# Patient Record
Sex: FEMALE | Race: WHITE | Employment: OTHER | URBAN - METROPOLITAN AREA
[De-identification: names, ages, dates, MRNs, and addresses within clinical notes are randomized per-mention and may not be internally consistent; named-entity substitution may affect disease eponyms.]

---

## 2017-05-31 ENCOUNTER — OFFICE VISIT (OUTPATIENT)
Dept: ORTHOPEDIC SURGERY | Age: 62
End: 2017-05-31

## 2017-05-31 VITALS
HEIGHT: 67 IN | SYSTOLIC BLOOD PRESSURE: 135 MMHG | DIASTOLIC BLOOD PRESSURE: 81 MMHG | WEIGHT: 190.04 LBS | BODY MASS INDEX: 29.83 KG/M2 | HEART RATE: 65 BPM

## 2017-05-31 DIAGNOSIS — M54.31 SCIATICA OF RIGHT SIDE: ICD-10-CM

## 2017-05-31 DIAGNOSIS — M25.551 HIP PAIN, RIGHT: Primary | ICD-10-CM

## 2017-05-31 PROCEDURE — 72100 X-RAY EXAM L-S SPINE 2/3 VWS: CPT | Performed by: ORTHOPAEDIC SURGERY

## 2017-05-31 PROCEDURE — 99214 OFFICE O/P EST MOD 30 MIN: CPT | Performed by: ORTHOPAEDIC SURGERY

## 2017-05-31 RX ORDER — METHYLPREDNISOLONE 4 MG/1
TABLET ORAL
Qty: 1 KIT | Refills: 0 | Status: SHIPPED | OUTPATIENT
Start: 2017-05-31 | End: 2017-05-31 | Stop reason: SDUPTHER

## 2017-05-31 RX ORDER — METHYLPREDNISOLONE 4 MG/1
TABLET ORAL
Qty: 1 KIT | Refills: 0 | Status: SHIPPED | OUTPATIENT
Start: 2017-05-31 | End: 2017-06-06

## 2017-05-31 RX ORDER — IBUPROFEN 200 MG
200 TABLET ORAL EVERY 6 HOURS PRN
COMMUNITY
End: 2019-02-05

## 2017-05-31 RX ORDER — CETIRIZINE HYDROCHLORIDE 10 MG/1
10 TABLET ORAL DAILY
COMMUNITY

## 2017-07-10 DIAGNOSIS — M54.16 LUMBAR RADICULITIS: Primary | ICD-10-CM

## 2017-07-11 ENCOUNTER — TELEPHONE (OUTPATIENT)
Dept: ORTHOPEDIC SURGERY | Age: 62
End: 2017-07-11

## 2017-07-25 ENCOUNTER — OFFICE VISIT (OUTPATIENT)
Dept: ORTHOPEDIC SURGERY | Age: 62
End: 2017-07-25

## 2017-07-25 VITALS
BODY MASS INDEX: 29.83 KG/M2 | HEART RATE: 64 BPM | DIASTOLIC BLOOD PRESSURE: 87 MMHG | HEIGHT: 67 IN | SYSTOLIC BLOOD PRESSURE: 139 MMHG | WEIGHT: 190.04 LBS

## 2017-07-25 DIAGNOSIS — M54.16 LUMBAR RADICULITIS: ICD-10-CM

## 2017-07-25 DIAGNOSIS — M51.36 DDD (DEGENERATIVE DISC DISEASE), LUMBAR: Primary | ICD-10-CM

## 2017-07-25 PROCEDURE — 99214 OFFICE O/P EST MOD 30 MIN: CPT | Performed by: PHYSICIAN ASSISTANT

## 2017-07-28 ENCOUNTER — TELEPHONE (OUTPATIENT)
Dept: ORTHOPEDIC SURGERY | Age: 62
End: 2017-07-28

## 2017-08-01 ENCOUNTER — HOSPITAL ENCOUNTER (OUTPATIENT)
Dept: PAIN MANAGEMENT | Age: 62
Discharge: HOME | End: 2017-08-01
Attending: PHYSICAL MEDICINE & REHABILITATION | Admitting: PHYSICAL MEDICINE & REHABILITATION

## 2017-08-01 VITALS
HEIGHT: 66 IN | DIASTOLIC BLOOD PRESSURE: 78 MMHG | TEMPERATURE: 98.5 F | WEIGHT: 190 LBS | BODY MASS INDEX: 30.53 KG/M2 | RESPIRATION RATE: 16 BRPM | HEART RATE: 68 BPM | SYSTOLIC BLOOD PRESSURE: 125 MMHG | OXYGEN SATURATION: 100 %

## 2017-08-01 RX ORDER — LIDOCAINE HYDROCHLORIDE 10 MG/ML
INJECTION, SOLUTION EPIDURAL; INFILTRATION; INTRACAUDAL; PERINEURAL
Status: DISCONTINUED
Start: 2017-08-01 | End: 2017-08-02 | Stop reason: HOSPADM

## 2017-08-01 RX ORDER — SODIUM CHLORIDE, SODIUM LACTATE, POTASSIUM CHLORIDE, CALCIUM CHLORIDE 600; 310; 30; 20 MG/100ML; MG/100ML; MG/100ML; MG/100ML
INJECTION, SOLUTION INTRAVENOUS CONTINUOUS
Status: DISCONTINUED | OUTPATIENT
Start: 2017-08-01 | End: 2017-08-02 | Stop reason: HOSPADM

## 2017-08-01 RX ORDER — SODIUM CHLORIDE, SODIUM LACTATE, POTASSIUM CHLORIDE, CALCIUM CHLORIDE 600; 310; 30; 20 MG/100ML; MG/100ML; MG/100ML; MG/100ML
INJECTION, SOLUTION INTRAVENOUS
Status: DISCONTINUED
Start: 2017-08-01 | End: 2017-08-02 | Stop reason: HOSPADM

## 2017-08-01 RX ORDER — LIDOCAINE HYDROCHLORIDE 10 MG/ML
0.1 INJECTION, SOLUTION INFILTRATION; PERINEURAL ONCE
Status: COMPLETED | OUTPATIENT
Start: 2017-08-01 | End: 2017-08-01

## 2017-08-01 RX ADMIN — LIDOCAINE HYDROCHLORIDE 0.1 ML: 10 INJECTION, SOLUTION INFILTRATION; PERINEURAL at 13:14

## 2017-08-01 RX ADMIN — SODIUM CHLORIDE, SODIUM LACTATE, POTASSIUM CHLORIDE, CALCIUM CHLORIDE: 600; 310; 30; 20 INJECTION, SOLUTION INTRAVENOUS at 13:14

## 2017-08-01 ASSESSMENT — PAIN DESCRIPTION - DESCRIPTORS: DESCRIPTORS: ACHING

## 2017-08-01 ASSESSMENT — PAIN - FUNCTIONAL ASSESSMENT
PAIN_FUNCTIONAL_ASSESSMENT: 0-10
PAIN_FUNCTIONAL_ASSESSMENT: 0-10

## 2017-08-16 ENCOUNTER — OFFICE VISIT (OUTPATIENT)
Dept: ORTHOPEDIC SURGERY | Age: 62
End: 2017-08-16

## 2017-08-16 VITALS
BODY MASS INDEX: 30.54 KG/M2 | HEART RATE: 65 BPM | HEIGHT: 66 IN | DIASTOLIC BLOOD PRESSURE: 76 MMHG | WEIGHT: 190.04 LBS | SYSTOLIC BLOOD PRESSURE: 129 MMHG

## 2017-08-16 DIAGNOSIS — M51.36 DDD (DEGENERATIVE DISC DISEASE), LUMBAR: Primary | ICD-10-CM

## 2017-08-16 DIAGNOSIS — M54.16 LUMBAR RADICULITIS: ICD-10-CM

## 2017-08-16 PROCEDURE — 99213 OFFICE O/P EST LOW 20 MIN: CPT | Performed by: PHYSICIAN ASSISTANT

## 2017-08-24 ENCOUNTER — HOSPITAL ENCOUNTER (OUTPATIENT)
Dept: PHYSICAL THERAPY | Age: 62
Discharge: HOME | End: 2017-08-31
Admitting: PHYSICIAN ASSISTANT

## 2017-08-31 ENCOUNTER — HOSPITAL ENCOUNTER (OUTPATIENT)
Dept: PHYSICAL THERAPY | Age: 62
Discharge: HOME | End: 2017-08-31
Admitting: PHYSICIAN ASSISTANT

## 2017-09-05 ENCOUNTER — HOSPITAL ENCOUNTER (OUTPATIENT)
Dept: PHYSICAL THERAPY | Age: 62
Discharge: HOME | End: 2017-09-05
Admitting: PHYSICIAN ASSISTANT

## 2017-09-07 ENCOUNTER — HOSPITAL ENCOUNTER (OUTPATIENT)
Dept: PHYSICAL THERAPY | Age: 62
Discharge: HOME | End: 2017-09-07
Admitting: PHYSICIAN ASSISTANT

## 2017-09-12 ENCOUNTER — HOSPITAL ENCOUNTER (OUTPATIENT)
Dept: PHYSICAL THERAPY | Age: 62
Discharge: HOME | End: 2017-09-12
Admitting: PHYSICIAN ASSISTANT

## 2017-09-14 ENCOUNTER — HOSPITAL ENCOUNTER (OUTPATIENT)
Dept: PHYSICAL THERAPY | Age: 62
Discharge: HOME | End: 2017-09-14
Admitting: PHYSICIAN ASSISTANT

## 2017-09-19 ENCOUNTER — HOSPITAL ENCOUNTER (OUTPATIENT)
Dept: PHYSICAL THERAPY | Age: 62
Discharge: HOME | End: 2017-09-19
Admitting: PHYSICIAN ASSISTANT

## 2017-09-21 ENCOUNTER — HOSPITAL ENCOUNTER (OUTPATIENT)
Dept: PHYSICAL THERAPY | Age: 62
Discharge: HOME | End: 2017-09-21
Admitting: PHYSICIAN ASSISTANT

## 2017-09-22 ENCOUNTER — TELEPHONE (OUTPATIENT)
Dept: ORTHOPEDIC SURGERY | Age: 62
End: 2017-09-22

## 2017-09-26 ENCOUNTER — HOSPITAL ENCOUNTER (OUTPATIENT)
Dept: PHYSICAL THERAPY | Age: 62
Discharge: HOME | End: 2017-09-26
Admitting: PHYSICIAN ASSISTANT

## 2017-09-28 ENCOUNTER — HOSPITAL ENCOUNTER (OUTPATIENT)
Dept: PHYSICAL THERAPY | Age: 62
Discharge: HOME | End: 2017-09-28
Admitting: PHYSICIAN ASSISTANT

## 2017-09-29 ENCOUNTER — HOSPITAL ENCOUNTER (OUTPATIENT)
Dept: PAIN MANAGEMENT | Age: 62
Discharge: HOME | End: 2017-09-29
Attending: PHYSICAL MEDICINE & REHABILITATION | Admitting: PHYSICAL MEDICINE & REHABILITATION

## 2017-09-29 VITALS
DIASTOLIC BLOOD PRESSURE: 78 MMHG | HEART RATE: 70 BPM | RESPIRATION RATE: 16 BRPM | SYSTOLIC BLOOD PRESSURE: 136 MMHG | BODY MASS INDEX: 29.82 KG/M2 | HEIGHT: 67 IN | WEIGHT: 190 LBS | TEMPERATURE: 96.9 F | OXYGEN SATURATION: 100 %

## 2017-09-29 ASSESSMENT — PAIN DESCRIPTION - DESCRIPTORS: DESCRIPTORS: ACHING

## 2017-09-29 ASSESSMENT — PAIN - FUNCTIONAL ASSESSMENT
PAIN_FUNCTIONAL_ASSESSMENT: 0-10
PAIN_FUNCTIONAL_ASSESSMENT: 0-10

## 2017-10-03 ENCOUNTER — HOSPITAL ENCOUNTER (OUTPATIENT)
Dept: PHYSICAL THERAPY | Age: 62
Discharge: HOME | End: 2017-10-03
Admitting: PHYSICIAN ASSISTANT

## 2017-10-03 NOTE — FLOWSHEET NOTE
Therapeutic Ex Sets/reps Notes   R SL with Rot 5'    R SL with Rot and OP 5'    Prone and prone with hips L Decreased ankle pain to 1/10    Press ups with hips L 3x10    FR R 5' Hips to 90-abolishes LE pain cont.'d numbness                                                                                                    Manual Intervention     OP with SL rot 5'    Shift hips L with press ups 3x10    Lumbar/sacral PA's 6'    Piriformis release 5'              NMR re-education     Reviewed sit to supine to sit to avoid flexion during transfer                                                 Therapeutic Exercise and NMR EXR  [x] (26203) Provided verbal/tactile cueing for activities related to strengthening, flexibility, endurance, ROM for improvements in LE, proximal hip, and core control with self care, mobility, lifting, ambulation.  [] (13513) Provided verbal/tactile cueing for activities related to improving balance, coordination, kinesthetic sense, posture, motor skill, proprioception  to assist with LE, proximal hip, and core control in self care, mobility, lifting, ambulation and eccentric single leg control.      NMR and Therapeutic Activities:    [x] (57539 or 59352) Provided verbal/tactile cueing for activities related to improving balance, coordination, kinesthetic sense, posture, motor skill, proprioception and motor activation to allow for proper function of core, proximal hip and LE with self care and ADLs  [] (60245) Gait Re-education- Provided training and instruction to the patient for proper LE, core and proximal hip recruitment and positioning and eccentric body weight control with ambulation re-education including up and down stairs     Home Exercise Program:    [x] (13492) Reviewed/Progressed HEP activities related to strengthening, flexibility, endurance, ROM of core, proximal hip and LE for functional self-care, mobility, lifting and ambulation/stair navigation   [] (27412)Reviewed/Progressed

## 2017-10-05 ENCOUNTER — HOSPITAL ENCOUNTER (OUTPATIENT)
Dept: PHYSICAL THERAPY | Age: 62
Discharge: HOME | End: 2017-10-05
Admitting: PHYSICIAN ASSISTANT

## 2017-10-05 NOTE — FLOWSHEET NOTE
[facility], [address]  Phone: [phone]  Fax [phone]    Physical Therapy Daily Treatment Note  Date:  10/5/2017    Patient Name:  Saloni Rodrigues    :  1955  MRN: [id number]  Restrictions/Precautions:    Physician Information:  Referring Practitioner: Dr. Duane Olds  Medical/Treatment Diagnosis Information:  · Diagnosis: lumbar DDD, lumbar radiculitis  · Treatment Diagnosis: Lumbar radiculopathy  ICD-10-CM Diagnosis Code M54.16, Low back pain  ICD-10-CM Diagnosis Code M54.5  [x] Conservative / [] Surgical - DOS:  Therapy Diagnosis/Practice Pattern:  Practice Pattern F: Spinal Disorders  Insurance/Certification information:  PT Insurance Information: 17 ANTHEM - $0CP - $400DED - 20PT/OT - 85/15% - AFTER 20V, [address] [facility] [phone] - MUST CTC PATIENT FOR ACCUMULATIONS  Plan of care signed: [] YES  [] NO  Number of Comorbidities:  []0     [x]1-2    []3+  Date of Patient follow up with Physician:     G-Code (if applicable):      Date G-Code Applied:  17       Progress Note: [x]  Yes  []  No  Next due by: Visit #10        Latex Allergy:  [x]NO      []YES  Preferred Language for Healthcare:   [x]English       []other:    Visit # Insurance Allowable Reporting Period    Begin Date: 10/5/2017               End Date:      RECERT DUE BY: 39/10/24    Pain level:  6/10     SUBJECTIVE:  Pt. Reports that when the legs start hurting, it seems like sitting helps the most.  Has the most trouble with trying to get comfortable at night.     OBJECTIVE:   Observation:stiffness in lumbar spine with PA's  Palpation:R lumbar and thoracic paraspinals, R piriformis>L     Test used Initial score Current Score   Pain Summary VAS 6/10 5-6/10   Functional questionnaire Modified oswestry 50% 66%   ROM       Lumbar Ext <10% 50%   Strength LE R/L 4/5               RESTRICTIONS/PRECAUTIONS: LE for functional self-care, mobility, lifting and ambulation/stair navigation   [] (63753)Reviewed/Progressed HEP activities related to improving balance, coordination, kinesthetic sense, posture, motor skill, proprioception of core, proximal hip and LE for self care, mobility, lifting, and ambulation/stair navigation      Manual Treatments:  PROM / STM / Oscillations-Mobs:  G-I, II, III, IV (PA's, Inf., Post.)  [x] (79163) Provided manual therapy to mobilize LE, proximal hip and/or LS spine soft tissue/joints for the purpose of modulating pain, promoting relaxation,  increasing ROM, reducing/eliminating soft tissue swelling/inflammation/restriction, improving soft tissue extensibility and allowing for proper ROM for normal function with self care, mobility, lifting and ambulation. Modalities:  PM/HP x15'HLCharges:  Timed Code Treatment Minutes: 38   Total Treatment Minutes: 53     [] EVAL (LOW) 50939 (typically 20 minutes face-to-face)  [] EVAL (MOD) 51415 (typically 30 minutes face-to-face)  [] EVAL (HIGH) 17739 (typically 45 minutes face-to-face)  [] RE-EVAL     [x] Tajik(38788) x  1   [] IONTO  [x] NMR (45428) x  1   [] VASO  [x] Manual (15041) x  1    [] Other:  [] TA x       [] Mech Traction (56667)  [] ES(attended) (73577)      [x] ES (un) (15675):     GOALS:   Patient stated goal: I would like to be able to walk again    Therapist goals for Patient:   Short Term Goals: To be achieved in: 2 weeks  1. Independent in HEP and progression per patient tolerance, in order to prevent re-injury. 2. Patient will have a decrease in pain to facilitate improvement in movement, function, and ADLs as indicated by Functional Deficits. Long Term Goals: To be achieved in: 8 weeks  1. Disability index score of 25% or less for the [place] to assist with reaching prior level of function.    2. Patient will demonstrate increased lumbar AROM to WNL, good+ LS mobility, good hip ROM to allow for proper joint functioning as

## 2017-10-09 ENCOUNTER — HOSPITAL ENCOUNTER (OUTPATIENT)
Dept: PHYSICAL THERAPY | Age: 62
Discharge: HOME | End: 2017-10-09
Admitting: PHYSICIAN ASSISTANT

## 2017-10-09 NOTE — FLOWSHEET NOTE
5' Lessens pain at R ankle   R SL with Rot and OP 5' Lessens pain at R ankle             FR R 5' Lessens pain at R ankle        SKTC R 4x10 More central during and after   DKTC 4x10 More central and less pain during and after   PPT :5x20    Supine HS stretch :30x3    R piriformis stretch :30x3                                                                       Manual Intervention     OP with SL rot 5'    Shift hips L with press ups 3x10       Piriformis release 5'              NMR re-education     Reviewed sit to supine to sit to avoid flexion during transfer          Reviewed extensively how to track distal symptoms and centralization 8'                                      Therapeutic Exercise and NMR EXR  [x] (53455) Provided verbal/tactile cueing for activities related to strengthening, flexibility, endurance, ROM for improvements in LE, proximal hip, and core control with self care, mobility, lifting, ambulation.  [] (25489) Provided verbal/tactile cueing for activities related to improving balance, coordination, kinesthetic sense, posture, motor skill, proprioception  to assist with LE, proximal hip, and core control in self care, mobility, lifting, ambulation and eccentric single leg control.      NMR and Therapeutic Activities:    [x] (54860 or 98946) Provided verbal/tactile cueing for activities related to improving balance, coordination, kinesthetic sense, posture, motor skill, proprioception and motor activation to allow for proper function of core, proximal hip and LE with self care and ADLs  [] (18664) Gait Re-education- Provided training and instruction to the patient for proper LE, core and proximal hip recruitment and positioning and eccentric body weight control with ambulation re-education including up and down stairs     Home Exercise Program:    [x] (08828) Reviewed/Progressed HEP activities related to strengthening, flexibility, endurance, ROM of core, proximal hip and LE for functional

## 2017-10-12 ENCOUNTER — HOSPITAL ENCOUNTER (OUTPATIENT)
Dept: PHYSICAL THERAPY | Age: 62
Discharge: HOME | End: 2017-10-12
Admitting: PHYSICIAN ASSISTANT

## 2017-10-12 NOTE — FLOWSHEET NOTE
[facility], [address]  Phone: [phone]  Fax [phone]    Physical Therapy Daily Treatment Note  Date:  10/12/2017    Patient Name:  Zack Lazaro    :  1955  MRN: [id number]  Restrictions/Precautions:    Physician Information:  Referring Practitioner: Dr. Lyle Manuel  Medical/Treatment Diagnosis Information:  · Diagnosis: lumbar DDD, lumbar radiculitis  · Treatment Diagnosis: Lumbar radiculopathy  ICD-10-CM Diagnosis Code M54.16, Low back pain  ICD-10-CM Diagnosis Code M54.5  [x] Conservative / [] Surgical - DOS:  Therapy Diagnosis/Practice Pattern:  Practice Pattern F: Spinal Disorders  Insurance/Certification information:  PT Insurance Information: 17 ANTHEM - $0CP - $400DED - 20PT/OT - 85/15% - AFTER 20V, [address] [facility] [phone] - MUST CTC PATIENT FOR ACCUMULATIONS  Plan of care signed: [] YES  [] NO  Number of Comorbidities:  []0     [x]1-2    []3+  Date of Patient follow up with Physician:     G-Code (if applicable):      Date G-Code Applied:  17       Progress Note: [x]  Yes  []  No  Next due by: Visit #10        Latex Allergy:  [x]NO      []YES  Preferred Language for Healthcare:   [x]English       []other:    Visit # Insurance Allowable Reporting Period   15 20 Begin Date: 10/12/2017               End Date:      RECERT DUE BY:     Pain level:  2/10  back     SUBJECTIVE:  Pt. Reports that she was in the recline and started to choke in her sleep, she jumped out of the chair and hit the R K' on the floor. She is very upset and thinks this is a real setback. She is walking w/o a limp, no bruising little/no swelling in the R K'. 3/10 pain, just achey. No c/o radiating pain in either leg. She seemed consumed by \" all her problems \", \" falling apart \". Needed reassurance today. OBJECTIVE:   Darrius Langston in lumbar spine with Edna ZHOU' Gait Re-education- Provided training and instruction to the patient for proper LE, core and proximal hip recruitment and positioning and eccentric body weight control with ambulation re-education including up and down stairs     Home Exercise Program:    [x] (88111) Reviewed/Progressed HEP activities related to strengthening, flexibility, endurance, ROM of core, proximal hip and LE for functional self-care, mobility, lifting and ambulation/stair navigation   [] (22732)Reviewed/Progressed HEP activities related to improving balance, coordination, kinesthetic sense, posture, motor skill, proprioception of core, proximal hip and LE for self care, mobility, lifting, and ambulation/stair navigation      Manual Treatments:  PROM / STM / Oscillations-Mobs:  G-I, II, III, IV (PA's, Inf., Post.)  [x] (55657) Provided manual therapy to mobilize LE, proximal hip and/or LS spine soft tissue/joints for the purpose of modulating pain, promoting relaxation,  increasing ROM, reducing/eliminating soft tissue swelling/inflammation/restriction, improving soft tissue extensibility and allowing for proper ROM for normal function with self care, mobility, lifting and ambulation. Modalities:  PM/HP x15'HLCharges:  Timed Code Treatment Minutes: 45   Total Treatment Minutes: 60     [] EVAL (LOW) 06242 (typically 20 minutes face-to-face)  [] EVAL (MOD) 65339 (typically 30 minutes face-to-face)  [] EVAL (HIGH) 64884 (typically 45 minutes face-to-face)  [] RE-EVAL     [x] OC(56919) x  1   [] IONTO  [x] NMR (86451) x  1   [] VASO  [x] Manual (46384) x  1    [] Other:  [] TA x       [] Mech Traction (36800)  [] ES(attended) (86470)      [x] ES (un) (38689):     GOALS:   Patient stated goal: I would like to be able to walk again    Therapist goals for Patient:   Short Term Goals: To be achieved in: 2 weeks  1. Independent in HEP and progression per patient tolerance, in order to prevent re-injury.    2. Patient will have a decrease in pain to facilitate improvement in movement, function, and ADLs as indicated by Functional Deficits. Long Term Goals: To be achieved in: 8 weeks  1. Disability index score of 25% or less for the [place] to assist with reaching prior level of function. 2. Patient will demonstrate increased lumbar AROM to WNL, good+ LS mobility, good hip ROM to allow for proper joint functioning as indicated by patients Functional Deficits. 3. Patient will demonstrate an increase in Strength to good+ proximal hip and core activation to allow for proper functional mobility as indicated by patients Functional Deficits. 4. Patient will return to all adl functional activities without increased symptoms or restriction. 5. Pt. To be able to walk without pain(patient specific functional goal)         New or Updated Goals (if applicable):  [x] No change to goals established upon initial eval/last progress note:  New Goals:    Progression Towards Functional goals:   [] Patient is progressing as expected towards functional goals listed. [x] Progression is slowed due to complexities listed. [] Progression has been slowed due to co-morbidities. [] Plan just implemented, too soon to assess goals progression  [] Other:     ASSESSMENT:    [x] Improvement noted relative to goals:pain is less intense and less frequent into the R LE  [] No Improvement noted related to goals:  Summary/Patient's response to treatment: trial of repeated supine flexion this date seems to be more effective at centralizing and decreasing pain into R LE and LB. Reviewed tracking and centralization with pt. She will try changing to repeated flexion biased ex. Today and monitor symptom response.     Treatment/Activity Tolerance:  [x] Patient tolerated treatment well [] Patient limited by fatique  [] Patient limited by pain  [] Patient limited by other medical complications  [] Other:     Prognosis: [] Good [x] Fair  [] Poor    Patient Requires Follow-up: [x] Yes  [] No    PLAN:   [x] Continue per plan of care [] Alter current plan (see comments)  [] Plan of care initiated [] Hold pending MD visit [] Discharge    Electronically signed by: George Smith PT

## 2017-10-13 ENCOUNTER — OFFICE VISIT (OUTPATIENT)
Dept: ORTHOPEDIC SURGERY | Age: 62
End: 2017-10-13

## 2017-10-13 VITALS
BODY MASS INDEX: 29.83 KG/M2 | SYSTOLIC BLOOD PRESSURE: 128 MMHG | WEIGHT: 190.04 LBS | DIASTOLIC BLOOD PRESSURE: 89 MMHG | HEART RATE: 69 BPM | HEIGHT: 67 IN

## 2017-10-13 DIAGNOSIS — M51.36 DDD (DEGENERATIVE DISC DISEASE), LUMBAR: Primary | ICD-10-CM

## 2017-10-13 DIAGNOSIS — M54.16 LUMBAR RADICULITIS: ICD-10-CM

## 2017-10-13 PROCEDURE — 99213 OFFICE O/P EST LOW 20 MIN: CPT | Performed by: PHYSICAL MEDICINE & REHABILITATION

## 2017-10-13 NOTE — PROGRESS NOTES
Coronal balance is neutral.      · Palpation:   No evidence of tenderness at the midline. No tenderness bilaterally at the paraspinal or trochanters. There is no step-off or paraspinal spasm. · Range of Motion:    Minimal loss flexion extension  · Strength:   Strength testing is 5/5 in all muscle groups tested. · Special Tests:   Straight leg raise and crossed SLR negative. Leg length and pelvis level.  0 out of 5 Bharath's signs. · Skin: There are no rashes, ulcerations or lesions. · Reflexes: Reflexes are symmetrically 2+ at the patellar and ankle tendons. Clonus absent bilaterally at the feet. · Gait & station: Normal gait   · Additional Examinations:   · RIGHT LOWER EXTREMITY: Inspection/examination of the right lower extremity does not show any tenderness, deformity or injury. Range of motion is full. There is no gross instability. There are no rashes, ulcerations or lesions. Strength and tone are normal.  · LEFT LOWER EXTREMITY:  Inspection/examination of the left lower extremity does not show any tenderness, deformity or injury. Range of motion is full. There is no gross instability. There are no rashes, ulcerations or lesions. Strength and tone are normal.    Diagnostic Testing:     Lumbar MRI scan and report reviewed 7/17/2017 showing L5-S1 DDD with Modic changes and disc bulging w/bilateral foraminal stenosis, facet arthropathy/synovitis.  No severe central or foraminal narrowing    Impression:    7 mo lbp, R>L lumbar radiculitis improved  L5-S1 DDD, modic changes w/B FS  3) Hip eval, Dr. Norman Boots:    She has good bad days and 50-50 back pain and right lateral calf pain. Both suggest monitoring and continuing therapy and activities to her tolerance.   She'll call in 3-4 weeks the symptoms persist or worsen consider a 3rd epidural right L5-S1 interlaminar      F Frankie Freeman

## 2017-10-17 ENCOUNTER — HOSPITAL ENCOUNTER (OUTPATIENT)
Dept: PHYSICAL THERAPY | Age: 62
Discharge: HOME | End: 2017-10-17
Admitting: PHYSICIAN ASSISTANT

## 2017-10-17 NOTE — FLOWSHEET NOTE
Functional Deficits. 3. Patient will demonstrate an increase in Strength to good+ proximal hip and core activation to allow for proper functional mobility as indicated by patients Functional Deficits. 4. Patient will return to all adl functional activities without increased symptoms or restriction. 5. Pt. To be able to walk without pain(patient specific functional goal)         New or Updated Goals (if applicable):  [x] No change to goals established upon initial eval/last progress note:  New Goals:    Progression Towards Functional goals:   [] Patient is progressing as expected towards functional goals listed. [x] Progression is slowed due to complexities listed. [] Progression has been slowed due to co-morbidities. [] Plan just implemented, too soon to assess goals progression  [] Other:     ASSESSMENT:    [x] Improvement noted relative to goals:pain is less intense and less frequent into the R LE, seems to be more central  [] No Improvement noted related to goals:  Summary/Patient's response to treatment: able to abolish pain with flexion biased approach, instructed pt. To perform exercises every 2 hours.   Treatment/Activity Tolerance:  [x] Patient tolerated treatment well [] Patient limited by fatique  [] Patient limited by pain  [] Patient limited by other medical complications  [] Other:     Prognosis: [] Good [x] Fair  [] Poor    Patient Requires Follow-up: [x] Yes  [] No    PLAN:   [x] Continue per plan of care [] Alter current plan (see comments)  [] Plan of care initiated [] Hold pending MD visit [] Discharge    Electronically signed by: John Rojas, PT [id number]

## 2017-10-20 ENCOUNTER — HOSPITAL ENCOUNTER (OUTPATIENT)
Dept: PHYSICAL THERAPY | Age: 62
Discharge: HOME | End: 2017-10-20
Admitting: PHYSICIAN ASSISTANT

## 2017-10-24 ENCOUNTER — HOSPITAL ENCOUNTER (OUTPATIENT)
Dept: PHYSICAL THERAPY | Age: 62
Discharge: HOME | End: 2017-10-24
Admitting: PHYSICIAN ASSISTANT

## 2017-10-24 NOTE — FLOWSHEET NOTE
[facility], [address]  Phone: [phone]  Fax [phone]    Physical Therapy Daily Treatment Note  Date:  10/24/2017    Patient Name:  Carolyn Farrell    :  1955  MRN: [id number]  Restrictions/Precautions:    Physician Information:  Referring Practitioner: Dr. Kassie Nath  Medical/Treatment Diagnosis Information:  · Diagnosis: lumbar DDD, lumbar radiculitis  · Treatment Diagnosis: Lumbar radiculopathy  ICD-10-CM Diagnosis Code M54.16, Low back pain  ICD-10-CM Diagnosis Code M54.5  [x] Conservative / [] Surgical - DOS:  Therapy Diagnosis/Practice Pattern:  Practice Pattern F: Spinal Disorders  Insurance/Certification information:  PT Insurance Information: 17 ANTHEM - $0CP - $400DED - 20PT/OT - 85/15% - AFTER 20V, [address] [facility] [phone] - MUST CTC PATIENT FOR ACCUMULATIONS  Plan of care signed: [] YES  [] NO  Number of Comorbidities:  []0     [x]1-2    []3+  Date of Patient follow up with Physician:     G-Code (if applicable):      Date G-Code Applied:  17       Progress Note: [x]  Yes  []  No  Next due by: Visit #10        Latex Allergy:  [x]NO      []YES  Preferred Language for Healthcare:   [x]English       []other:    Visit # Insurance Allowable Reporting Period    Begin Date: 10/24/2017               End Date:      RECERT DUE BY: 10/36/12    Pain level:  2/10  back     SUBJECTIVE:  The last 2 days have not been good, pain in leg is so bad and the buttock. OBJECTIVE:   Steele Gretchen in lumbar spine with PA's. R K' soreness.   Palpation:R lumbar and thoracic paraspinals, R piriformis>L     Test used Initial score Current Score   Pain Summary VAS 6/10 3/10   Functional questionnaire Modified oswestry 50% 66%   ROM       Lumbar Ext <10% 50%   Strength LE R/L 4/5               RESTRICTIONS/PRECAUTIONS:     Exercises/Interventions:     Therapeutic Ex Sets/reps Notes Supine unload in HL 5'    PPT 3x10 H5 After DKTC decreases pain   Trial of R SL with rot and prone/HANK No significant change              SKTC R     DKTC 8x10 To centralize to B LB   Second round of DKTC after manuals 2x10    Supine HS stretch :30x3    R piriformis stretch :30x3    QL stretch:60x1 B manual              seated PPT x3' Cues needed   Standing PPT x2' to learn    Bike                                                 Manual Intervention               Lumbar flexion mobs in SL L4-S1 6'    STW RQL/RSIJ 8'              NMR re-education              Reviewed extensively how to track distal symptoms and centralization                                       Therapeutic Exercise and NMR EXR  [x] (67364) Provided verbal/tactile cueing for activities related to strengthening, flexibility, endurance, ROM for improvements in LE, proximal hip, and core control with self care, mobility, lifting, ambulation.  [] (29584) Provided verbal/tactile cueing for activities related to improving balance, coordination, kinesthetic sense, posture, motor skill, proprioception  to assist with LE, proximal hip, and core control in self care, mobility, lifting, ambulation and eccentric single leg control.      NMR and Therapeutic Activities:    [x] (94567 or 13532) Provided verbal/tactile cueing for activities related to improving balance, coordination, kinesthetic sense, posture, motor skill, proprioception and motor activation to allow for proper function of core, proximal hip and LE with self care and ADLs  [] (16435) Gait Re-education- Provided training and instruction to the patient for proper LE, core and proximal hip recruitment and positioning and eccentric body weight control with ambulation re-education including up and down stairs     Home Exercise Program:    [x] (74496) Reviewed/Progressed HEP activities related to strengthening, flexibility, endurance, ROM of core, proximal hip and LE for functional self-care, mobility, lifting and ambulation/stair navigation   [] (39777)Reviewed/Progressed HEP activities related to improving balance, coordination, kinesthetic sense, posture, motor skill, proprioception of core, proximal hip and LE for self care, mobility, lifting, and ambulation/stair navigation      Manual Treatments:  PROM / STM / Oscillations-Mobs:  G-I, II, III, IV (PA's, Inf., Post.)  [x] (72371) Provided manual therapy to mobilize LE, proximal hip and/or LS spine soft tissue/joints for the purpose of modulating pain, promoting relaxation,  increasing ROM, reducing/eliminating soft tissue swelling/inflammation/restriction, improving soft tissue extensibility and allowing for proper ROM for normal function with self care, mobility, lifting and ambulation. Modalities:  PM/HP x15'HL,  pt. Stayed lying for additional 10' afterwardCharges:  Timed Code Treatment Minutes: 40   Total Treatment Minutes: 55     [] EVAL (LOW) 29894 (typically 20 minutes face-to-face)  [] EVAL (MOD) 60301 (typically 30 minutes face-to-face)  [] EVAL (HIGH) 50108 (typically 45 minutes face-to-face)  [] RE-EVAL     [x] SD(42056) x  1   [] IONTO  [x] NMR (21214) x  1   [] VASO  [x] Manual (23275) x  1    [] Other:  [] TA x       [] Mech Traction (72734)  [] ES(attended) (98629)      [x] ES (un) (09013):     GOALS:   Patient stated goal: I would like to be able to walk again    Therapist goals for Patient:   Short Term Goals: To be achieved in: 2 weeks  1. Independent in HEP and progression per patient tolerance, in order to prevent re-injury. 2. Patient will have a decrease in pain to facilitate improvement in movement, function, and ADLs as indicated by Functional Deficits. Long Term Goals: To be achieved in: 8 weeks  1. Disability index score of 25% or less for the [place] to assist with reaching prior level of function.    2. Patient will demonstrate increased lumbar AROM to WNL, good+ LS mobility, good hip ROM to allow for proper joint

## 2017-11-01 ENCOUNTER — HOSPITAL ENCOUNTER (OUTPATIENT)
Dept: PHYSICAL THERAPY | Age: 62
Discharge: HOME | End: 2017-11-30
Attending: PHYSICIAN ASSISTANT | Admitting: PHYSICIAN ASSISTANT

## 2017-11-17 ENCOUNTER — OFFICE VISIT (OUTPATIENT)
Dept: ORTHOPEDIC SURGERY | Age: 62
End: 2017-11-17

## 2017-11-17 VITALS
SYSTOLIC BLOOD PRESSURE: 117 MMHG | DIASTOLIC BLOOD PRESSURE: 75 MMHG | HEART RATE: 77 BPM | WEIGHT: 190.04 LBS | HEIGHT: 67 IN | BODY MASS INDEX: 29.83 KG/M2

## 2017-11-17 DIAGNOSIS — M79.672 BILATERAL FOOT PAIN: Primary | ICD-10-CM

## 2017-11-17 DIAGNOSIS — M79.671 BILATERAL FOOT PAIN: Primary | ICD-10-CM

## 2017-11-17 PROCEDURE — 99214 OFFICE O/P EST MOD 30 MIN: CPT | Performed by: ORTHOPAEDIC SURGERY

## 2017-11-17 NOTE — PROGRESS NOTES
Chief Complaint    Pain (Bilateral foot pain needs orthotic.)      History of Present Illness:  Carolyn Farrell is a 65 y.o. female who is here for evaluation of her bilateral midfoot pain. She states that she has back knee and midfoot pain. It's constant walking too much or standing too much increase her pain getting off of her feet make it feel better. She is tried meloxicam intermittently which does help somewhat. She's used inserts in the past and would like a new prescription for custom inserts. Currently rates her pain at 4 out of 10. He also complains of pain along the posterior lateral aspect of the right ankle runs up the back or leg     Medical History:  Patient's medications, allergies, past medical, surgical, social and family histories were reviewed and updated as appropriate. Review of Systems:  Pertinent items are noted in HPI  Review of systems reviewed from Patient History Form dated on 11/17/17 and available in the patient's chart under the Media tab. Vital Signs:  /75 (Site: Left Arm, Position: Sitting)   Pulse 77   Ht 5' 6.93\" (1.7 m)   Wt 190 lb 0.6 oz (86.2 kg)   BMI 29.83 kg/m²     General Exam:   Constitutional: Patient is adequately groomed with no evidence of malnutrition  DTRs: Deep tendon reflexes are intact  Mental Status: The patient is oriented to time, place and person. The patient's mood and affect are appropriate. Lymphatic: The lymphatic examination bilaterally reveals all areas to be without enlargement or induration. Foot Examination:    Inspection:  Minimal swelling bilateral midfoot    Palpation:  Tenderness to the midfoot with mobilization. No pain with palpation along the peroneal tendons    Range of Motion:  Tight gastrocs and hamstrings    Strength:  Generally 4/5 no focal weakness    Special Tests:  Anterior drawer and talar tilt showed no gross laxity    Skin: There are no rashes, ulcerations or lesions.     Gait: Antalgic    Reflex 2+ and

## 2017-11-21 ENCOUNTER — TELEPHONE (OUTPATIENT)
Dept: INTERNAL MEDICINE CLINIC | Age: 62
End: 2017-11-21

## 2017-11-21 NOTE — TELEPHONE ENCOUNTER
Patient called requesting an order for Bone density and 3-D mammogram  she will schedule at [facility]. Saroj Swan states she will schedule follow up along the scans. So results will be in . Her number is [phone].

## 2017-11-28 ENCOUNTER — HOSPITAL ENCOUNTER (OUTPATIENT)
Dept: MAMMOGRAPHY | Age: 62
Discharge: HOME | End: 2017-11-28
Attending: INTERNAL MEDICINE | Admitting: INTERNAL MEDICINE

## 2017-11-28 ENCOUNTER — TELEPHONE (OUTPATIENT)
Dept: INTERNAL MEDICINE CLINIC | Age: 62
End: 2017-11-28

## 2017-11-28 DIAGNOSIS — M85.80 OSTEOPENIA, UNSPECIFIED LOCATION: ICD-10-CM

## 2017-11-28 DIAGNOSIS — Z13.820 SCREENING FOR OSTEOPOROSIS: ICD-10-CM

## 2017-11-28 DIAGNOSIS — Z78.0 POSTMENOPAUSE: Primary | ICD-10-CM

## 2017-11-28 DIAGNOSIS — Z12.31 ENCOUNTER FOR SCREENING MAMMOGRAM FOR BREAST CANCER: ICD-10-CM

## 2017-11-28 NOTE — TELEPHONE ENCOUNTER
Pt has been informed. Regarding- Inform pt DEXA ordered and Dr. Nicolas Mario already ordered mammogram per epic.

## 2018-01-04 ENCOUNTER — OFFICE VISIT (OUTPATIENT)
Dept: INTERNAL MEDICINE CLINIC | Age: 63
End: 2018-01-04

## 2018-01-04 VITALS
SYSTOLIC BLOOD PRESSURE: 128 MMHG | HEIGHT: 67 IN | RESPIRATION RATE: 16 BRPM | DIASTOLIC BLOOD PRESSURE: 94 MMHG | BODY MASS INDEX: 30.13 KG/M2 | WEIGHT: 192 LBS | HEART RATE: 78 BPM

## 2018-01-04 DIAGNOSIS — G89.29 CHRONIC BILATERAL LOW BACK PAIN WITH RIGHT-SIDED SCIATICA: ICD-10-CM

## 2018-01-04 DIAGNOSIS — M85.80 OSTEOPENIA, UNSPECIFIED LOCATION: ICD-10-CM

## 2018-01-04 DIAGNOSIS — Z23 NEED FOR DIPHTHERIA-TETANUS-PERTUSSIS (TDAP) VACCINE, ADULT/ADOLESCENT: ICD-10-CM

## 2018-01-04 DIAGNOSIS — Z00.00 ANNUAL PHYSICAL EXAM: Primary | ICD-10-CM

## 2018-01-04 DIAGNOSIS — M54.41 CHRONIC BILATERAL LOW BACK PAIN WITH RIGHT-SIDED SCIATICA: ICD-10-CM

## 2018-01-04 LAB
A/G RATIO: 1.7 (ref 1.1–2.2)
ALBUMIN SERPL-MCNC: 4.7 G/DL (ref 3.4–5)
ALP BLD-CCNC: 91 U/L (ref 40–129)
ALT SERPL-CCNC: 20 U/L (ref 10–40)
ANION GAP SERPL CALCULATED.3IONS-SCNC: 17 MMOL/L (ref 3–16)
AST SERPL-CCNC: 20 U/L (ref 15–37)
BASOPHILS ABSOLUTE: 0 K/UL (ref 0–0.2)
BASOPHILS RELATIVE PERCENT: 0.9 %
BILIRUB SERPL-MCNC: 0.5 MG/DL (ref 0–1)
BUN BLDV-MCNC: 12 MG/DL (ref 7–20)
CALCIUM SERPL-MCNC: 9.6 MG/DL (ref 8.3–10.6)
CHLORIDE BLD-SCNC: 98 MMOL/L (ref 99–110)
CHOLESTEROL, TOTAL: 243 MG/DL (ref 0–199)
CO2: 25 MMOL/L (ref 21–32)
CREAT SERPL-MCNC: 0.8 MG/DL (ref 0.6–1.2)
EOSINOPHILS ABSOLUTE: 0.1 K/UL (ref 0–0.6)
EOSINOPHILS RELATIVE PERCENT: 1.6 %
GFR AFRICAN AMERICAN: >60
GFR NON-AFRICAN AMERICAN: >60
GLOBULIN: 2.7 G/DL
GLUCOSE BLD-MCNC: 68 MG/DL (ref 70–99)
HCT VFR BLD CALC: 40.9 % (ref 36–48)
HDLC SERPL-MCNC: 56 MG/DL (ref 40–60)
HEMOGLOBIN: 13.5 G/DL (ref 12–16)
LDL CHOLESTEROL CALCULATED: 153 MG/DL
LYMPHOCYTES ABSOLUTE: 1.5 K/UL (ref 1–5.1)
LYMPHOCYTES RELATIVE PERCENT: 28.2 %
MCH RBC QN AUTO: 29.1 PG (ref 26–34)
MCHC RBC AUTO-ENTMCNC: 33 G/DL (ref 31–36)
MCV RBC AUTO: 88.1 FL (ref 80–100)
MONOCYTES ABSOLUTE: 0.4 K/UL (ref 0–1.3)
MONOCYTES RELATIVE PERCENT: 8.2 %
NEUTROPHILS ABSOLUTE: 3.3 K/UL (ref 1.7–7.7)
NEUTROPHILS RELATIVE PERCENT: 61.1 %
PDW BLD-RTO: 13.2 % (ref 12.4–15.4)
PLATELET # BLD: 282 K/UL (ref 135–450)
PMV BLD AUTO: 7.6 FL (ref 5–10.5)
POTASSIUM SERPL-SCNC: 5.1 MMOL/L (ref 3.5–5.1)
RBC # BLD: 4.64 M/UL (ref 4–5.2)
SODIUM BLD-SCNC: 140 MMOL/L (ref 136–145)
TOTAL PROTEIN: 7.4 G/DL (ref 6.4–8.2)
TRIGL SERPL-MCNC: 170 MG/DL (ref 0–150)
TSH REFLEX: 2.24 UIU/ML (ref 0.27–4.2)
VLDLC SERPL CALC-MCNC: 34 MG/DL
WBC # BLD: 5.4 K/UL (ref 4–11)

## 2018-01-04 PROCEDURE — 90471 IMMUNIZATION ADMIN: CPT | Performed by: INTERNAL MEDICINE

## 2018-01-04 PROCEDURE — 90715 TDAP VACCINE 7 YRS/> IM: CPT | Performed by: INTERNAL MEDICINE

## 2018-01-04 PROCEDURE — 36415 COLL VENOUS BLD VENIPUNCTURE: CPT | Performed by: INTERNAL MEDICINE

## 2018-01-04 PROCEDURE — 99396 PREV VISIT EST AGE 40-64: CPT | Performed by: INTERNAL MEDICINE

## 2018-01-04 NOTE — PATIENT INSTRUCTIONS
Colonoscopy with [facility]: Dr. Marilynn Odom, [place] or [place]: [phone]        Preventive plan of care for Nikhil Bui        1/4/2018           Preventive Measures Status       Recommendations for screening   Colon Cancer Screen   Colonoscopy Test recommended and referral provided   Breast Cancer Screen  Mammogram Repeat yearly   Cervical Cancer Screen   PAP smear:  Test is due this year   Osteoporosis Screen   DEXA scan  This test is not clinically indicated   Diabetes Screen  Glucose (mg/dL)   Date Value   11/29/2016 102 (H)    Test recommended and ordered   Cholesterol Screen  Lab Results   Component Value Date    CHOL 222 (H) 11/29/2016    TRIG 142 11/29/2016    HDL 54 11/29/2016    LDLCALC 140 (H) 11/29/2016    Test recommended and ordered   Aspirin for Cardiovascular Prevention   No Not indicated   Weight: Body mass index is 30.07 kg/m².   5' 7\" (1.702 m)192 lb (87.1 kg)    Your BMI is 25 or greater, which indicates that you are overweight   Living Will: Yes   Copy on file    Recommended Immunizations    Immunization History   Administered Date(s) Administered    Influenza Virus Vaccine 09/08/2015    Influenza, Sharon Bass, 3 yrs and older, IM, Preservative Free 11/29/2016    Tdap (Boostrix, Adacel) 12/08/2007    Zoster 12/02/2016        Influenza vaccine:  recommended every fall  Tetanus vaccine:  tetanus and diptheria vaccine (Td) administered today- risks and benefits discussed         Other Recommendations ·   See a dentist every 6 months  · Try to get at least 30 minutes of exercise 3-5 days per week  · Always wear a seat belt when traveling in a car  · Always wear a helmet when riding a bicycle or motorcycle  · When exposed to the sun, use a sunscreen that protects against both UVA and UVB radiation with an SPF of 30 or greater- reapply every 2 to 3 hours or after sweating, drying off with a towel, or swimming  · You need 6736-3127 mg of calcium and 8476-1045 IU of vitamin D

## 2018-01-04 NOTE — PROGRESS NOTES
[facility]  History and Physical  [place]in[place] Rodney M.D. Harvel Olszewski  YOB: 1955    Date of Service:  1/4/2018    Chief Complaint:   Harvel Olszewski is a 65 y.o. female who presents for   Chief Complaint   Patient presents with    Annual Exam       HPI: Here for Annual Physical and complain of chronic lbp being seen by orthopedic. She complains of edema when she took Mobic but is able to take ibuprofen but only take 2 qd prn when it gets severe. Not been walking as much due to pain. Wt Readings from Last 3 Encounters:   01/04/18 192 lb (87.1 kg)   11/17/17 190 lb 0.6 oz (86.2 kg)   10/13/17 190 lb 0.6 oz (86.2 kg)     BP Readings from Last 3 Encounters:   01/04/18 (!) 128/94   11/17/17 117/75   10/13/17 128/89       Patient Active Problem List   Diagnosis    Osteopenia    Sciatica of right side       Allergies   Allergen Reactions    Cephalexin      Edema    Dye [Barium-Containing Compounds] Hives    Iodine      Rash    Lamisil [Terbinafine Hcl]      Itch and rash    Pcn [Penicillins]      Not sure     Outpatient Prescriptions Marked as Taking for the 1/4/18 encounter (Office Visit) with Didier Hensley MD   Medication Sig Dispense Refill    ibuprofen (ADVIL;MOTRIN) 200 MG tablet Take 200 mg by mouth every 6 hours as needed for Pain      cetirizine (ZYRTEC) 10 MG tablet Take 10 mg by mouth daily      diclofenac (PENNSAID) 2 % SOLN APPLY 40MG(2 PUMP ACTUATIONS) TO AFFECTED AREA TWO TIMES A DAY * DO NOT APPLY TO OPEN WOUND * WASH HANDS THOROUGHLY AFTER EACH USE * 112 g 2    OMEGA 3 1000 MG CAPS Take  by mouth.  Nutritional Supplements (GRAPESEED EXTRACT PO) Take  by mouth.  Digestive Enzymes (MULTI-ENZYME PO) Take  by mouth.  vitamin D (CHOLECALCIFEROL) 1000 UNIT TABS tablet Take 1,000 Units by mouth daily.  Coral Calcium 500 MG TABS Take 1,000 mg by mouth.  Glucosamine-Chondroitin 500-250 MG CAPS Take  by mouth.            Past

## 2018-01-05 ENCOUNTER — TELEPHONE (OUTPATIENT)
Dept: INTERNAL MEDICINE CLINIC | Age: 63
End: 2018-01-05

## 2018-01-05 NOTE — PROGRESS NOTES
Inform patient:  Your total cholesterol and triglyceride are a little high due to you not being active recently. Exercising more 30 minutes 5 days a week and cutting down on portion size along with low carbohydrates (avoiding sweets/alcohol) will help reduce triglyceride.   All your other labs are normal.

## 2018-02-12 ENCOUNTER — TELEPHONE (OUTPATIENT)
Dept: FAMILY MEDICINE CLINIC | Age: 63
End: 2018-02-12

## 2018-02-14 NOTE — TELEPHONE ENCOUNTER
Inform pt that she should contact Dr. Kristin Catherine office for results since he did the procedure so there's no miscommunication regarding the result since that's not my specialty.

## 2018-02-16 ENCOUNTER — HOSPITAL ENCOUNTER (OUTPATIENT)
Dept: GENERAL RADIOLOGY | Age: 63
Discharge: HOME | End: 2018-02-16
Attending: INTERNAL MEDICINE | Admitting: INTERNAL MEDICINE

## 2018-02-16 DIAGNOSIS — Z13.820 SCREENING FOR OSTEOPOROSIS: ICD-10-CM

## 2018-02-16 DIAGNOSIS — M85.80 OSTEOPENIA, UNSPECIFIED LOCATION: ICD-10-CM

## 2018-02-16 DIAGNOSIS — Z78.0 ASYMPTOMATIC MENOPAUSAL STATE: ICD-10-CM

## 2018-02-16 DIAGNOSIS — Z78.0 POSTMENOPAUSE: ICD-10-CM

## 2018-02-19 ENCOUNTER — TELEPHONE (OUTPATIENT)
Dept: ORTHOPEDIC SURGERY | Age: 63
End: 2018-02-19

## 2018-02-19 NOTE — PROGRESS NOTES
Pt has been informed. Regarding- Your bone density shows mild bone loss. I recommend you take at least some Calcium 1200 mg a day, Vit D 2000 mcg a day and weight bearing excercise if you have never had any broken bones as an adult. If you have had broken bones as an adult or you want early treatment, let me know.

## 2018-02-19 NOTE — PROGRESS NOTES
Inform patient:  Your bone density shows mild bone loss. I recommend you take at least some Calcium 1200 mg a day, Vit D 2000 mcg a day and weight bearing excercise if you have never had any broken bones as an adult. If you have had broken bones as an adult or you want early treatment, let me know.

## 2019-02-04 ENCOUNTER — NURSE TRIAGE (OUTPATIENT)
Dept: OTHER | Facility: CLINIC | Age: 64
End: 2019-02-04

## 2019-02-05 ENCOUNTER — HOSPITAL ENCOUNTER (OUTPATIENT)
Dept: WOMENS IMAGING | Age: 64
Discharge: HOME | End: 2019-02-05
Payer: COMMERCIAL

## 2019-02-05 ENCOUNTER — OFFICE VISIT (OUTPATIENT)
Dept: INTERNAL MEDICINE CLINIC | Age: 64
End: 2019-02-05
Payer: COMMERCIAL

## 2019-02-05 VITALS
HEIGHT: 67 IN | SYSTOLIC BLOOD PRESSURE: 130 MMHG | OXYGEN SATURATION: 98 % | HEART RATE: 72 BPM | DIASTOLIC BLOOD PRESSURE: 82 MMHG | WEIGHT: 197 LBS | BODY MASS INDEX: 30.92 KG/M2

## 2019-02-05 DIAGNOSIS — Z12.31 ENCOUNTER FOR SCREENING MAMMOGRAM FOR MALIGNANT NEOPLASM OF BREAST: ICD-10-CM

## 2019-02-05 DIAGNOSIS — T14.8XXA MUSCLE STRAIN: ICD-10-CM

## 2019-02-05 DIAGNOSIS — Z00.00 ANNUAL PHYSICAL EXAM: Primary | ICD-10-CM

## 2019-02-05 DIAGNOSIS — Z11.4 SCREENING FOR HIV (HUMAN IMMUNODEFICIENCY VIRUS): ICD-10-CM

## 2019-02-05 DIAGNOSIS — Z23 NEED FOR INFLUENZA VACCINATION: ICD-10-CM

## 2019-02-05 DIAGNOSIS — F43.22 ADJUSTMENT REACTION WITH ANXIETY: ICD-10-CM

## 2019-02-05 DIAGNOSIS — R10.11 RUQ PAIN: ICD-10-CM

## 2019-02-05 PROCEDURE — 99396 PREV VISIT EST AGE 40-64: CPT | Performed by: INTERNAL MEDICINE

## 2019-02-05 PROCEDURE — 90471 IMMUNIZATION ADMIN: CPT | Performed by: INTERNAL MEDICINE

## 2019-02-05 PROCEDURE — 77063 BREAST TOMOSYNTHESIS BI: CPT

## 2019-02-05 PROCEDURE — 90686 IIV4 VACC NO PRSV 0.5 ML IM: CPT | Performed by: INTERNAL MEDICINE

## 2019-02-05 PROCEDURE — 99214 OFFICE O/P EST MOD 30 MIN: CPT | Performed by: INTERNAL MEDICINE

## 2019-02-05 RX ORDER — ASPIRIN 81 MG/1
81 TABLET ORAL DAILY
Qty: 1 TABLET | Refills: 0 | COMMUNITY
Start: 2019-02-05

## 2019-02-06 ENCOUNTER — HOSPITAL ENCOUNTER (OUTPATIENT)
Dept: ULTRASOUND IMAGING | Age: 64
Discharge: HOME | End: 2019-02-06
Payer: COMMERCIAL

## 2019-02-06 ENCOUNTER — HOSPITAL ENCOUNTER (OUTPATIENT)
Age: 64
Discharge: HOME | End: 2019-02-06
Payer: COMMERCIAL

## 2019-02-06 DIAGNOSIS — R10.11 RUQ PAIN: ICD-10-CM

## 2019-02-06 DIAGNOSIS — Z11.4 SCREENING FOR HIV (HUMAN IMMUNODEFICIENCY VIRUS): ICD-10-CM

## 2019-02-06 DIAGNOSIS — R10.11 ABDOMINAL PAIN, RIGHT UPPER QUADRANT: ICD-10-CM

## 2019-02-06 DIAGNOSIS — Z00.00 ANNUAL PHYSICAL EXAM: ICD-10-CM

## 2019-02-06 LAB
A/G RATIO: 1.4 (ref 1.1–2.2)
ALBUMIN SERPL-MCNC: 4.6 G/DL (ref 3.4–5)
ALP BLD-CCNC: 96 U/L (ref 40–129)
ALT SERPL-CCNC: 24 U/L (ref 10–40)
AMYLASE: 71 U/L (ref 25–115)
ANION GAP SERPL CALCULATED.3IONS-SCNC: 12 MMOL/L (ref 3–16)
AST SERPL-CCNC: 22 U/L (ref 15–37)
BASOPHILS ABSOLUTE: 0.1 K/UL (ref 0–0.2)
BASOPHILS RELATIVE PERCENT: 1.6 %
BILIRUB SERPL-MCNC: 0.4 MG/DL (ref 0–1)
BUN BLDV-MCNC: 15 MG/DL (ref 7–20)
CALCIUM SERPL-MCNC: 9.5 MG/DL (ref 8.3–10.6)
CHLORIDE BLD-SCNC: 104 MMOL/L (ref 99–110)
CHOLESTEROL, TOTAL: 230 MG/DL (ref 0–199)
CO2: 28 MMOL/L (ref 21–32)
CREAT SERPL-MCNC: 1 MG/DL (ref 0.6–1.2)
EOSINOPHILS ABSOLUTE: 0.1 K/UL (ref 0–0.6)
EOSINOPHILS RELATIVE PERCENT: 3 %
GFR AFRICAN AMERICAN: >60
GFR NON-AFRICAN AMERICAN: 56
GLOBULIN: 3.2 G/DL
GLUCOSE BLD-MCNC: 102 MG/DL (ref 70–99)
HCT VFR BLD CALC: 42.4 % (ref 36–48)
HDLC SERPL-MCNC: 59 MG/DL (ref 40–60)
HEMOGLOBIN: 14.1 G/DL (ref 12–16)
HIV AG/AB: REACTIVE
HIV ANTIGEN: ABNORMAL
HIV-1 ANTIBODY: REACTIVE
HIV-2 AB: ABNORMAL
LDL CHOLESTEROL CALCULATED: 146 MG/DL
LIPASE: 50 U/L (ref 13–60)
LYMPHOCYTES ABSOLUTE: 1.4 K/UL (ref 1–5.1)
LYMPHOCYTES RELATIVE PERCENT: 28.8 %
MCH RBC QN AUTO: 29 PG (ref 26–34)
MCHC RBC AUTO-ENTMCNC: 33.2 G/DL (ref 31–36)
MCV RBC AUTO: 87.4 FL (ref 80–100)
MONOCYTES ABSOLUTE: 0.4 K/UL (ref 0–1.3)
MONOCYTES RELATIVE PERCENT: 8.4 %
NEUTROPHILS ABSOLUTE: 2.8 K/UL (ref 1.7–7.7)
NEUTROPHILS RELATIVE PERCENT: 58.2 %
PDW BLD-RTO: 13.6 % (ref 12.4–15.4)
PLATELET # BLD: 287 K/UL (ref 135–450)
PMV BLD AUTO: 7.8 FL (ref 5–10.5)
POTASSIUM SERPL-SCNC: 4.5 MMOL/L (ref 3.5–5.1)
RBC # BLD: 4.85 M/UL (ref 4–5.2)
SODIUM BLD-SCNC: 144 MMOL/L (ref 136–145)
TOTAL PROTEIN: 7.8 G/DL (ref 6.4–8.2)
TRIGL SERPL-MCNC: 125 MG/DL (ref 0–150)
VLDLC SERPL CALC-MCNC: 25 MG/DL
WBC # BLD: 4.7 K/UL (ref 4–11)

## 2019-02-06 PROCEDURE — 80061 LIPID PANEL: CPT

## 2019-02-06 PROCEDURE — 86702 HIV-2 ANTIBODY: CPT

## 2019-02-06 PROCEDURE — 87390 HIV-1 AG IA: CPT

## 2019-02-06 PROCEDURE — 80053 COMPREHEN METABOLIC PANEL: CPT

## 2019-02-06 PROCEDURE — 36415 COLL VENOUS BLD VENIPUNCTURE: CPT

## 2019-02-06 PROCEDURE — 86701 HIV-1ANTIBODY: CPT

## 2019-02-06 PROCEDURE — 76705 ECHO EXAM OF ABDOMEN: CPT

## 2019-02-06 PROCEDURE — 85025 COMPLETE CBC W/AUTO DIFF WBC: CPT

## 2019-02-06 PROCEDURE — 82150 ASSAY OF AMYLASE: CPT

## 2019-02-06 PROCEDURE — 83690 ASSAY OF LIPASE: CPT

## 2019-02-09 LAB
HIV 1/2 AB DIFF IMMUNOASSAY: NORMAL
HIV INTERPRETATION: NORMAL
HIV-1 ANTIBODY, SUPPLEMENTAL: NEGATIVE
HIV-2 ANTIBODY, SUPPLEMENTAL: NEGATIVE

## 2019-02-26 ENCOUNTER — OFFICE VISIT (OUTPATIENT)
Dept: INTERNAL MEDICINE CLINIC | Age: 64
End: 2019-02-26
Payer: COMMERCIAL

## 2019-02-26 VITALS
HEART RATE: 64 BPM | HEIGHT: 67 IN | WEIGHT: 198 LBS | DIASTOLIC BLOOD PRESSURE: 82 MMHG | BODY MASS INDEX: 31.08 KG/M2 | SYSTOLIC BLOOD PRESSURE: 128 MMHG | OXYGEN SATURATION: 99 %

## 2019-02-26 DIAGNOSIS — F43.22 ADJUSTMENT REACTION WITH ANXIETY: ICD-10-CM

## 2019-02-26 DIAGNOSIS — M85.80 OSTEOPENIA WITH HIGH RISK OF FRACTURE: Primary | ICD-10-CM

## 2019-02-26 PROCEDURE — 99213 OFFICE O/P EST LOW 20 MIN: CPT | Performed by: INTERNAL MEDICINE

## 2019-02-26 ASSESSMENT — PATIENT HEALTH QUESTIONNAIRE - PHQ9
SUM OF ALL RESPONSES TO PHQ QUESTIONS 1-9: 0
SUM OF ALL RESPONSES TO PHQ QUESTIONS 1-9: 0
2. FEELING DOWN, DEPRESSED OR HOPELESS: 0
SUM OF ALL RESPONSES TO PHQ9 QUESTIONS 1 & 2: 0
1. LITTLE INTEREST OR PLEASURE IN DOING THINGS: 0

## 2019-03-01 ENCOUNTER — TELEPHONE (OUTPATIENT)
Dept: INTERNAL MEDICINE CLINIC | Age: 64
End: 2019-03-01

## 2019-03-01 DIAGNOSIS — F43.22 ADJUSTMENT REACTION WITH ANXIETY: ICD-10-CM

## 2019-03-26 ENCOUNTER — OFFICE VISIT (OUTPATIENT)
Dept: INTERNAL MEDICINE CLINIC | Age: 64
End: 2019-03-26
Payer: COMMERCIAL

## 2019-03-26 VITALS
BODY MASS INDEX: 31.55 KG/M2 | OXYGEN SATURATION: 98 % | HEIGHT: 67 IN | HEART RATE: 78 BPM | SYSTOLIC BLOOD PRESSURE: 124 MMHG | WEIGHT: 201 LBS | DIASTOLIC BLOOD PRESSURE: 62 MMHG

## 2019-03-26 DIAGNOSIS — Z23 NEED FOR SHINGLES VACCINE: ICD-10-CM

## 2019-03-26 DIAGNOSIS — F43.22 ADJUSTMENT REACTION WITH ANXIETY: Primary | ICD-10-CM

## 2019-03-26 PROCEDURE — 99213 OFFICE O/P EST LOW 20 MIN: CPT | Performed by: INTERNAL MEDICINE

## 2019-03-26 PROCEDURE — 90471 IMMUNIZATION ADMIN: CPT | Performed by: INTERNAL MEDICINE

## 2019-03-26 PROCEDURE — 90750 HZV VACC RECOMBINANT IM: CPT | Performed by: INTERNAL MEDICINE

## 2019-06-18 ENCOUNTER — OFFICE VISIT (OUTPATIENT)
Dept: INTERNAL MEDICINE CLINIC | Age: 64
End: 2019-06-18
Payer: COMMERCIAL

## 2019-06-18 VITALS
HEIGHT: 67 IN | SYSTOLIC BLOOD PRESSURE: 124 MMHG | DIASTOLIC BLOOD PRESSURE: 68 MMHG | OXYGEN SATURATION: 98 % | HEART RATE: 71 BPM | WEIGHT: 201 LBS | BODY MASS INDEX: 31.55 KG/M2

## 2019-06-18 DIAGNOSIS — B07.8 OTHER VIRAL WARTS: ICD-10-CM

## 2019-06-18 DIAGNOSIS — F43.22 ADJUSTMENT REACTION WITH ANXIETY: Primary | ICD-10-CM

## 2019-06-18 DIAGNOSIS — Z23 NEED FOR SHINGLES VACCINE: ICD-10-CM

## 2019-06-18 PROCEDURE — 90471 IMMUNIZATION ADMIN: CPT | Performed by: INTERNAL MEDICINE

## 2019-06-18 PROCEDURE — 99213 OFFICE O/P EST LOW 20 MIN: CPT | Performed by: INTERNAL MEDICINE

## 2019-06-18 PROCEDURE — 90750 HZV VACC RECOMBINANT IM: CPT | Performed by: INTERNAL MEDICINE

## 2019-06-18 NOTE — PROGRESS NOTES
Jamarcus Avelar  YOB: 1955    Date of Service:  6/18/2019    Chief Complaint:      Chief Complaint   Patient presents with    Anxiety       HPI:  Jamarcus Avelar is a 62 y.o. She also complain of recurrent warts on bilateral thumb. She had it frozen off a few years ago and now it's back. Anxious mood:  Improved and rarely irritable on Zoloft 50 mg 1 qhs and sleeping better, getting about 6 hrs vs 4 hrs before start of medication.    Some bruising on left side but pain is better with Tyl prn.   Osteopenia with h/o Fracture left leg    No results found for: LABA1C, LABMICR  Lab Results   Component Value Date     02/06/2019    K 4.5 02/06/2019     02/06/2019    CO2 28 02/06/2019    BUN 15 02/06/2019    CREATININE 1.0 02/06/2019    GLUCOSE 102 (H) 02/06/2019    CALCIUM 9.5 02/06/2019     Lab Results   Component Value Date    CHOL 230 02/06/2019    TRIG 125 02/06/2019    HDL 59 02/06/2019    LDLCALC 146 02/06/2019     Lab Results   Component Value Date    ALT 24 02/06/2019    AST 22 02/06/2019     No results found for: TSH, T4FREE  Lab Results   Component Value Date    WBC 4.7 02/06/2019    HGB 14.1 02/06/2019    HCT 42.4 02/06/2019    MCV 87.4 02/06/2019     02/06/2019     No results found for: INR   No results found for: PSA   No results found for: [facility]     Patient Active Problem List   Diagnosis    Osteopenia with high risk of fracture    Chronic bilateral low back pain with right-sided sciatica       Allergies   Allergen Reactions    Cephalexin      Edema    Dye [Barium-Containing Compounds] Hives    Iodine      Rash    Lamisil [Terbinafine]      Itch and rash    Pcn [Penicillins]      Not sure     Outpatient Medications Marked as Taking for the 6/18/19 encounter (Office Visit) with Ariadna Hensley MD   Medication Sig Dispense Refill    sertraline (ZOLOFT) 50 MG tablet Take 1 tablet by mouth daily 30 tablet 2    aspirin EC 81 MG EC tablet Take 1 tablet by mouth daily 1 tablet 0    cetirizine (ZYRTEC) 10 MG tablet Take 10 mg by mouth daily      vitamin D (CHOLECALCIFEROL) 1000 UNIT TABS tablet Take 1,000 Units by mouth daily.  Glucosamine-Chondroitin 500-250 MG CAPS Take  by mouth. Review of Systems: 14 systems were negative except of what was stated on HPI    Nursing note and vitals reviewed. Vitals:    06/18/19 1125   BP: 124/68   Pulse: 71   SpO2: 98%   Weight: 201 lb (91.2 kg)   Height: 5' 7\" (1.702 m)     Wt Readings from Last 3 Encounters:   06/18/19 201 lb (91.2 kg)   03/26/19 201 lb (91.2 kg)   02/26/19 198 lb (89.8 kg)     BP Readings from Last 3 Encounters:   06/18/19 124/68   03/26/19 124/62   02/26/19 128/82     Body mass index is 31.48 kg/m². Constitutional: Patient appears well-developed and well-nourished. No distress. Head: Normocephalic and atraumatic. Neck: Normal range of motion. Neck supple. No thyroidmegaly. Cardiovascular: Normal rate, regular rhythm, normal heart sounds and intact distal pulses. Pulmonary/Chest: Effort normal and breath sounds normal. No stridor. No respiratory distress. No wheezes and no rales. Abdominal: Soft. Bowel sounds are normal. No distension and no mass. No tenderness. No rebound and no guarding. Musculoskeletal: No edema and no tenderness. Skin: No rash or erythema. Psychiatric: Normal mood and affect. Behavior is normal.   Small 2 mm wart on vuong aspect bilateral thumb    Assessment/Plan:  Malou Bains was seen today for anxiety. Diagnoses and all orders for this visit:    Adjustment reaction with anxiety  -     sertraline (ZOLOFT) 50 MG tablet; Take 1 tablet by mouth daily    Other viral warts    Need for shingles vaccine  -     Zoster recombinant [facility])        Return Fasting Physical in early Feb, for Wart removal 20 mins anytime.

## 2019-06-26 ENCOUNTER — OFFICE VISIT (OUTPATIENT)
Dept: INTERNAL MEDICINE CLINIC | Age: 64
End: 2019-06-26
Payer: COMMERCIAL

## 2019-06-26 VITALS
OXYGEN SATURATION: 98 % | WEIGHT: 200 LBS | SYSTOLIC BLOOD PRESSURE: 126 MMHG | DIASTOLIC BLOOD PRESSURE: 80 MMHG | BODY MASS INDEX: 31.39 KG/M2 | HEIGHT: 67 IN | HEART RATE: 68 BPM

## 2019-06-26 DIAGNOSIS — R23.8 SKIN IRRITATION: ICD-10-CM

## 2019-06-26 DIAGNOSIS — B07.9 VIRAL WARTS, UNSPECIFIED TYPE: Primary | ICD-10-CM

## 2019-06-26 PROCEDURE — 17110 DESTRUCTION B9 LES UP TO 14: CPT | Performed by: INTERNAL MEDICINE

## 2019-06-26 NOTE — PROGRESS NOTES
Nash Noriega  YOB: 1955    Date of Service:  6/26/2019    Chief Complaint:      Chief Complaint   Patient presents with    Procedure     wart removal \       HPI:  Nash Noriega is a 62 y.o. Here for wart removal on bilateral thumb that's gotten bigger recently    No results found for: LABA1C, LABMICR  Lab Results   Component Value Date     02/06/2019    K 4.5 02/06/2019     02/06/2019    CO2 28 02/06/2019    BUN 15 02/06/2019    CREATININE 1.0 02/06/2019    GLUCOSE 102 (H) 02/06/2019    CALCIUM 9.5 02/06/2019     Lab Results   Component Value Date    CHOL 230 02/06/2019    TRIG 125 02/06/2019    HDL 59 02/06/2019    LDLCALC 146 02/06/2019     Lab Results   Component Value Date    ALT 24 02/06/2019    AST 22 02/06/2019     No results found for: TSH, T4FREE  Lab Results   Component Value Date    WBC 4.7 02/06/2019    HGB 14.1 02/06/2019    HCT 42.4 02/06/2019    MCV 87.4 02/06/2019     02/06/2019     No results found for: INR   No results found for: PSA   No results found for: [address]     Patient Active Problem List   Diagnosis    Osteopenia with high risk of fracture    Chronic bilateral low back pain with right-sided sciatica       Allergies   Allergen Reactions    Cephalexin      Edema    Dye [Barium-Containing Compounds] Hives    Iodine      Rash    Lamisil [Terbinafine]      Itch and rash    Pcn [Penicillins]      Not sure     Outpatient Medications Marked as Taking for the 6/26/19 encounter (Office Visit) with Sunny Hensley MD   Medication Sig Dispense Refill    sertraline (ZOLOFT) 50 MG tablet Take 1 tablet by mouth daily 30 tablet 7    aspirin EC 81 MG EC tablet Take 1 tablet by mouth daily 1 tablet 0    cetirizine (ZYRTEC) 10 MG tablet Take 10 mg by mouth daily      vitamin D (CHOLECALCIFEROL) 1000 UNIT TABS tablet Take 1,000 Units by mouth daily.  Glucosamine-Chondroitin 500-250 MG CAPS Take  by mouth.              Review of Systems: 14 systems were negative except of what was stated on HPI    Nursing note and vitals reviewed. Vitals:    06/26/19 1118   BP: 126/80   Pulse: 68   SpO2: 98%   Weight: 200 lb (90.7 kg)   Height: 5' 7\" (1.702 m)     Wt Readings from Last 3 Encounters:   06/26/19 200 lb (90.7 kg)   06/18/19 201 lb (91.2 kg)   03/26/19 201 lb (91.2 kg)     BP Readings from Last 3 Encounters:   06/26/19 126/80   06/18/19 124/68   03/26/19 124/62     Body mass index is 31.32 kg/m². Constitutional: Patient appears well-developed and well-nourished. No distress. Wart cleaned with alcohol wipe. Area injected with Lidocaine 1 % with epinephrine on just left thumb. Wart 2-3 mm shaved down with #15 scapal in both thumb and then dry ice applied with Q Tip repeatedly (approximately 10 times). Bleed stop with Aluminium Chloride 35%. Band Aid placed. No complications. Assessment/Plan:  Chris Parsons was seen today for procedure. Diagnoses and all orders for this visit:    Viral warts, unspecified type  -     [place] DESTRUCTION BENIGN LESIONS UP TO 14    Skin irritation  -     [place] DESTRUCTION BENIGN LESIONS UP TO 14        Return if symptoms worsen or fail to improve.

## 2020-02-04 ENCOUNTER — OFFICE VISIT (OUTPATIENT)
Dept: INTERNAL MEDICINE CLINIC | Age: 65
End: 2020-02-04
Payer: COMMERCIAL

## 2020-02-04 VITALS
DIASTOLIC BLOOD PRESSURE: 84 MMHG | OXYGEN SATURATION: 99 % | HEIGHT: 67 IN | WEIGHT: 207 LBS | HEART RATE: 82 BPM | BODY MASS INDEX: 32.49 KG/M2 | SYSTOLIC BLOOD PRESSURE: 128 MMHG

## 2020-02-04 LAB
A/G RATIO: 1.6 (ref 1.1–2.2)
ALBUMIN SERPL-MCNC: 4.6 G/DL (ref 3.4–5)
ALP BLD-CCNC: 97 U/L (ref 40–129)
ALT SERPL-CCNC: 32 U/L (ref 10–40)
ANION GAP SERPL CALCULATED.3IONS-SCNC: 18 MMOL/L (ref 3–16)
AST SERPL-CCNC: 25 U/L (ref 15–37)
BASOPHILS ABSOLUTE: 0 K/UL (ref 0–0.2)
BASOPHILS RELATIVE PERCENT: 0.7 %
BILIRUB SERPL-MCNC: 0.4 MG/DL (ref 0–1)
BUN BLDV-MCNC: 12 MG/DL (ref 7–20)
CALCIUM SERPL-MCNC: 9.6 MG/DL (ref 8.3–10.6)
CHLORIDE BLD-SCNC: 102 MMOL/L (ref 99–110)
CHOLESTEROL, TOTAL: 242 MG/DL (ref 0–199)
CO2: 23 MMOL/L (ref 21–32)
CREAT SERPL-MCNC: 1 MG/DL (ref 0.6–1.2)
EOSINOPHILS ABSOLUTE: 0.1 K/UL (ref 0–0.6)
EOSINOPHILS RELATIVE PERCENT: 2.9 %
GFR AFRICAN AMERICAN: >60
GFR NON-AFRICAN AMERICAN: 56
GLOBULIN: 2.8 G/DL
GLUCOSE BLD-MCNC: 103 MG/DL (ref 70–99)
HCT VFR BLD CALC: 41.9 % (ref 36–48)
HDLC SERPL-MCNC: 52 MG/DL (ref 40–60)
HEMOGLOBIN: 13.7 G/DL (ref 12–16)
LDL CHOLESTEROL CALCULATED: 153 MG/DL
LYMPHOCYTES ABSOLUTE: 1.7 K/UL (ref 1–5.1)
LYMPHOCYTES RELATIVE PERCENT: 32.6 %
MCH RBC QN AUTO: 28.9 PG (ref 26–34)
MCHC RBC AUTO-ENTMCNC: 32.8 G/DL (ref 31–36)
MCV RBC AUTO: 88.1 FL (ref 80–100)
MONOCYTES ABSOLUTE: 0.4 K/UL (ref 0–1.3)
MONOCYTES RELATIVE PERCENT: 7.6 %
NEUTROPHILS ABSOLUTE: 2.9 K/UL (ref 1.7–7.7)
NEUTROPHILS RELATIVE PERCENT: 56.2 %
PDW BLD-RTO: 13.5 % (ref 12.4–15.4)
PLATELET # BLD: 279 K/UL (ref 135–450)
PMV BLD AUTO: 7.3 FL (ref 5–10.5)
POTASSIUM SERPL-SCNC: 4.1 MMOL/L (ref 3.5–5.1)
RBC # BLD: 4.75 M/UL (ref 4–5.2)
SODIUM BLD-SCNC: 143 MMOL/L (ref 136–145)
TOTAL PROTEIN: 7.4 G/DL (ref 6.4–8.2)
TRIGL SERPL-MCNC: 184 MG/DL (ref 0–150)
VLDLC SERPL CALC-MCNC: 37 MG/DL
WBC # BLD: 5.2 K/UL (ref 4–11)

## 2020-02-04 PROCEDURE — 36415 COLL VENOUS BLD VENIPUNCTURE: CPT | Performed by: INTERNAL MEDICINE

## 2020-02-04 PROCEDURE — 99396 PREV VISIT EST AGE 40-64: CPT | Performed by: INTERNAL MEDICINE

## 2020-02-04 ASSESSMENT — PATIENT HEALTH QUESTIONNAIRE - PHQ9
SUM OF ALL RESPONSES TO PHQ QUESTIONS 1-9: 0
2. FEELING DOWN, DEPRESSED OR HOPELESS: 0
SUM OF ALL RESPONSES TO PHQ QUESTIONS 1-9: 0
1. LITTLE INTEREST OR PLEASURE IN DOING THINGS: 0
SUM OF ALL RESPONSES TO PHQ9 QUESTIONS 1 & 2: 0

## 2020-02-04 NOTE — PATIENT INSTRUCTIONS
Preventive plan of care for Gaby Eisenberg        2/4/2020           Preventive Measures Status       Recommendations for screening                   Diabetes Screen  Glucose (mg/dL)   Date Value   02/06/2019 102 (H)    Test recommended and ordered   Cholesterol Screen  Lab Results   Component Value Date    CHOL 230 (H) 02/06/2019    TRIG 125 02/06/2019    HDL 59 02/06/2019    LDLCALC 146 (H) 02/06/2019    Test recommended and ordered       Weight: Body mass index is 32.42 kg/m².   5' 7\" (1.702 m)207 lb (93.9 kg)    Your BMI is 25 or greater, which indicates that you are overweight        Recommended Immunizations    Immunization History   Administered Date(s) Administered    Influenza Virus Vaccine 09/08/2015, 11/01/2019    Influenza, Cathy Beverage, IM, PF (6 mo and older Fluzone, Flulaval, Fluarix, and 3 yrs and older Afluria) 11/29/2016, 02/05/2019    Tdap (Boostrix, Adacel) 12/08/2007, 01/04/2018    Zoster Live (Zostavax) 12/02/2016    Zoster Recombinant (Shingrix) 03/26/2019, 06/18/2019        Influenza vaccine:  recommended every fall         Other Recommendations ·   See a dentist every 6 months  · Try to get at least 30 minutes of exercise 3-5 days per week  · Always wear a seat belt when traveling in a car  · Always wear a helmet when riding a bicycle or motorcycle  · When exposed to the sun, use a sunscreen that protects against both UVA and UVB radiation with an SPF of 30 or greater- reapply every 2 to 3 hours or after sweating, drying off with a towel, or swimming  · You need 8159-2886 mg of calcium and 8242-7259 IU of vitamin D per day- it is possible to meet your calcium requirement with diet alone, but a vitamin D supplement is usually necessary

## 2020-02-04 NOTE — PROGRESS NOTES
[facility]  History and Physical  Catrachito Truong M.D. Tex Mosqueda  YOB: 1955    Date of Service:  2/4/2020    Chief Complaint:   Tex Mosqueda is a 60 y.o. female who presents for   Chief Complaint   Patient presents with    Annual Exam       HPI: Here for Annual Physical and Follow up.     Anxious mood: stable off Zoloft 50 mg 1 qhs and sleeping better, getting about 6 hrs vs 4 hrs before start of medication.    Osteopenia with h/o Fracture left leg  The 10-year ASCVD risk score (Kyra Galvan, et al., 2013) is: 5.3%    Values used to calculate the score:      Age: 60 years      Sex: Female      Is Non- : No      Diabetic: No      Tobacco smoker: No      Systolic Blood Pressure: 589 mmHg      Is BP treated: No      HDL Cholesterol: 59 mg/dL      Total Cholesterol: 230 mg/dL    No results found for: LABA1C, LABMICR  Lab Results   Component Value Date     02/06/2019    K 4.5 02/06/2019     02/06/2019    CO2 28 02/06/2019    BUN 15 02/06/2019    CREATININE 1.0 02/06/2019    GLUCOSE 102 (H) 02/06/2019    CALCIUM 9.5 02/06/2019     Lab Results   Component Value Date    CHOL 230 02/06/2019    TRIG 125 02/06/2019    HDL 59 02/06/2019    LDLCALC 146 02/06/2019     Lab Results   Component Value Date    ALT 24 02/06/2019    AST 22 02/06/2019     No results found for: TSH, T4FREE  Lab Results   Component Value Date    WBC 4.7 02/06/2019    HGB 14.1 02/06/2019    HCT 42.4 02/06/2019    MCV 87.4 02/06/2019     02/06/2019     No results found for: INR   No results found for: PSA   No results found for: LABURIC     Wt Readings from Last 3 Encounters:   02/04/20 207 lb (93.9 kg)   06/26/19 200 lb (90.7 kg)   06/18/19 201 lb (91.2 kg)     BP Readings from Last 3 Encounters:   02/04/20 128/84   06/26/19 126/80   06/18/19 124/68       Patient Active Problem List   Diagnosis    Osteopenia with high risk of fracture    Chronic bilateral low back pain Grandmother     Stroke Paternal Grandmother     High Cholesterol Paternal Grandfather     Heart Disease Paternal Grandfather     Thyroid Disease Other         Cousin    Stroke Maternal Uncle      Social History     Socioeconomic History    Marital status:      Spouse name: Not on file    Number of children: Not on file    Years of education: Not on file    Highest education level: Not on file   Occupational History    Not on file   Social Needs    Financial resource strain: Not on file    Food insecurity:     Worry: Not on file     Inability: Not on file    Transportation needs:     Medical: Not on file     Non-medical: Not on file   Tobacco Use    Smoking status: Never Smoker    Smokeless tobacco: Never Used   Substance and Sexual Activity    Alcohol use: Yes     Alcohol/week: 0.0 standard drinks     Comment: rarely    Drug use: No    Sexual activity: Yes     Partners: Male   Lifestyle    Physical activity:     Days per week: Not on file     Minutes per session: Not on file    Stress: Not on file   Relationships    Social connections:     Talks on phone: Not on file     Gets together: Not on file     Attends Gnosticism service: Not on file     Active member of club or organization: Not on file     Attends meetings of clubs or organizations: Not on file     Relationship status: Not on file    Intimate partner violence:     Fear of current or ex partner: Not on file     Emotionally abused: Not on file     Physically abused: Not on file     Forced sexual activity: Not on file   Other Topics Concern    Not on file   Social History Narrative    Not on file       Review of Systems:  A comprehensive review of systems was negative except for what was noted in the HPI. Physical Exam:   Vitals:    02/04/20 0821   BP: 128/84   Pulse: 82   SpO2: 99%   Weight: 207 lb (93.9 kg)   Height: 5' 7\" (1.702 m)     Body mass index is 32.42 kg/m².    Constitutional: She is oriented to person, place, and abnormal PAP: no  Sexual activity: has sex with males   Last eye exam: 2019, normal  Exercise: no regular exercise       Preventive plan of care for Ilana Ward        2/4/2020           Preventive Measures Status       Recommendations for screening                   Diabetes Screen  Glucose (mg/dL)   Date Value   02/06/2019 102 (H)    Test recommended and ordered   Cholesterol Screen  Lab Results   Component Value Date    CHOL 230 (H) 02/06/2019    TRIG 125 02/06/2019    HDL 59 02/06/2019    LDLCALC 146 (H) 02/06/2019    Test recommended and ordered       Weight: Body mass index is 32.42 kg/m².   5' 7\" (1.702 m)207 lb (93.9 kg)    Your BMI is 25 or greater, which indicates that you are overweight        Recommended Immunizations    Immunization History   Administered Date(s) Administered    Influenza Virus Vaccine 09/08/2015, 11/01/2019    Influenza, Jagdish Candelario, IM, PF (6 mo and older Fluzone, Flulaval, Fluarix, and 3 yrs and older Afluria) 11/29/2016, 02/05/2019    Tdap (Boostrix, Adacel) 12/08/2007, 01/04/2018    Zoster Live (Zostavax) 12/02/2016    Zoster Recombinant (Shingrix) 03/26/2019, 06/18/2019        Influenza vaccine:  recommended every fall         Other Recommendations ·   See a dentist every 6 months  · Try to get at least 30 minutes of exercise 3-5 days per week  · Always wear a seat belt when traveling in a car  · Always wear a helmet when riding a bicycle or motorcycle  · When exposed to the sun, use a sunscreen that protects against both UVA and UVB radiation with an SPF of 30 or greater- reapply every 2 to 3 hours or after sweating, drying off with a towel, or swimming  · You need 9676-7892 mg of calcium and 6236-2277 IU of vitamin D per day- it is possible to meet your calcium requirement with diet alone, but a vitamin D supplement is usually necessary                 Assessment/Plan:    Adrienne Bhatti was seen today for annual exam.    Diagnoses and all orders for this visit:    Annual physical exam  -     Lipid Panel  -     Comprehensive Metabolic Panel  -     CBC Auto Differential      Return Fasting Welcome to [employer] Well[facility] in 1 year.

## 2020-02-05 NOTE — RESULT ENCOUNTER NOTE
Inform patient:  Your cholesterol is a little high. Try to be on a low cholesterol/fat diet and do aerobic exercise at least 30 mins 5 days a week.   Your sugar, kidneys, liver and blood count are normal.

## 2020-09-03 ENCOUNTER — TELEPHONE (OUTPATIENT)
Dept: INTERNAL MEDICINE CLINIC | Age: 65
End: 2020-09-03

## 2020-09-03 NOTE — TELEPHONE ENCOUNTER
Patient called in says overdue for dexa scan & mammogram. Please advise if can be order & scheduled ASAP. Advise if can be done at the same time?

## 2020-09-22 ENCOUNTER — HOSPITAL ENCOUNTER (OUTPATIENT)
Dept: WOMENS IMAGING | Age: 65
Discharge: HOME | End: 2020-09-22
Payer: COMMERCIAL

## 2020-09-22 PROCEDURE — 77063 BREAST TOMOSYNTHESIS BI: CPT

## 2020-09-22 PROCEDURE — 77080 DXA BONE DENSITY AXIAL: CPT

## 2020-09-24 ENCOUNTER — TELEPHONE (OUTPATIENT)
Dept: WOMENS IMAGING | Age: 65
End: 2020-09-24

## 2021-04-15 ENCOUNTER — OFFICE VISIT (OUTPATIENT)
Dept: INTERNAL MEDICINE CLINIC | Age: 66
End: 2021-04-15
Payer: MEDICARE

## 2021-04-15 VITALS
TEMPERATURE: 97.8 F | HEART RATE: 85 BPM | WEIGHT: 206 LBS | SYSTOLIC BLOOD PRESSURE: 124 MMHG | BODY MASS INDEX: 32.33 KG/M2 | HEIGHT: 67 IN | OXYGEN SATURATION: 98 % | DIASTOLIC BLOOD PRESSURE: 72 MMHG

## 2021-04-15 DIAGNOSIS — B07.9 WART ON THUMB: ICD-10-CM

## 2021-04-15 DIAGNOSIS — Z91.89 RISK FOR CORONARY ARTERY DISEASE LESS THAN 10% IN NEXT 10 YEARS: ICD-10-CM

## 2021-04-15 DIAGNOSIS — Z23 NEED FOR STREPTOCOCCUS PNEUMONIAE VACCINATION: ICD-10-CM

## 2021-04-15 DIAGNOSIS — E78.00 HYPERCHOLESTEREMIA: ICD-10-CM

## 2021-04-15 DIAGNOSIS — Z00.00 ROUTINE GENERAL MEDICAL EXAMINATION AT A HEALTH CARE FACILITY: Primary | ICD-10-CM

## 2021-04-15 LAB
A/G RATIO: 1.8 (ref 1.1–2.2)
ALBUMIN SERPL-MCNC: 4.5 G/DL (ref 3.4–5)
ALP BLD-CCNC: 90 U/L (ref 40–129)
ALT SERPL-CCNC: 22 U/L (ref 10–40)
ANION GAP SERPL CALCULATED.3IONS-SCNC: 12 MMOL/L (ref 3–16)
AST SERPL-CCNC: 25 U/L (ref 15–37)
BASOPHILS ABSOLUTE: 0.1 K/UL (ref 0–0.2)
BASOPHILS RELATIVE PERCENT: 1.3 %
BILIRUB SERPL-MCNC: <0.2 MG/DL (ref 0–1)
BUN BLDV-MCNC: 15 MG/DL (ref 7–20)
CALCIUM SERPL-MCNC: 8.7 MG/DL (ref 8.3–10.6)
CHLORIDE BLD-SCNC: 101 MMOL/L (ref 99–110)
CHOLESTEROL, TOTAL: 225 MG/DL (ref 0–199)
CO2: 25 MMOL/L (ref 21–32)
CREAT SERPL-MCNC: 0.9 MG/DL (ref 0.6–1.2)
EOSINOPHILS ABSOLUTE: 0.1 K/UL (ref 0–0.6)
EOSINOPHILS RELATIVE PERCENT: 2.9 %
GFR AFRICAN AMERICAN: >60
GFR NON-AFRICAN AMERICAN: >60
GLOBULIN: 2.5 G/DL
GLUCOSE BLD-MCNC: 96 MG/DL (ref 70–99)
HCT VFR BLD CALC: 40.5 % (ref 36–48)
HDLC SERPL-MCNC: 49 MG/DL (ref 40–60)
HEMOGLOBIN: 13.3 G/DL (ref 12–16)
LDL CHOLESTEROL CALCULATED: 145 MG/DL
LYMPHOCYTES ABSOLUTE: 1.5 K/UL (ref 1–5.1)
LYMPHOCYTES RELATIVE PERCENT: 28.7 %
MCH RBC QN AUTO: 29 PG (ref 26–34)
MCHC RBC AUTO-ENTMCNC: 32.8 G/DL (ref 31–36)
MCV RBC AUTO: 88.5 FL (ref 80–100)
MONOCYTES ABSOLUTE: 0.3 K/UL (ref 0–1.3)
MONOCYTES RELATIVE PERCENT: 6.6 %
NEUTROPHILS ABSOLUTE: 3.1 K/UL (ref 1.7–7.7)
NEUTROPHILS RELATIVE PERCENT: 60.5 %
PDW BLD-RTO: 13.1 % (ref 12.4–15.4)
PLATELET # BLD: 272 K/UL (ref 135–450)
PMV BLD AUTO: 7.9 FL (ref 5–10.5)
POTASSIUM SERPL-SCNC: 4.5 MMOL/L (ref 3.5–5.1)
RBC # BLD: 4.58 M/UL (ref 4–5.2)
SODIUM BLD-SCNC: 138 MMOL/L (ref 136–145)
TOTAL PROTEIN: 7 G/DL (ref 6.4–8.2)
TRIGL SERPL-MCNC: 156 MG/DL (ref 0–150)
VLDLC SERPL CALC-MCNC: 31 MG/DL
WBC # BLD: 5.1 K/UL (ref 4–11)

## 2021-04-15 PROCEDURE — G8427 DOCREV CUR MEDS BY ELIG CLIN: HCPCS | Performed by: INTERNAL MEDICINE

## 2021-04-15 PROCEDURE — 99213 OFFICE O/P EST LOW 20 MIN: CPT | Performed by: INTERNAL MEDICINE

## 2021-04-15 PROCEDURE — G0402 INITIAL PREVENTIVE EXAM: HCPCS | Performed by: INTERNAL MEDICINE

## 2021-04-15 PROCEDURE — 1123F ACP DISCUSS/DSCN MKR DOCD: CPT | Performed by: INTERNAL MEDICINE

## 2021-04-15 PROCEDURE — 1090F PRES/ABSN URINE INCON ASSESS: CPT | Performed by: INTERNAL MEDICINE

## 2021-04-15 PROCEDURE — 1036F TOBACCO NON-USER: CPT | Performed by: INTERNAL MEDICINE

## 2021-04-15 PROCEDURE — G8417 CALC BMI ABV UP PARAM F/U: HCPCS | Performed by: INTERNAL MEDICINE

## 2021-04-15 PROCEDURE — 36415 COLL VENOUS BLD VENIPUNCTURE: CPT | Performed by: INTERNAL MEDICINE

## 2021-04-15 PROCEDURE — 3017F COLORECTAL CA SCREEN DOC REV: CPT | Performed by: INTERNAL MEDICINE

## 2021-04-15 PROCEDURE — G8399 PT W/DXA RESULTS DOCUMENT: HCPCS | Performed by: INTERNAL MEDICINE

## 2021-04-15 PROCEDURE — 4040F PNEUMOC VAC/ADMIN/RCVD: CPT | Performed by: INTERNAL MEDICINE

## 2021-04-15 SDOH — ECONOMIC STABILITY: FOOD INSECURITY: WITHIN THE PAST 12 MONTHS, THE FOOD YOU BOUGHT JUST DIDN'T LAST AND YOU DIDN'T HAVE MONEY TO GET MORE.: NEVER TRUE

## 2021-04-15 SDOH — ECONOMIC STABILITY: TRANSPORTATION INSECURITY
IN THE PAST 12 MONTHS, HAS THE LACK OF TRANSPORTATION KEPT YOU FROM MEDICAL APPOINTMENTS OR FROM GETTING MEDICATIONS?: NO

## 2021-04-15 SDOH — ECONOMIC STABILITY: INCOME INSECURITY: HOW HARD IS IT FOR YOU TO PAY FOR THE VERY BASICS LIKE FOOD, HOUSING, MEDICAL CARE, AND HEATING?: NOT HARD AT ALL

## 2021-04-15 SDOH — ECONOMIC STABILITY: TRANSPORTATION INSECURITY
IN THE PAST 12 MONTHS, HAS LACK OF TRANSPORTATION KEPT YOU FROM MEETINGS, WORK, OR FROM GETTING THINGS NEEDED FOR DAILY LIVING?: NO

## 2021-04-15 ASSESSMENT — LIFESTYLE VARIABLES: HOW OFTEN DO YOU HAVE A DRINK CONTAINING ALCOHOL: 0

## 2021-04-15 ASSESSMENT — PATIENT HEALTH QUESTIONNAIRE - PHQ9
1. LITTLE INTEREST OR PLEASURE IN DOING THINGS: 0
SUM OF ALL RESPONSES TO PHQ QUESTIONS 1-9: 0
SUM OF ALL RESPONSES TO PHQ QUESTIONS 1-9: 0

## 2021-04-15 NOTE — PROGRESS NOTES
Medicare Annual Wellness Visit  Name: Donn Amor Date: 4/15/2021   MRN: <[id number]> Sex: Female   Age: 73 y.o. Ethnicity: Non-/Non    : 1955 Race: Henrique Gonzales is here for Medicare AWV    Screenings for behavioral, psychosocial and functional/safety risks, and cognitive dysfunction are all negative except as indicated below. These results, as well as other patient data from the [address] form, are documented in Flowsheets linked to this Encounter. Allergies   Allergen Reactions    Cephalexin      Edema    Dye [Barium-Containing Compounds] Hives    Iodine      Rash    Lamisil [Terbinafine]      Itch and rash    Pcn [Penicillins]      Not sure       Prior to Visit Medications    Medication Sig Taking? Authorizing Provider   cetirizine (ZYRTEC) 10 MG tablet Take 10 mg by mouth daily Yes Historical Provider, MD   sertraline (ZOLOFT) 50 MG tablet Take 1 tablet by mouth daily  Patient not taking: Reported on 2020  Amber Hensley MD   aspirin EC 81 MG EC tablet Take 1 tablet by mouth daily  Patient not taking: Reported on 4/15/2021  Amber Hensley MD   vitamin D (CHOLECALCIFEROL) 1000 UNIT TABS tablet Take 1,000 Units by mouth daily. Historical Provider, MD   Glucosamine-Chondroitin 500-250 MG CAPS Take  by mouth.     Historical Provider, MD       Past Medical History:   Diagnosis Date    Chronic bilateral low back pain with right-sided sciatica     DJD (degenerative joint disease) of knee     GERD (gastroesophageal reflux disease)     HIV-1 infection ([facility] [address] 2019    + Ag/Ab, HIV 1 antibody reactive    Osteopenia     Postmenopausal HRT (hormone replacement therapy)        Past Surgical History:   Procedure Laterality Date    COLONOSCOPY  2008    Dr. Trevor Ardon       Family History   Problem Relation Age of Onset    Breast Cancer Other     High Cholesterol Brother     Stroke Brother     Other Brother         Carotid Stenosis    High Blood Pressure Brother     High Cholesterol Sister     Asthma Sister     Alcohol Abuse Sister     Lung Cancer Father     Depression Father         Listed with Anxiety    Alcohol Abuse Father     Depression Mother         Listed with Anxiety    Breast Cancer Mother         Late 52's    Alcohol Abuse Mother     High Cholesterol Maternal Grandmother     High Blood Pressure Maternal Grandmother     Heart Disease Maternal Grandmother     High Cholesterol Maternal Grandfather     Heart Disease Maternal Grandfather     High Cholesterol Paternal Grandmother     Heart Disease Paternal Grandmother         Carotid Artery Stenosis    COPD Paternal Grandmother     Stroke Paternal Grandmother     High Cholesterol Paternal Grandfather     Heart Disease Paternal Grandfather     Thyroid Disease Other         Cousin    Stroke Maternal Uncle        CareTeam (Including outside providers/suppliers regularly involved in providing care):   Patient Care Team:  Caroline Hensley MD as PCP - [facility]  Caroline Hensley MD as PCP - [facility] Empaneled Provider  Tish Small MD (Orthopedic Surgery)  Laura Driscoll MD as Consulting Physician (Obstetrics & Gynecology)    Wt Readings from Last 3 Encounters:   04/15/21 206 lb (93.4 kg)   02/04/20 207 lb (93.9 kg)   06/26/19 200 lb (90.7 kg)     Vitals:    04/15/21 0817   BP: 124/72   Pulse: 85   Temp: 97.8 °F (36.6 °C)   TempSrc: Infrared   SpO2: 98%   Weight: 206 lb (93.4 kg)   Height: 5' 7\" (1.702 m)     Body mass index is 32.26 kg/m². Based upon direct observation of the patient, evaluation of cognition reveals recent and remote memory intact. Patient's complete Health Risk Assessment and screening values have been reviewed and are found in Flowsheets. The following problems were reviewed today and where indicated follow up appointments were made and/or referrals ordered.     Positive Risk Factor Screenings with Interventions:          General Health and ACP:  General In general, how would you say your health is?: Good  In the past 7 days, have you experienced any of the following?  New or Increased Pain, New or Increased Fatigue, Loneliness, Social Isolation, Stress or Anger?: (!) Stress( has been ill and she states that it stresses her sometimes.)  Do you get the social and emotional support that you need?: Yes  Do you have a Living Will?: Yes  Advance Directives     Power of  Living Will ACP-Advance Directive ACP-Power of     Not on File Not on File Not on File Not on File      General Health Risk Interventions:  · Full Code    Health Habits/Nutrition:  Health Habits/Nutrition  Do you exercise for at least 20 minutes 2-3 times per week?: (!) No(states she has been busy but needs to start)  Have you lost any weight without trying in the past 3 months?: No  Do you eat only one meal per day?: No  Have you seen the dentist within the past year?: Yes  Body mass index: (!) 32.26  Health Habits/Nutrition Interventions:  · Inadequate physical activity:  patient agrees to exercise for at least 150 minutes/week       Personalized Preventive Plan   Current Health Maintenance Status  Immunization History   Administered Date(s) Administered    COVID-19, Pfizer, PF, 30mcg/0.3mL 03/16/2021, 04/06/2021    Influenza Virus Vaccine 09/08/2015, 11/01/2019    Influenza, Quadv, IM, PF (6 mo and older Fluzone, Flulaval, Fluarix, and 3 yrs and older Afluria) 11/29/2016, 02/05/2019    Tdap (Boostrix, Adacel) 12/08/2007, 01/04/2018    Zoster Live (Zostavax) 12/02/2016    Zoster Recombinant (Shingrix) 03/26/2019, 06/18/2019        Health Maintenance   Topic Date Due    Diabetes screen  Never done    Pneumococcal 65+ years Vaccine (1 of 1 - PPSV23) Never done    Breast cancer screen  09/22/2021    Cervical cancer screen  01/01/2022    Colon cancer screen colonoscopy  02/07/2023    Lipid screen  02/04/2025    DTaP/Tdap/Td vaccine (3 - Td) 01/04/2028    DEXA (modify frequency per FRAX score)  Completed    Flu vaccine  Completed    Shingles Vaccine  Completed    COVID-19 Vaccine  Completed    Hepatitis C screen  Completed    HIV screen  Completed    Hepatitis A vaccine  Aged Out    Hepatitis B vaccine  Aged Out    Hib vaccine  Aged Out    Meningococcal (ACWY) vaccine  Aged Out     Recommendations for Syntaxin Due: see orders and patient instructions/AVS.  . Recommended screening schedule for the next 5-10 years is provided to the patient in written form: see Patient Instructions/AVS.    Lavelle Benitez was seen today for medicare aw.     Diagnoses and all orders for this visit:    Routine general medical examination at a health care facility

## 2021-04-15 NOTE — PATIENT INSTRUCTIONS
Personalized Preventive Plan for Noel Hernández - 4/15/2021  Medicare offers a range of preventive health benefits. Some of the tests and screenings are paid in full while other may be subject to a deductible, co-insurance, and/or copay. Some of these benefits include a comprehensive review of your medical history including lifestyle, illnesses that may run in your family, and various assessments and screenings as appropriate. After reviewing your medical record and screening and assessments performed today your provider may have ordered immunizations, labs, imaging, and/or referrals for you. A list of these orders (if applicable) as well as your Preventive Care list are included within your After Visit Summary for your review. Other Preventive Recommendations:    · A preventive eye exam performed by an eye specialist is recommended every 1-2 years to screen for glaucoma; cataracts, macular degeneration, and other eye disorders. · A preventive dental visit is recommended every 6 months. · Try to get at least 150 minutes of exercise per week or 10,000 steps per day on a pedometer . · Order or download the FREE \"Exercise & Physical Activity: Your Everyday Guide\" from [employer]. Call 1-134.576.9134 or search [employer] online. · You need 2475-1128 mg of calcium and 2518-2233 IU of vitamin D per day. It is possible to meet your calcium requirement with diet alone, but a vitamin D supplement is usually necessary to meet this goal.  · When exposed to the sun, use a sunscreen that protects against both UVA and UVB radiation with an SPF of 30 or greater. Reapply every 2 to 3 hours or after sweating, drying off with a towel, or swimming. · Always wear a seat belt when traveling in a car. Always wear a helmet when riding a bicycle or motorcycle.

## 2021-04-15 NOTE — PROGRESS NOTES
Ashlee Wilson  YOB: 1955    Date of Service:  4/15/2021    Chief Complaint:      Chief Complaint   Patient presents with    Medicare AWV       HPI:  Ashlee Wilson is a 73 y.o. She also complains of left thumb wart she would like to remove. She has used otc liquid wart without much success. Osteopenia with h/o Fracture left leg:  Mild bone loss on DEXA 2020, recommed pt take Calcium plus D along with weight bearing exercise. Hyperlipidemia:  Patient is  following a low fat, low cholesterol diet. She is  exercising regularly. OTC Supplements: none.   The 10-year ASCVD risk score (Krista Holden, et al., 2013) is: 5.9%    Values used to calculate the score:      Age: 73 years      Sex: Female      Is Non- : No      Diabetic: No      Tobacco smoker: No      Systolic Blood Pressure: 740 mmHg      Is BP treated: No      HDL Cholesterol: 52 mg/dL      Total Cholesterol: 242 mg/dL      No results found for: LABA1C, LABMICR  Lab Results   Component Value Date     02/04/2020    K 4.1 02/04/2020     02/04/2020    CO2 23 02/04/2020    BUN 12 02/04/2020    CREATININE 1.0 02/04/2020    GLUCOSE 103 (H) 02/04/2020    CALCIUM 9.6 02/04/2020     Lab Results   Component Value Date    CHOL 242 02/04/2020    TRIG 184 02/04/2020    HDL 52 02/04/2020    [address] 153 02/04/2020     Lab Results   Component Value Date    ALT 32 02/04/2020    AST 25 02/04/2020     No results found for: TSH, T4FREE  Lab Results   Component Value Date    WBC 5.2 02/04/2020    HGB 13.7 02/04/2020    HCT 41.9 02/04/2020    MCV 88.1 02/04/2020     02/04/2020     No results found for: INR   No results found for: PSA   No results found for: LABURIC     Patient Active Problem List   Diagnosis    Osteopenia with high risk of fracture    Chronic bilateral low back pain with right-sided sciatica       Allergies   Allergen Reactions    Cephalexin      Edema    Dye [Barium-Containing Compounds] Hives    Iodine      Rash    Lamisil [Terbinafine]      Itch and rash    Pcn [Penicillins]      Not sure     Outpatient Medications Marked as Taking for the 4/15/21 encounter (Office Visit) with Cyndi Hensley MD   Medication Sig Dispense Refill    cetirizine (ZYRTEC) 10 MG tablet Take 10 mg by mouth daily           Review of Systems: 14 systems were negative except of what was stated on HPI    Nursing note and vitals reviewed. Vitals:    04/15/21 0817   BP: 124/72   Pulse: 85   Temp: 97.8 °F (36.6 °C)   TempSrc: Infrared   SpO2: 98%   Weight: 206 lb (93.4 kg)   Height: 5' 7\" (1.702 m)     Wt Readings from Last 3 Encounters:   04/15/21 206 lb (93.4 kg)   02/04/20 207 lb (93.9 kg)   06/26/19 200 lb (90.7 kg)     BP Readings from Last 3 Encounters:   04/15/21 124/72   02/04/20 128/84   06/26/19 126/80     Body mass index is 32.26 kg/m². Constitutional: Patient appears well-developed and well-nourished. No distress. Head: Normocephalic and atraumatic. Neck: Normal range of motion. Neck supple. No thyroidmegaly. Cardiovascular: Normal rate, regular rhythm, normal heart sounds and intact distal pulses. Pulmonary/Chest: Effort normal and breath sounds normal. No stridor. No respiratory distress. No wheezes and no rales. Abdominal: Soft. Bowel sounds are normal. No distension and no mass. No tenderness. No rebound and no guarding. Musculoskeletal: No edema and no tenderness. Skin: No rash or erythema. Psychiatric: Normal mood and affect. Behavior is normal.     Assessment/Plan:  Kareen Mandujano was seen today for medicare aw. Diagnoses and all orders for this visit:    Routine general medical examination at a health care facility    Need for Streptococcus pneumoniae vaccination  -     PREVNAR 13 IM (Pneumococcal conjugate vaccine 13-valent);  Future    Risk for coronary artery disease less than 10% in next 10 years    Hypercholesteremia  -     Lipid Panel  -     Comprehensive Metabolic Panel  -     CBC Auto

## 2021-04-16 DIAGNOSIS — E78.00 HYPERCHOLESTEREMIA: Primary | ICD-10-CM

## 2021-04-16 DIAGNOSIS — Z91.89 RISK FOR CORONARY ARTERY DISEASE LESS THAN 10% IN NEXT 10 YEARS: ICD-10-CM

## 2021-04-16 RX ORDER — ROSUVASTATIN CALCIUM 5 MG/1
5 TABLET, COATED ORAL NIGHTLY
Qty: 30 TABLET | Refills: 11 | Status: SHIPPED | OUTPATIENT
Start: 2021-04-16 | End: 2022-04-14 | Stop reason: SDUPTHER

## 2021-04-16 NOTE — RESULT ENCOUNTER NOTE
Inform patient:  Your cholesterol and triglyceride did go down from last year but it's still a little high. Since your risk of heart disease is still mildly high, I will start you on a cholesterol medication call Crestor 5 mg daily to reduce your risk. Try to be on a low cholesterol/fat diet and do aerobic exercise at least 30 mins 5 days a week.  Your sugar, kidneys, liver and blood count are normal.

## 2021-05-06 ENCOUNTER — OFFICE VISIT (OUTPATIENT)
Dept: INTERNAL MEDICINE CLINIC | Age: 66
End: 2021-05-06
Payer: MEDICARE

## 2021-05-06 VITALS
SYSTOLIC BLOOD PRESSURE: 117 MMHG | WEIGHT: 207 LBS | DIASTOLIC BLOOD PRESSURE: 74 MMHG | BODY MASS INDEX: 32.49 KG/M2 | HEIGHT: 67 IN

## 2021-05-06 DIAGNOSIS — R23.8 SKIN IRRITATION: ICD-10-CM

## 2021-05-06 DIAGNOSIS — B07.9 VIRAL WART ON LEFT THUMB: Primary | ICD-10-CM

## 2021-05-06 DIAGNOSIS — Z23 NEED FOR STREPTOCOCCUS PNEUMONIAE VACCINATION: ICD-10-CM

## 2021-05-06 PROCEDURE — 17110 DESTRUCTION B9 LES UP TO 14: CPT | Performed by: INTERNAL MEDICINE

## 2021-05-06 PROCEDURE — G0009 ADMIN PNEUMOCOCCAL VACCINE: HCPCS | Performed by: INTERNAL MEDICINE

## 2021-05-06 PROCEDURE — 90670 PCV13 VACCINE IM: CPT | Performed by: INTERNAL MEDICINE

## 2021-05-06 NOTE — PROGRESS NOTES
Micaela Lockhart  YOB: 1955    Date of Service:  5/6/2021    Chief Complaint:      Chief Complaint   Patient presents with    Procedure     wart removal left thumb       HPI:  Micaela Lockhart is a 73 y.o. She complains of a wart on her left thumb for a long time and getting bigger. She has tried otc wart removal without improvement.     No results found for: LABA1C, LABMICR  Lab Results   Component Value Date     04/15/2021    K 4.5 04/15/2021     04/15/2021    CO2 25 04/15/2021    BUN 15 04/15/2021    CREATININE 0.9 04/15/2021    GLUCOSE 96 04/15/2021    CALCIUM 8.7 04/15/2021     Lab Results   Component Value Date    CHOL 225 04/15/2021    TRIG 156 04/15/2021    HDL 49 04/15/2021    LDLCALC 145 04/15/2021     Lab Results   Component Value Date    ALT 22 04/15/2021    AST 25 04/15/2021     No results found for: TSH, T4FREE  Lab Results   Component Value Date    WBC 5.1 04/15/2021    HGB 13.3 04/15/2021    HCT 40.5 04/15/2021    MCV 88.5 04/15/2021     04/15/2021     No results found for: INR   No results found for: PSA   No results found for: LABURIC     Patient Active Problem List   Diagnosis    Osteopenia with high risk of fracture    Chronic bilateral low back pain with right-sided sciatica    Hypercholesteremia    Risk for coronary artery disease less than 10% in next 10 years       Allergies   Allergen Reactions    Cephalexin      Edema    Dye [Barium-Containing Compounds] Hives    Iodine      Rash    Lamisil [Terbinafine]      Itch and rash    Pcn [Penicillins]      Not sure     Outpatient Medications Marked as Taking for the 5/6/21 encounter (Office Visit) with Farhana Hensley MD   Medication Sig Dispense Refill    rosuvastatin (CRESTOR) 5 MG tablet Take 1 tablet by mouth nightly 30 tablet 11    aspirin EC 81 MG EC tablet Take 1 tablet by mouth daily 1 tablet 0    cetirizine (ZYRTEC) 10 MG tablet Take 10 mg by mouth daily      vitamin D (CHOLECALCIFEROL) 1000 UNIT TABS tablet Take 1,000 Units by mouth daily. Review of Systems: 14 systems were negative except of what was stated on HPI    Nursing note and vitals reviewed. Vitals:    05/06/21 0949   BP: 117/74   Site: Left Upper Arm   Position: Sitting   Cuff Size: Large Adult   Weight: 207 lb (93.9 kg)   Height: 5' 7\" (1.702 m)     Wt Readings from Last 3 Encounters:   05/06/21 207 lb (93.9 kg)   04/15/21 206 lb (93.4 kg)   02/04/20 207 lb (93.9 kg)     BP Readings from Last 3 Encounters:   05/06/21 117/74   04/15/21 124/72   02/04/20 128/84     Body mass index is 32.42 kg/m². Wart cleaned with alcohol wipe. Wart shaved down with #15 scapal and then dry ice applied with Q Tip repeatedly (approximately 30 times). Bleed stop with Aluminium Chloride 35%. Band Aid placed. No complications. Dx: Leobardo Parrish and   R23.8 Skin irritation  R20.8 Other disturbances of skin  R52 Pain, unspecified    Assessment/Plan:  Brissa Parks was seen today for procedure. Diagnoses and all orders for this visit:    Viral wart on left thumb  -     RI DESTRUCTION BENIGN LESIONS UP TO 14    Skin irritation  -     RI DESTRUCTION BENIGN LESIONS UP TO 14        No follow-ups on file.

## 2021-06-17 ENCOUNTER — TELEPHONE (OUTPATIENT)
Dept: INTERNAL MEDICINE CLINIC | Age: 66
End: 2021-06-17

## 2021-06-17 NOTE — TELEPHONE ENCOUNTER
She can try getting off while out of town if she's going to be on her feet a lot to see if she still swells up and restart when back in town.

## 2021-06-17 NOTE — TELEPHONE ENCOUNTER
Inform pt that the crestor usually doesn't cause leg swelling an if it did, it wouldn't get better if she's still on it. Leg swelling could be a circulation problem and try to not eat so much salt and elevate legs whenever sitting. Could also wear compression stockings if she's going to be on her feet a lot.

## 2021-06-17 NOTE — TELEPHONE ENCOUNTER
YOLA  Patient informed. Still concerned but will see how things progress and let us know. Patient notes she will be heading out of town this weekend til next Friday.

## 2021-06-17 NOTE — TELEPHONE ENCOUNTER
Patient has been experiencing leg swelling and yesterday was the worst. Patient concerned could be due to new medication ROSUVASTATIN. Swelling is better today though. Please advise.

## 2021-10-12 ENCOUNTER — OFFICE VISIT (OUTPATIENT)
Dept: INTERNAL MEDICINE CLINIC | Age: 66
End: 2021-10-12
Payer: MEDICARE

## 2021-10-12 VITALS
WEIGHT: 203 LBS | HEIGHT: 67 IN | HEART RATE: 74 BPM | BODY MASS INDEX: 31.86 KG/M2 | DIASTOLIC BLOOD PRESSURE: 80 MMHG | OXYGEN SATURATION: 98 % | SYSTOLIC BLOOD PRESSURE: 130 MMHG

## 2021-10-12 DIAGNOSIS — E78.00 HYPERCHOLESTEREMIA: Primary | ICD-10-CM

## 2021-10-12 DIAGNOSIS — Z91.89 RISK FOR CORONARY ARTERY DISEASE LESS THAN 10% IN NEXT 10 YEARS: ICD-10-CM

## 2021-10-12 LAB
ALBUMIN SERPL-MCNC: 4.7 G/DL (ref 3.4–5)
ALP BLD-CCNC: 86 U/L (ref 40–129)
ALT SERPL-CCNC: 18 U/L (ref 10–40)
AST SERPL-CCNC: 20 U/L (ref 15–37)
BILIRUB SERPL-MCNC: 0.5 MG/DL (ref 0–1)
BILIRUBIN DIRECT: <0.2 MG/DL (ref 0–0.3)
BILIRUBIN, INDIRECT: NORMAL MG/DL (ref 0–1)
CHOLESTEROL, TOTAL: 145 MG/DL (ref 0–199)
HDLC SERPL-MCNC: 53 MG/DL (ref 40–60)
LDL CHOLESTEROL CALCULATED: 69 MG/DL
TOTAL PROTEIN: 7.5 G/DL (ref 6.4–8.2)
TRIGL SERPL-MCNC: 115 MG/DL (ref 0–150)
VLDLC SERPL CALC-MCNC: 23 MG/DL

## 2021-10-12 PROCEDURE — 1123F ACP DISCUSS/DSCN MKR DOCD: CPT | Performed by: INTERNAL MEDICINE

## 2021-10-12 PROCEDURE — 36415 COLL VENOUS BLD VENIPUNCTURE: CPT | Performed by: INTERNAL MEDICINE

## 2021-10-12 PROCEDURE — G8399 PT W/DXA RESULTS DOCUMENT: HCPCS | Performed by: INTERNAL MEDICINE

## 2021-10-12 PROCEDURE — 4040F PNEUMOC VAC/ADMIN/RCVD: CPT | Performed by: INTERNAL MEDICINE

## 2021-10-12 PROCEDURE — 99212 OFFICE O/P EST SF 10 MIN: CPT | Performed by: INTERNAL MEDICINE

## 2021-10-12 PROCEDURE — G8417 CALC BMI ABV UP PARAM F/U: HCPCS | Performed by: INTERNAL MEDICINE

## 2021-10-12 PROCEDURE — 3017F COLORECTAL CA SCREEN DOC REV: CPT | Performed by: INTERNAL MEDICINE

## 2021-10-12 PROCEDURE — 1036F TOBACCO NON-USER: CPT | Performed by: INTERNAL MEDICINE

## 2021-10-12 PROCEDURE — G8427 DOCREV CUR MEDS BY ELIG CLIN: HCPCS | Performed by: INTERNAL MEDICINE

## 2021-10-12 PROCEDURE — G8484 FLU IMMUNIZE NO ADMIN: HCPCS | Performed by: INTERNAL MEDICINE

## 2021-10-12 PROCEDURE — 1090F PRES/ABSN URINE INCON ASSESS: CPT | Performed by: INTERNAL MEDICINE

## 2021-10-12 NOTE — PROGRESS NOTES
Maximilian Durham  YOB: 1955    Date of Service:  10/12/2021    Chief Complaint:      Chief Complaint   Patient presents with    Hyperlipidemia    Other     wart follow up        Assessment/Plan:  Doni Boothe was seen today for hyperlipidemia and other. Diagnoses and all orders for this visit:    Hypercholesteremia  -     Lipid Panel  -     HEPATIC FUNCTION PANEL    Risk for coronary artery disease less than 10% in next 10 years  -     Lipid Panel  -     HEPATIC FUNCTION PANEL    Stable and continue on current medications. Return 4/15 or after Fasting Medicare Wellness and f/u. HPI:  Maximilian Durham is a 73 y.o. Osteopenia with h/o Fracture left leg:  Mild bone loss on DEXA 2020, recommed pt take Calcium plus D along with weight bearing exercise. Hyperlipidemia:  No new myalgias or GI upset on rosuvastatin (Crestor) 5 mg qd. Medication compliance: compliant most of the time. Patient is  following a low fat, low cholesterol diet. She is  exercising regularly.      The 10-year ASCVD risk score (Marilia Sands, et al., 2013) is: 6.4%    Values used to calculate the score:      Age: 73 years      Sex: Female      Is Non- : No      Diabetic: No      Tobacco smoker: No      Systolic Blood Pressure: 895 mmHg      Is BP treated: No      HDL Cholesterol: 49 mg/dL      Total Cholesterol: 225 mg/dL    No results found for: LABA1C, LABMICR  Lab Results   Component Value Date     04/15/2021    K 4.5 04/15/2021     04/15/2021    CO2 25 04/15/2021    BUN 15 04/15/2021    CREATININE 0.9 04/15/2021    GLUCOSE 96 04/15/2021    CALCIUM 8.7 04/15/2021     Lab Results   Component Value Date    CHOL 225 04/15/2021    TRIG 156 04/15/2021    HDL 49 04/15/2021    LDLCALC 145 04/15/2021     Lab Results   Component Value Date    ALT 22 04/15/2021    AST 25 04/15/2021     No results found for: TSH, T4FREE  Lab Results   Component Value Date    WBC 5.1 04/15/2021    HGB 13.3 no rales. Abdominal: Soft. Bowel sounds are normal. No distension and no mass. No tenderness. No rebound and no guarding. Musculoskeletal: No edema and no tenderness. Skin: No rash or erythema.

## 2022-04-14 ENCOUNTER — OFFICE VISIT (OUTPATIENT)
Dept: INTERNAL MEDICINE CLINIC | Age: 67
End: 2022-04-14
Payer: MEDICARE

## 2022-04-14 ENCOUNTER — HOSPITAL ENCOUNTER (OUTPATIENT)
Dept: WOMENS IMAGING | Age: 67
Discharge: HOME | End: 2022-04-14
Payer: MEDICARE

## 2022-04-14 ENCOUNTER — TELEPHONE (OUTPATIENT)
Dept: INTERNAL MEDICINE CLINIC | Age: 67
End: 2022-04-14

## 2022-04-14 VITALS — HEIGHT: 68 IN | WEIGHT: 200 LBS | BODY MASS INDEX: 30.31 KG/M2

## 2022-04-14 VITALS
SYSTOLIC BLOOD PRESSURE: 118 MMHG | HEART RATE: 79 BPM | OXYGEN SATURATION: 98 % | BODY MASS INDEX: 32.8 KG/M2 | DIASTOLIC BLOOD PRESSURE: 78 MMHG | HEIGHT: 67 IN | WEIGHT: 209 LBS

## 2022-04-14 DIAGNOSIS — Z12.31 ENCOUNTER FOR SCREENING MAMMOGRAM FOR BREAST CANCER: ICD-10-CM

## 2022-04-14 DIAGNOSIS — E78.00 HYPERCHOLESTEREMIA: Primary | ICD-10-CM

## 2022-04-14 DIAGNOSIS — Z91.89 RISK FOR CORONARY ARTERY DISEASE LESS THAN 10% IN NEXT 10 YEARS: ICD-10-CM

## 2022-04-14 LAB
A/G RATIO: 2 (ref 1.1–2.2)
ALBUMIN SERPL-MCNC: 4.9 G/DL (ref 3.4–5)
ALP BLD-CCNC: 82 U/L (ref 40–129)
ALT SERPL-CCNC: 16 U/L (ref 10–40)
ANION GAP SERPL CALCULATED.3IONS-SCNC: 16 MMOL/L (ref 3–16)
AST SERPL-CCNC: 19 U/L (ref 15–37)
BASOPHILS ABSOLUTE: 0 K/UL (ref 0–0.2)
BASOPHILS RELATIVE PERCENT: 0.7 %
BILIRUB SERPL-MCNC: 0.4 MG/DL (ref 0–1)
BUN BLDV-MCNC: 14 MG/DL (ref 7–20)
CALCIUM SERPL-MCNC: 9.3 MG/DL (ref 8.3–10.6)
CHLORIDE BLD-SCNC: 104 MMOL/L (ref 99–110)
CHOLESTEROL, TOTAL: 154 MG/DL (ref 0–199)
CO2: 23 MMOL/L (ref 21–32)
CREAT SERPL-MCNC: 1 MG/DL (ref 0.6–1.2)
EOSINOPHILS ABSOLUTE: 0.1 K/UL (ref 0–0.6)
EOSINOPHILS RELATIVE PERCENT: 1.9 %
GFR AFRICAN AMERICAN: >60
GFR NON-AFRICAN AMERICAN: 55
GLUCOSE BLD-MCNC: 106 MG/DL (ref 70–99)
HCT VFR BLD CALC: 40.9 % (ref 36–48)
HDLC SERPL-MCNC: 61 MG/DL (ref 40–60)
HEMOGLOBIN: 13.3 G/DL (ref 12–16)
LDL CHOLESTEROL CALCULATED: 71 MG/DL
LYMPHOCYTES ABSOLUTE: 1.4 K/UL (ref 1–5.1)
LYMPHOCYTES RELATIVE PERCENT: 31.9 %
MCH RBC QN AUTO: 28.6 PG (ref 26–34)
MCHC RBC AUTO-ENTMCNC: 32.5 G/DL (ref 31–36)
MCV RBC AUTO: 88.1 FL (ref 80–100)
MONOCYTES ABSOLUTE: 0.3 K/UL (ref 0–1.3)
MONOCYTES RELATIVE PERCENT: 7.7 %
NEUTROPHILS ABSOLUTE: 2.6 K/UL (ref 1.7–7.7)
NEUTROPHILS RELATIVE PERCENT: 57.8 %
PDW BLD-RTO: 13.6 % (ref 12.4–15.4)
PLATELET # BLD: 261 K/UL (ref 135–450)
PMV BLD AUTO: 7.8 FL (ref 5–10.5)
POTASSIUM SERPL-SCNC: 4.8 MMOL/L (ref 3.5–5.1)
RBC # BLD: 4.64 M/UL (ref 4–5.2)
SODIUM BLD-SCNC: 143 MMOL/L (ref 136–145)
TOTAL PROTEIN: 7.4 G/DL (ref 6.4–8.2)
TRIGL SERPL-MCNC: 109 MG/DL (ref 0–150)
VLDLC SERPL CALC-MCNC: 22 MG/DL
WBC # BLD: 4.4 K/UL (ref 4–11)

## 2022-04-14 PROCEDURE — 77063 BREAST TOMOSYNTHESIS BI: CPT

## 2022-04-14 PROCEDURE — 1123F ACP DISCUSS/DSCN MKR DOCD: CPT | Performed by: INTERNAL MEDICINE

## 2022-04-14 PROCEDURE — G8417 CALC BMI ABV UP PARAM F/U: HCPCS | Performed by: INTERNAL MEDICINE

## 2022-04-14 PROCEDURE — 3017F COLORECTAL CA SCREEN DOC REV: CPT | Performed by: INTERNAL MEDICINE

## 2022-04-14 PROCEDURE — 4040F PNEUMOC VAC/ADMIN/RCVD: CPT | Performed by: INTERNAL MEDICINE

## 2022-04-14 PROCEDURE — 1036F TOBACCO NON-USER: CPT | Performed by: INTERNAL MEDICINE

## 2022-04-14 PROCEDURE — G8427 DOCREV CUR MEDS BY ELIG CLIN: HCPCS | Performed by: INTERNAL MEDICINE

## 2022-04-14 PROCEDURE — G8399 PT W/DXA RESULTS DOCUMENT: HCPCS | Performed by: INTERNAL MEDICINE

## 2022-04-14 PROCEDURE — 99213 OFFICE O/P EST LOW 20 MIN: CPT | Performed by: INTERNAL MEDICINE

## 2022-04-14 PROCEDURE — 36415 COLL VENOUS BLD VENIPUNCTURE: CPT | Performed by: INTERNAL MEDICINE

## 2022-04-14 PROCEDURE — 1090F PRES/ABSN URINE INCON ASSESS: CPT | Performed by: INTERNAL MEDICINE

## 2022-04-14 RX ORDER — ROSUVASTATIN CALCIUM 5 MG/1
5 TABLET, COATED ORAL NIGHTLY
Qty: 90 TABLET | Refills: 3 | Status: SHIPPED | OUTPATIENT
Start: 2022-04-14

## 2022-04-14 ASSESSMENT — LIFESTYLE VARIABLES
HOW OFTEN DO YOU HAVE A DRINK CONTAINING ALCOHOL: MONTHLY OR LESS
HOW MANY STANDARD DRINKS CONTAINING ALCOHOL DO YOU HAVE ON A TYPICAL DAY: 1 OR 2

## 2022-04-14 ASSESSMENT — PATIENT HEALTH QUESTIONNAIRE - PHQ9
SUM OF ALL RESPONSES TO PHQ QUESTIONS 1-9: 0
SUM OF ALL RESPONSES TO PHQ9 QUESTIONS 1 & 2: 0
2. FEELING DOWN, DEPRESSED OR HOPELESS: 0
SUM OF ALL RESPONSES TO PHQ QUESTIONS 1-9: 0
SUM OF ALL RESPONSES TO PHQ QUESTIONS 1-9: 0
1. LITTLE INTEREST OR PLEASURE IN DOING THINGS: 0
SUM OF ALL RESPONSES TO PHQ QUESTIONS 1-9: 0

## 2022-04-14 NOTE — PROGRESS NOTES
Medicare Annual Wellness Visit    Freddy Davidson is here for Medicare AWV and Hyperlipidemia    Assessment & Plan   Initial Medicare annual wellness visit  -     Full code  Encounter for screening mammogram for breast cancer  -     SUNITHA Digital Screen Bilateral [[id number]]; Future      Recommendations for Preventive Services Due: see orders and patient instructions/AVS.  Recommended screening schedule for the next 5-10 years is provided to the patient in written form: see Patient Instructions/AVS.     No follow-ups on file. Subjective       Patient's complete Health Risk Assessment and screening values have been reviewed and are found in Flowsheets. The following problems were reviewed today and where indicated follow up appointments were made and/or referrals ordered. Positive Risk Factor Screenings with Interventions:              Health Habits/Nutrition:     Physical Activity: Inactive    Days of Exercise per Week: 0 days    Minutes of Exercise per Session: 0 min     Have you lost any weight without trying in the past 3 months?: No  Body mass index: (!) 32.73  Have you seen the dentist within the past year?: Yes    Health Habits/Nutrition Interventions:  · Inadequate physical activity:  patient agrees to exercise for at least 150 minutes/week    Hearing/Vision:  Do you or your family notice any trouble with your hearing that hasn't been managed with hearing aids?: No  Do you have difficulty driving, watching TV, or doing any of your daily activities because of your eyesight?: No  Have you had an eye exam within the past year?: (!) No (needs to schedule)  No exam data present    Hearing/Vision Interventions:  · Vision concerns:  patient encouraged to make appointment with his/her eye specialist            Objective   Vitals:    04/14/22 1032   BP: 118/78   Pulse: 79   SpO2: 98%   Weight: 209 lb (94.8 kg)   Height: 5' 7\" (1.702 m)      Body mass index is 32.73 kg/m².                Allergies   Allergen Reactions    Cephalexin      Edema    Dye [Barium-Containing Compounds] Hives    Iodine      Rash    Lamisil [Terbinafine]      Itch and rash    Pcn [Penicillins]      Not sure     Prior to Visit Medications    Medication Sig Taking? Authorizing Provider   rosuvastatin (CRESTOR) 5 MG tablet Take 1 tablet by mouth nightly Yes Licha Hensley MD   aspirin EC 81 MG EC tablet Take 1 tablet by mouth daily Yes Licha Hensley MD   cetirizine (ZYRTEC) 10 MG tablet Take 10 mg by mouth daily Yes Historical Provider, MD   vitamin D (CHOLECALCIFEROL) 1000 UNIT TABS tablet Take 1,000 Units by mouth daily.    Yes Historical Provider, MD [place] (Including outside providers/suppliers regularly involved in providing care):   Patient Care Team:  Xenia aMta MD as PCP - Beverly Hensley MD as PCP - [facility] Empaneled Provider  Lina Weldon MD (Orthopedic Surgery)  Krish Stephens MD as Consulting Physician (Obstetrics & Gynecology)    Reviewed and updated this visit:  Tobacco  Allergies  Meds  Problems  Med Hx  Surg Hx  Soc Hx  Fam Hx

## 2022-04-14 NOTE — TELEPHONE ENCOUNTER
Inform patient she's not due for DEXA scan for another 1-2 years. She can get the 4 Covid 19 vaccine anytime available.

## 2022-04-14 NOTE — TELEPHONE ENCOUNTER
----- Message from Edna Gonsales sent at 4/14/2022 12:42 PM EDT -----  Subject: Message to Provider    QUESTIONS  Information for Provider? Patient would like a Dexa scan order placed in   [facility] so she can get her mammo goes as well. She wants to get these done   asap. She is also asking about the #4 covid booster and if she should get   it and if so when?  ---------------------------------------------------------------------------  --------------  CALL BACK INFO  What is the best way for the office to contact you? OK to leave message on   voicemail  Preferred Call Back Phone Number? [phone]  ---------------------------------------------------------------------------  --------------  SCRIPT ANSWERS  Relationship to Patient?  Self

## 2022-04-14 NOTE — PATIENT INSTRUCTIONS
Personalized Preventive Plan for Kalli Ribera - 4/14/2022  Medicare offers a range of preventive health benefits. Some of the tests and screenings are paid in full while other may be subject to a deductible, co-insurance, and/or copay. Some of these benefits include a comprehensive review of your medical history including lifestyle, illnesses that may run in your family, and various assessments and screenings as appropriate. After reviewing your medical record and screening and assessments performed today your provider may have ordered immunizations, labs, imaging, and/or referrals for you. A list of these orders (if applicable) as well as your Preventive Care list are included within your After Visit Summary for your review. Other Preventive Recommendations:    · A preventive eye exam performed by an eye specialist is recommended every 1-2 years to screen for glaucoma; cataracts, macular degeneration, and other eye disorders. · A preventive dental visit is recommended every 6 months. · Try to get at least 150 minutes of exercise per week or 10,000 steps per day on a pedometer . · Order or download the FREE \"Exercise & Physical Activity: Your Everyday Guide\" from The [employer]. Call 4-924.441.6247 or search The [employer] online. · You need 7400-1891 mg of calcium and 6086-9274 IU of vitamin D per day. It is possible to meet your calcium requirement with diet alone, but a vitamin D supplement is usually necessary to meet this goal.  · When exposed to the sun, use a sunscreen that protects against both UVA and UVB radiation with an SPF of 30 or greater. Reapply every 2 to 3 hours or after sweating, drying off with a towel, or swimming. · Always wear a seat belt when traveling in a car. Always wear a helmet when riding a bicycle or motorcycle.

## 2022-04-14 NOTE — PROGRESS NOTES
Renee Galeano  YOB: 1955    Date of Service:  4/14/2022    Chief Complaint:      Chief Complaint   Patient presents with    Hyperlipidemia       Assessment/Plan:  Senait Fernandez was seen today for hyperlipidemia. Diagnoses and all orders for this visit:    Hypercholesteremia  -     Lipid Panel  -     Comprehensive Metabolic Panel  -     CBC with Auto Differential  -     rosuvastatin (CRESTOR) 5 MG tablet; Take 1 tablet by mouth nightly    Risk for coronary artery disease less than 10% in next 10 years  -     rosuvastatin (CRESTOR) 5 MG tablet; Take 1 tablet by mouth nightly    Encounter for screening mammogram for breast cancer  -     Cancel: SUNITHA Digital Screen Bilateral [[id number]]; Future  -     SUNITHA HELEN DIGITAL SCREEN BILATERAL; Future    Other orders  -     Full code    Stable and continue on current medications. Return April 17 at 10:20 Fasting Medicare Wellness and f/u. HPI:  Renee Galeano is a 78 y.o. Osteopenia with h/o Fracture left leg:  Mild bone loss on DEXA 2020, recommed pt take Calcium plus D along with weight bearing exercise. Hyperlipidemia:  No new myalgias or GI upset on rosuvastatin (Crestor) 5 mg qd. Medication compliance: compliant most of the time. Patient is  following a low fat, low cholesterol diet. She is  exercising regularly.    The 10-year ASCVD risk score (Cathy Syed, et al., 2013) is: 4.6%    Values used to calculate the score:      Age: 78 years      Sex: Female      Is Non- : No      Diabetic: No      Tobacco smoker: No      Systolic Blood Pressure: 000 mmHg      Is BP treated: No      HDL Cholesterol: 53 mg/dL      Total Cholesterol: 145 mg/dL    No results found for: LABA1C, LABMICR  Lab Results   Component Value Date     04/15/2021    K 4.5 04/15/2021     04/15/2021    CO2 25 04/15/2021    BUN 15 04/15/2021    CREATININE 0.9 04/15/2021    GLUCOSE 96 04/15/2021    CALCIUM 8.7 04/15/2021     Lab Results   Component Value Date    CHOL 145 10/12/2021    TRIG 115 10/12/2021    HDL 53 10/12/2021    LDLCALC 69 10/12/2021     Lab Results   Component Value Date    ALT 18 10/12/2021    AST 20 10/12/2021     No results found for: TSH, T4FREE, T3FREE  Lab Results   Component Value Date    WBC 5.1 04/15/2021    HGB 13.3 04/15/2021    HCT 40.5 04/15/2021    MCV 88.5 04/15/2021     04/15/2021     No results found for: INR   No results found for: PSA   No results found for: LABURIC     Patient Active Problem List   Diagnosis    Osteopenia with high risk of fracture    Chronic bilateral low back pain with right-sided sciatica    Hypercholesteremia    Risk for coronary artery disease less than 10% in next 10 years       Allergies   Allergen Reactions    Cephalexin      Edema    Dye [Barium-Containing Compounds] Hives    Iodine      Rash    Lamisil [Terbinafine]      Itch and rash    Pcn [Penicillins]      Not sure     Outpatient Medications Marked as Taking for the 4/14/22 encounter (Office Visit) with Monse Hensley MD   Medication Sig Dispense Refill    rosuvastatin (CRESTOR) 5 MG tablet Take 1 tablet by mouth nightly 30 tablet 11    aspirin EC 81 MG EC tablet Take 1 tablet by mouth daily 1 tablet 0    cetirizine (ZYRTEC) 10 MG tablet Take 10 mg by mouth daily      vitamin D (CHOLECALCIFEROL) 1000 UNIT TABS tablet Take 1,000 Units by mouth daily. Review of Systems: 14 systems were negative except of what was stated on HPI    Nursing note and vitals reviewed. Vitals:    04/14/22 1032   BP: 118/78   Pulse: 79   SpO2: 98%   Weight: 209 lb (94.8 kg)   Height: 5' 7\" (1.702 m)     Wt Readings from Last 3 Encounters:   04/14/22 209 lb (94.8 kg)   10/12/21 203 lb (92.1 kg)   05/06/21 207 lb (93.9 kg)     BP Readings from Last 3 Encounters:   04/14/22 118/78   10/12/21 130/80   05/06/21 117/74     Body mass index is 32.73 kg/m². Constitutional: Patient appears well-developed and well-nourished. No distress.    Head: Normocephalic and atraumatic. Neck: Normal range of motion. Neck supple. No thyroidmegaly. Cardiovascular: Normal rate, regular rhythm, normal heart sounds and intact distal pulses. Pulmonary/Chest: Effort normal and breath sounds normal. No stridor. No respiratory distress. No wheezes and no rales. Abdominal: Soft. Bowel sounds are normal. No distension and no mass. No tenderness. No rebound and no guarding. Musculoskeletal: No edema and no tenderness. Skin: No rash or erythema.

## 2022-04-18 ENCOUNTER — TELEMEDICINE (OUTPATIENT)
Dept: INTERNAL MEDICINE CLINIC | Age: 67
End: 2022-04-18
Payer: MEDICARE

## 2022-04-18 DIAGNOSIS — Z00.00 INITIAL MEDICARE ANNUAL WELLNESS VISIT: Primary | ICD-10-CM

## 2022-04-18 PROCEDURE — 1123F ACP DISCUSS/DSCN MKR DOCD: CPT | Performed by: INTERNAL MEDICINE

## 2022-04-18 PROCEDURE — 4040F PNEUMOC VAC/ADMIN/RCVD: CPT | Performed by: INTERNAL MEDICINE

## 2022-04-18 PROCEDURE — 3017F COLORECTAL CA SCREEN DOC REV: CPT | Performed by: INTERNAL MEDICINE

## 2022-04-18 PROCEDURE — G0438 PPPS, INITIAL VISIT: HCPCS | Performed by: INTERNAL MEDICINE

## 2022-04-18 ASSESSMENT — PATIENT HEALTH QUESTIONNAIRE - PHQ9
SUM OF ALL RESPONSES TO PHQ QUESTIONS 1-9: 0
SUM OF ALL RESPONSES TO PHQ QUESTIONS 1-9: 0
SUM OF ALL RESPONSES TO PHQ9 QUESTIONS 1 & 2: 0
2. FEELING DOWN, DEPRESSED OR HOPELESS: 0
SUM OF ALL RESPONSES TO PHQ QUESTIONS 1-9: 0
SUM OF ALL RESPONSES TO PHQ QUESTIONS 1-9: 0
1. LITTLE INTEREST OR PLEASURE IN DOING THINGS: 0

## 2022-04-19 NOTE — PROGRESS NOTES
Medicare Annual Wellness Visit    Kailee Mcdowell is here for Medicare AWV    Assessment & Plan   Initial Medicare annual wellness visit      Recommendations for Preventive Services Due: see orders and patient instructions/AVS.  Recommended screening schedule for the next 5-10 years is provided to the patient in written form: see Patient Instructions/AVS.     Return for Medicare Annual Wellness Visit in 1 year. Subjective       Patient's complete Health Risk Assessment and screening values have been reviewed and are found in Flowsheets. The following problems were reviewed today and where indicated follow up appointments were made and/or referrals ordered. Positive Risk Factor Screenings with Interventions:              Health Habits/Nutrition:     Physical Activity: Inactive    Days of Exercise per Week: 0 days    Minutes of Exercise per Session: 0 min     Have you lost any weight without trying in the past 3 months?: No     Have you seen the dentist within the past year?: Yes    Health Habits/Nutrition Interventions:  · Inadequate physical activity:  patient agrees to exercise for at least 150 minutes/week    Hearing/Vision:  Do you or your family notice any trouble with your hearing that hasn't been managed with hearing aids?: No  Do you have difficulty driving, watching TV, or doing any of your daily activities because of your eyesight?: No  Have you had an eye exam within the past year?: (!) No (needs to schedule)  No exam data present    Hearing/Vision Interventions:  · Vision concerns:  patient encouraged to make appointment with his/her eye specialist            Objective      Patient-Reported Vitals  No data recorded            Allergies   Allergen Reactions    Cephalexin      Edema    Dye [Barium-Containing Compounds] Hives    Iodine      Rash    Lamisil [Terbinafine]      Itch and rash    Pcn [Penicillins]      Not sure     Prior to Visit Medications    Medication Sig Taking?  Authorizing Provider   rosuvastatin (CRESTOR) 5 MG tablet Take 1 tablet by mouth nightly Yes Licha Hensley MD   aspirin EC 81 MG EC tablet Take 1 tablet by mouth daily Yes Licha Hensley MD   cetirizine (ZYRTEC) 10 MG tablet Take 10 mg by mouth daily Yes Historical Provider, MD   vitamin D (CHOLECALCIFEROL) 1000 UNIT TABS tablet Take 1,000 Units by mouth daily. Yes Historical Provider, MD [place] (Including outside providers/suppliers regularly involved in providing care):   Patient Care Team:  Lizzie Benitez MD as PCP - Jordan Hensley MD as PCP - [facility] Empaneled Provider  Jacqueline Amor MD (Orthopedic Surgery)  Paige Leger MD as Consulting Physician (Obstetrics & Gynecology)    Reviewed and updated this visit:  Allergies  Meds            Dunia Deleon, was evaluated through a synchronous (real-time) audio-video encounter. The patient (or guardian if applicable) is aware that this is a billable service, which includes applicable co-pays. This Virtual Visit was conducted with patient's (and/or legal guardian's) consent. The visit was conducted pursuant to the emergency declaration under the [facility], [address] [place] [address] waiver authority and the [employer] General Act. Patient identification was verified, and a caregiver was present when appropriate. The patient was located at home in a state where the provider was licensed to provide care.

## 2022-04-19 NOTE — PATIENT INSTRUCTIONS
Personalized Preventive Plan for Soraya Alberts - 4/18/2022  Medicare offers a range of preventive health benefits. Some of the tests and screenings are paid in full while other may be subject to a deductible, co-insurance, and/or copay. Some of these benefits include a comprehensive review of your medical history including lifestyle, illnesses that may run in your family, and various assessments and screenings as appropriate. After reviewing your medical record and screening and assessments performed today your provider may have ordered immunizations, labs, imaging, and/or referrals for you. A list of these orders (if applicable) as well as your Preventive Care list are included within your After Visit Summary for your review. Other Preventive Recommendations:    · A preventive eye exam performed by an eye specialist is recommended every 1-2 years to screen for glaucoma; cataracts, macular degeneration, and other eye disorders. · A preventive dental visit is recommended every 6 months. · Try to get at least 150 minutes of exercise per week or 10,000 steps per day on a pedometer . · Order or download the FREE \"Exercise & Physical Activity: Your Everyday Guide\" from [employer]. Call 7-692.998.3976 or search [employer] online. · You need 3348-9637 mg of calcium and 1511-1431 IU of vitamin D per day. It is possible to meet your calcium requirement with diet alone, but a vitamin D supplement is usually necessary to meet this goal.  · When exposed to the sun, use a sunscreen that protects against both UVA and UVB radiation with an SPF of 30 or greater. Reapply every 2 to 3 hours or after sweating, drying off with a towel, or swimming. · Always wear a seat belt when traveling in a car. Always wear a helmet when riding a bicycle or motorcycle.

## 2022-04-21 DIAGNOSIS — Z91.89 RISK FOR CORONARY ARTERY DISEASE LESS THAN 10% IN NEXT 10 YEARS: ICD-10-CM

## 2022-04-21 DIAGNOSIS — E78.00 HYPERCHOLESTEREMIA: ICD-10-CM

## 2022-04-21 RX ORDER — ROSUVASTATIN CALCIUM 5 MG/1
5 TABLET, COATED ORAL NIGHTLY
Qty: 60 TABLET | Refills: 0 | OUTPATIENT
Start: 2022-04-21

## 2022-04-23 DIAGNOSIS — Z91.89 RISK FOR CORONARY ARTERY DISEASE LESS THAN 10% IN NEXT 10 YEARS: ICD-10-CM

## 2022-04-23 DIAGNOSIS — E78.00 HYPERCHOLESTEREMIA: ICD-10-CM

## 2022-04-25 RX ORDER — ROSUVASTATIN CALCIUM 5 MG/1
5 TABLET, COATED ORAL NIGHTLY
Qty: 60 TABLET | Refills: 0 | OUTPATIENT
Start: 2022-04-25

## 2022-08-11 PROCEDURE — 99283 EMERGENCY DEPT VISIT LOW MDM: CPT

## 2022-08-12 ENCOUNTER — HOSPITAL ENCOUNTER (EMERGENCY)
Age: 67
Discharge: HOME | End: 2022-08-12
Payer: MEDICARE

## 2022-08-12 ENCOUNTER — APPOINTMENT (OUTPATIENT)
Dept: GENERAL RADIOLOGY | Age: 67
End: 2022-08-12
Payer: MEDICARE

## 2022-08-12 VITALS
HEART RATE: 83 BPM | BODY MASS INDEX: 35.44 KG/M2 | RESPIRATION RATE: 15 BRPM | DIASTOLIC BLOOD PRESSURE: 69 MMHG | WEIGHT: 200 LBS | HEIGHT: 63 IN | TEMPERATURE: 98.2 F | SYSTOLIC BLOOD PRESSURE: 133 MMHG | OXYGEN SATURATION: 97 %
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
## 2022-08-12 DIAGNOSIS — S93.401A SPRAIN OF RIGHT ANKLE, UNSPECIFIED LIGAMENT, INITIAL ENCOUNTER: Primary | ICD-10-CM

## 2022-08-12 DIAGNOSIS — M25.471 PAIN AND SWELLING OF RIGHT ANKLE: ICD-10-CM

## 2022-08-12 DIAGNOSIS — M25.571 PAIN AND SWELLING OF RIGHT ANKLE: ICD-10-CM

## 2022-08-12 PROCEDURE — 73630 X-RAY EXAM OF FOOT: CPT

## 2022-08-12 PROCEDURE — 73610 X-RAY EXAM OF ANKLE: CPT

## 2022-08-12 PROCEDURE — 6370000000 HC RX 637 (ALT 250 FOR IP): Performed by: NURSE PRACTITIONER

## 2022-08-12 RX ORDER — HYDROCODONE BITARTRATE AND ACETAMINOPHEN 5; 325 MG/1; MG/1
1 TABLET ORAL EVERY 6 HOURS PRN
Qty: 12 TABLET | Refills: 0 | Status: SHIPPED | OUTPATIENT
Start: 2022-08-12 | End: 2022-08-15

## 2022-08-12 RX ORDER — HYDROCODONE BITARTRATE AND ACETAMINOPHEN 5; 325 MG/1; MG/1
1 TABLET ORAL ONCE
Status: COMPLETED | OUTPATIENT
Start: 2022-08-12 | End: 2022-08-12

## 2022-08-12 RX ORDER — IBUPROFEN 800 MG/1
800 TABLET ORAL ONCE
Status: COMPLETED | OUTPATIENT
Start: 2022-08-12 | End: 2022-08-12

## 2022-08-12 RX ADMIN — IBUPROFEN 800 MG: 800 TABLET, FILM COATED ORAL at 01:45

## 2022-08-12 RX ADMIN — HYDROCODONE BITARTRATE AND ACETAMINOPHEN 1 TABLET: 5; 325 TABLET ORAL at 01:45

## 2022-08-12 ASSESSMENT — PAIN SCALES - GENERAL
PAINLEVEL_OUTOF10: 10
PAINLEVEL_OUTOF10: 10

## 2022-08-12 ASSESSMENT — PAIN - FUNCTIONAL ASSESSMENT: PAIN_FUNCTIONAL_ASSESSMENT: 0-10

## 2022-08-12 ASSESSMENT — PAIN DESCRIPTION - ORIENTATION: ORIENTATION: RIGHT

## 2022-08-12 ASSESSMENT — PAIN DESCRIPTION - LOCATION: LOCATION: ANKLE

## 2022-08-12 ASSESSMENT — PAIN DESCRIPTION - DESCRIPTORS: DESCRIPTORS: ACHING

## 2022-08-12 NOTE — ED PROVIDER NOTES
Evaluated by Advanced Practice Provider    [address] [facility]  ED    CHIEF COMPLAINT  Foot Injury (Twisted foot, right ankle)    HISTORY OF PRESENT ILLNESS  Jaylon Cardona is a 78 y.o. female who presents to the ED complaining of right ankle pain. She twisted her right foot/ankle earlier tonight. She was walking outside and thinks maybe stepped in a hole. Reports she has tried to walk on it but it is to painful. No numbness or tingling into the right foot ot toes. No pain up into the leg or knee. The patient is currently rating their pain as 10/10 and describes it as an aching type of pain. Treatments tried prior to arrival in the ED: none. The patient arrived to the ED via EMS transport. Nursing notes reviewed.    Past Medical History:   Diagnosis Date    Chronic bilateral low back pain with right-sided sciatica     DJD (degenerative joint disease) of knee     GERD (gastroesophageal reflux disease)     HIV-1 infection ([facility] [address] 02/06/2019    + Ag/Ab, HIV 1 antibody reactive    Hypercholesteremia 4/15/2021    Osteopenia     Postmenopausal HRT (hormone replacement therapy)      Past Surgical History:   Procedure Laterality Date    COLONOSCOPY  1/8/2008    Dr. Leandra Grimaldo     Family History   Problem Relation Age of Onset    Breast Cancer Other     High Cholesterol Brother     Stroke Brother     Other Brother         Carotid Stenosis    High Blood Pressure Brother     High Cholesterol Sister     Asthma Sister     Alcohol Abuse Sister     Lung Cancer Father     Depression Father         Listed with Anxiety    Alcohol Abuse Father     Depression Mother         Listed with Anxiety    Breast Cancer Mother         Late 52's    Alcohol Abuse Mother     High Cholesterol Maternal Grandmother     High Blood Pressure Maternal Grandmother     Heart Disease Maternal Grandmother     High Cholesterol Maternal Grandfather     Heart Disease Maternal Grandfather     High Cholesterol Paternal Grandmother     Heart Disease Paternal Grandmother         Carotid Artery Stenosis    COPD Paternal Grandmother     Stroke Paternal Grandmother     High Cholesterol Paternal Grandfather     Heart Disease Paternal Grandfather     Thyroid Disease Other         Cousin    Stroke Maternal Uncle      Social History     Socioeconomic History    Marital status:      Spouse name: Not on file    Number of children: Not on file    Years of education: Not on file    Highest education level: Not on file   Occupational History    Not on file   Tobacco Use    Smoking status: Never    Smokeless tobacco: Never   Substance and Sexual Activity    Alcohol use: Yes     Alcohol/week: 0.0 standard drinks     Comment: rarely    Drug use: No    Sexual activity: Yes     Partners: Male   Other Topics Concern    Not on file   Social History Narrative    Not on file     Social Determinants of Health     Financial Resource Strain: Not on file   Food Insecurity: Not on file   Transportation Needs: Not on file   Physical Activity: Inactive    Days of Exercise per Week: 0 days    Minutes of Exercise per Session: 0 min   Stress: Not on file   Social Connections: Not on file   Intimate Partner Violence: Not on file   Housing Stability: Not on file     No current facility-administered medications for this encounter. Current Outpatient Medications   Medication Sig Dispense Refill    HYDROcodone-acetaminophen (NORCO) 5-325 MG per tablet Take 1 tablet by mouth every 6 hours as needed for Pain for up to 3 days. Intended supply: 3 days. Take lowest dose possible to manage pain 12 tablet 0    rosuvastatin (CRESTOR) 5 MG tablet Take 1 tablet by mouth nightly 90 tablet 3    aspirin EC 81 MG EC tablet Take 1 tablet by mouth daily 1 tablet 0    cetirizine (ZYRTEC) 10 MG tablet Take 10 mg by mouth daily      vitamin D (CHOLECALCIFEROL) 1000 UNIT TABS tablet Take 1,000 Units by mouth daily.          Allergies   Allergen Reactions    Cephalexin      Edema    Dye [Barium-Containing Compounds] Hives    Iodine      Rash    Lamisil [Terbinafine]      Itch and rash    Pcn [Penicillins]      Not sure       REVIEW OF SYSTEMS    10 systems reviewed, pertinent positives per HPI otherwise noted to be negative    PHYSICAL EXAM  /69   Pulse 83   Temp 98.2 °F (36.8 °C) (Oral)   Resp 15   Ht 5' 3\" (1.6 m)   Wt 200 lb (90.7 kg)   SpO2 97%   BMI 35.43 kg/m²   GENERAL APPEARANCE: Awake and alert. Cooperative. No apparent distress. HEAD: Normocephalic, atraumatic. Conjunctiva appear normal. Sclera is non-icteric. External ears are normal.  Mucous membranes moist.  EYES: EOM's grossly intact. ENT: Mucous membranes are moist.   NECK: Supple with normal ROM. Trachea midline  CARDIOVASCULAR:  Regular rate and rhythm. Brisk capillary refill. LUNGS: Equal symmetric chest rise. Breathing is unlabored. Speaking comfortably in full sentences. Abdomen: Non-distended. EXTREMITIES: Right ankle exam: mild swelling along the lateral aspect of the right ankle, generalized tenderness to palpation of the ankle. There is no knee tenderness or fibular head tenderness. ROM to the right ankle not evaluated due to pain. Distal pulses palpable. Capillary refill less than 3 seconds. Full sensation is intact. SKIN: Warm and dry. Skin is intact, no open wounds observed. NEUROLOGICAL: Alert and oriented. Sensation is intact to light touch to the distal tips of the toes on the right foot. PSYCHIATRIC: Mood and affect appropriate. Speech is clear and articulate. LABS  No results found for this visit on 08/12/22. RADIOLOGY  XR ANKLE RIGHT (MIN 3 VIEWS)    Result Date: 8/12/2022  EXAMINATION: THREE XRAY VIEWS OF THE RIGHT ANKLE;  3 XRAY VIEWS OF THE RIGHT FOOT 8/12/2022 12:27 am COMPARISON: None.  HISTORY: ORDERING SYSTEM PROVIDED HISTORY: pain due to fall TECHNOLOGIST PROVIDED HISTORY: Reason for exam:->pain due to fall Reason for Exam: twisted rt foot   no swelling; ORDERING SYSTEM PROVIDED HISTORY: pain due to fall TECHNOLOGIST PROVIDED HISTORY: Reason for exam:->pain due to fall Reason for Exam: twisted rt foot  no swelling FINDINGS: There is minimal lateral right ankle soft tissue swelling. There is no evidence of an acute fracture or dislocation of the right ankle or foot. There are no significant degenerative changes. Ankle mortise is symmetric. No evidence of an acute fracture of the right ankle or foot. XR FOOT RIGHT (MIN 3 VIEWS)    Result Date: 8/12/2022  EXAMINATION: THREE XRAY VIEWS OF THE RIGHT ANKLE;  3 XRAY VIEWS OF THE RIGHT FOOT 8/12/2022 12:27 am COMPARISON: None. HISTORY: ORDERING SYSTEM PROVIDED HISTORY: pain due to fall TECHNOLOGIST PROVIDED HISTORY: Reason for exam:->pain due to fall Reason for Exam: twisted rt foot   no swelling; ORDERING SYSTEM PROVIDED HISTORY: pain due to fall TECHNOLOGIST PROVIDED HISTORY: Reason for exam:->pain due to fall Reason for Exam: twisted rt foot  no swelling FINDINGS: There is minimal lateral right ankle soft tissue swelling. There is no evidence of an acute fracture or dislocation of the right ankle or foot. There are no significant degenerative changes. Ankle mortise is symmetric. No evidence of an acute fracture of the right ankle or foot. ED COURSE/MDM  Patient seen and evaluated. Old records reviewed. Diagnostic testing reviewed and results discussed. I have evaluated this patient. My supervising physician was available for consultation. Ant Larson presented to the ED today with above noted complaints. Patient appears well, vital signs are normal. Right ankle physical exam shows swelling and tenderness to the lateral ankle. X-ray obtained and shows no acute findings in the ankle or foot. I feel patient's symptoms are most consistent with an ankle sprain. Under my direction a air cast and ace wrap was placed on the right leg by the ED tech. I have examined the splint thoroughly for functionality.  Extremity

## 2022-09-26 ENCOUNTER — HOSPITAL ENCOUNTER (OUTPATIENT)
Dept: WOMENS IMAGING | Age: 67
Discharge: HOME | End: 2022-09-26
Payer: MEDICARE

## 2022-09-26 ENCOUNTER — TELEPHONE (OUTPATIENT)
Dept: INTERNAL MEDICINE CLINIC | Age: 67
End: 2022-09-26

## 2022-09-26 DIAGNOSIS — Z13.820 ENCOUNTER FOR SCREENING FOR OSTEOPOROSIS: Primary | ICD-10-CM

## 2022-09-26 DIAGNOSIS — Z13.820 ENCOUNTER FOR SCREENING FOR OSTEOPOROSIS: ICD-10-CM

## 2022-09-26 PROCEDURE — 77080 DXA BONE DENSITY AXIAL: CPT

## 2022-09-26 NOTE — TELEPHONE ENCOUNTER
[facility] called asking for order for Dexa scan to be placed. Patient has appointment today.      Please advise

## 2023-04-17 ENCOUNTER — OFFICE VISIT (OUTPATIENT)
Dept: INTERNAL MEDICINE CLINIC | Age: 68
End: 2023-04-17
Payer: MEDICARE

## 2023-04-17 ENCOUNTER — HOSPITAL ENCOUNTER (OUTPATIENT)
Dept: WOMENS IMAGING | Age: 68
Discharge: HOME | End: 2023-04-17
Payer: MEDICARE

## 2023-04-17 VITALS
OXYGEN SATURATION: 98 % | SYSTOLIC BLOOD PRESSURE: 128 MMHG | HEART RATE: 82 BPM | BODY MASS INDEX: 35.44 KG/M2 | WEIGHT: 200 LBS | DIASTOLIC BLOOD PRESSURE: 66 MMHG | HEIGHT: 63 IN

## 2023-04-17 VITALS — WEIGHT: 200 LBS | BODY MASS INDEX: 30.31 KG/M2 | HEIGHT: 68 IN

## 2023-04-17 DIAGNOSIS — M85.80 OSTEOPENIA WITH HIGH RISK OF FRACTURE: ICD-10-CM

## 2023-04-17 DIAGNOSIS — E66.01 SEVERE OBESITY (BMI 35.0-39.9) WITH COMORBIDITY (HCC): ICD-10-CM

## 2023-04-17 DIAGNOSIS — Z91.89 RISK FOR CORONARY ARTERY DISEASE LESS THAN 10% IN NEXT 10 YEARS: ICD-10-CM

## 2023-04-17 DIAGNOSIS — E78.00 HYPERCHOLESTEREMIA: Primary | ICD-10-CM

## 2023-04-17 DIAGNOSIS — Z12.31 VISIT FOR SCREENING MAMMOGRAM: ICD-10-CM

## 2023-04-17 PROCEDURE — 1123F ACP DISCUSS/DSCN MKR DOCD: CPT | Performed by: INTERNAL MEDICINE

## 2023-04-17 PROCEDURE — 1090F PRES/ABSN URINE INCON ASSESS: CPT | Performed by: INTERNAL MEDICINE

## 2023-04-17 PROCEDURE — G8427 DOCREV CUR MEDS BY ELIG CLIN: HCPCS | Performed by: INTERNAL MEDICINE

## 2023-04-17 PROCEDURE — G8417 CALC BMI ABV UP PARAM F/U: HCPCS | Performed by: INTERNAL MEDICINE

## 2023-04-17 PROCEDURE — 3017F COLORECTAL CA SCREEN DOC REV: CPT | Performed by: INTERNAL MEDICINE

## 2023-04-17 PROCEDURE — 1036F TOBACCO NON-USER: CPT | Performed by: INTERNAL MEDICINE

## 2023-04-17 PROCEDURE — G8399 PT W/DXA RESULTS DOCUMENT: HCPCS | Performed by: INTERNAL MEDICINE

## 2023-04-17 PROCEDURE — 99213 OFFICE O/P EST LOW 20 MIN: CPT | Performed by: INTERNAL MEDICINE

## 2023-04-17 PROCEDURE — 36415 COLL VENOUS BLD VENIPUNCTURE: CPT | Performed by: INTERNAL MEDICINE

## 2023-04-17 PROCEDURE — 77063 BREAST TOMOSYNTHESIS BI: CPT

## 2023-04-17 RX ORDER — ROSUVASTATIN CALCIUM 5 MG/1
5 TABLET, COATED ORAL NIGHTLY
Qty: 90 TABLET | Refills: 3 | Status: SHIPPED | OUTPATIENT
Start: 2023-04-17

## 2023-04-17 SDOH — ECONOMIC STABILITY: FOOD INSECURITY: WITHIN THE PAST 12 MONTHS, YOU WORRIED THAT YOUR FOOD WOULD RUN OUT BEFORE YOU GOT MONEY TO BUY MORE.: NEVER TRUE

## 2023-04-17 SDOH — ECONOMIC STABILITY: INCOME INSECURITY: HOW HARD IS IT FOR YOU TO PAY FOR THE VERY BASICS LIKE FOOD, HOUSING, MEDICAL CARE, AND HEATING?: NOT HARD AT ALL

## 2023-04-17 SDOH — ECONOMIC STABILITY: HOUSING INSECURITY
IN THE LAST 12 MONTHS, WAS THERE A TIME WHEN YOU DID NOT HAVE A STEADY PLACE TO SLEEP OR SLEPT IN A SHELTER (INCLUDING NOW)?: NO

## 2023-04-17 SDOH — ECONOMIC STABILITY: FOOD INSECURITY: WITHIN THE PAST 12 MONTHS, THE FOOD YOU BOUGHT JUST DIDN'T LAST AND YOU DIDN'T HAVE MONEY TO GET MORE.: NEVER TRUE

## 2023-04-17 ASSESSMENT — PATIENT HEALTH QUESTIONNAIRE - PHQ9
SUM OF ALL RESPONSES TO PHQ9 QUESTIONS 1 & 2: 0
SUM OF ALL RESPONSES TO PHQ QUESTIONS 1-9: 0
1. LITTLE INTEREST OR PLEASURE IN DOING THINGS: 0
2. FEELING DOWN, DEPRESSED OR HOPELESS: 0

## 2023-04-17 ASSESSMENT — LIFESTYLE VARIABLES
HOW OFTEN DO YOU HAVE A DRINK CONTAINING ALCOHOL: NEVER
HOW MANY STANDARD DRINKS CONTAINING ALCOHOL DO YOU HAVE ON A TYPICAL DAY: PATIENT DOES NOT DRINK

## 2023-04-17 NOTE — PROGRESS NOTES
Kun Solomon  YOB: 1955    Date of Service:  4/17/2023    Chief Complaint:      Chief Complaint   Patient presents with    Medicare AWV    Hyperlipidemia       Assessment/Plan:  Hiramlashae Garcia was seen today for medicare awv and hyperlipidemia. Diagnoses and all orders for this visit:    Hypercholesteremia  -     Lipid Panel  -     Comprehensive Metabolic Panel  -     CBC with Auto Differential  -     rosuvastatin (CRESTOR) 5 MG tablet; Take 1 tablet by mouth nightly    Risk for coronary artery disease less than 10% in next 10 years  -     rosuvastatin (CRESTOR) 5 MG tablet; Take 1 tablet by mouth nightly    Osteopenia with high risk of fracture  -     CBC with Auto Differential    Severe obesity (BMI 35.0-39. 9) with comorbidity ([address]    Stable and continue on current medications. Return VV AWV only 4/18 and Fasting AWV 4/18. HPI:  Kun Solomon is a 80 y.o. Osteopenia with h/o Fracture left leg:  Mild bone loss on DEXA 2020, recommed pt take Calcium plus D along with weight bearing exercise. Hyperlipidemia:  No new myalgias or GI upset on rosuvastatin (Crestor) 5 mg qd. Medication compliance: compliant most of the time. Patient is  following a low fat, low cholesterol diet. She is  exercising regularly.    The 10-year ASCVD risk score (Uma TOMLIN, et al., 2019) is: 6%    Values used to calculate the score:      Age: 80 years      Sex: Female      Is Non- : No      Diabetic: No      Tobacco smoker: No      Systolic Blood Pressure: 279 mmHg      Is BP treated: No      HDL Cholesterol: 61 mg/dL      Total Cholesterol: 154 mg/dL    No results found for: LABA1C, LABMICR  Lab Results   Component Value Date     04/14/2022    K 4.8 04/14/2022     04/14/2022    CO2 23 04/14/2022    BUN 14 04/14/2022    CREATININE 1.0 04/14/2022    GLUCOSE 106 (H) 04/14/2022    CALCIUM 9.3 04/14/2022     Lab Results   Component Value Date/Time    CHOL 154 04/14/2022 10:50 AM

## 2023-04-18 ENCOUNTER — TELEMEDICINE (OUTPATIENT)
Dept: INTERNAL MEDICINE CLINIC | Age: 68
End: 2023-04-18

## 2023-04-18 DIAGNOSIS — Z00.00 MEDICARE ANNUAL WELLNESS VISIT, SUBSEQUENT: Primary | ICD-10-CM

## 2023-04-18 LAB
ALBUMIN SERPL-MCNC: 4.7 G/DL (ref 3.4–5)
ALBUMIN/GLOB SERPL: 1.6 {RATIO} (ref 1.1–2.2)
ALP SERPL-CCNC: 95 U/L (ref 40–129)
ALT SERPL-CCNC: 25 U/L (ref 10–40)
ANION GAP SERPL CALCULATED.3IONS-SCNC: 16 MMOL/L (ref 3–16)
AST SERPL-CCNC: 29 U/L (ref 15–37)
BASOPHILS # BLD: 0.1 K/UL (ref 0–0.2)
BASOPHILS NFR BLD: 1.2 %
BILIRUB SERPL-MCNC: 0.3 MG/DL (ref 0–1)
BUN SERPL-MCNC: 16 MG/DL (ref 7–20)
CALCIUM SERPL-MCNC: 9.7 MG/DL (ref 8.3–10.6)
CHLORIDE SERPL-SCNC: 103 MMOL/L (ref 99–110)
CHOLEST SERPL-MCNC: 165 MG/DL (ref 0–199)
CO2 SERPL-SCNC: 22 MMOL/L (ref 21–32)
CREAT SERPL-MCNC: 1.1 MG/DL (ref 0.6–1.2)
DEPRECATED RDW RBC AUTO: 14.2 % (ref 12.4–15.4)
EOSINOPHIL # BLD: 0.1 K/UL (ref 0–0.6)
EOSINOPHIL NFR BLD: 2.9 %
GFR SERPLBLD CREATININE-BSD FMLA CKD-EPI: 55 ML/MIN/{1.73_M2}
GLUCOSE SERPL-MCNC: 112 MG/DL (ref 70–99)
HCT VFR BLD AUTO: 43.1 % (ref 36–48)
HDLC SERPL-MCNC: 63 MG/DL (ref 40–60)
HGB BLD-MCNC: 13.8 G/DL (ref 12–16)
LDLC SERPL CALC-MCNC: 83 MG/DL
LYMPHOCYTES # BLD: 1.3 K/UL (ref 1–5.1)
LYMPHOCYTES NFR BLD: 27.7 %
MCH RBC QN AUTO: 28.7 PG (ref 26–34)
MCHC RBC AUTO-ENTMCNC: 32 G/DL (ref 31–36)
MCV RBC AUTO: 89.6 FL (ref 80–100)
MONOCYTES # BLD: 0.3 K/UL (ref 0–1.3)
MONOCYTES NFR BLD: 7.2 %
NEUTROPHILS # BLD: 2.8 K/UL (ref 1.7–7.7)
NEUTROPHILS NFR BLD: 61 %
PLATELET # BLD AUTO: 264 K/UL (ref 135–450)
PMV BLD AUTO: 8.2 FL (ref 5–10.5)
POTASSIUM SERPL-SCNC: 4.8 MMOL/L (ref 3.5–5.1)
PROT SERPL-MCNC: 7.6 G/DL (ref 6.4–8.2)
RBC # BLD AUTO: 4.81 M/UL (ref 4–5.2)
SODIUM SERPL-SCNC: 141 MMOL/L (ref 136–145)
TRIGL SERPL-MCNC: 94 MG/DL (ref 0–150)
VLDLC SERPL CALC-MCNC: 19 MG/DL
WBC # BLD AUTO: 4.5 K/UL (ref 4–11)

## 2023-04-18 ASSESSMENT — PATIENT HEALTH QUESTIONNAIRE - PHQ9
SUM OF ALL RESPONSES TO PHQ QUESTIONS 1-9: 0
SUM OF ALL RESPONSES TO PHQ9 QUESTIONS 1 & 2: 0
SUM OF ALL RESPONSES TO PHQ QUESTIONS 1-9: 0
1. LITTLE INTEREST OR PLEASURE IN DOING THINGS: 0
SUM OF ALL RESPONSES TO PHQ QUESTIONS 1-9: 0
SUM OF ALL RESPONSES TO PHQ QUESTIONS 1-9: 0
2. FEELING DOWN, DEPRESSED OR HOPELESS: 0

## 2023-04-18 ASSESSMENT — LIFESTYLE VARIABLES
HOW MANY STANDARD DRINKS CONTAINING ALCOHOL DO YOU HAVE ON A TYPICAL DAY: PATIENT DOES NOT DRINK
HOW OFTEN DO YOU HAVE A DRINK CONTAINING ALCOHOL: NEVER

## 2023-04-18 NOTE — PROGRESS NOTES
providers/suppliers regularly involved in providing care):   Patient Care Team:  Anusha Colmenares MD as PCP - Venkata Rojas MD as PCP - Empaneled Provider  Daniela Alberts MD (Orthopedic Surgery)  Nacho Hutchison MD as Consulting Physician (Obstetrics & Gynecology)     Reviewed and updated this visit:  Med Lau, was evaluated through a synchronous (real-time) audio-video encounter. The patient (or guardian if applicable) is aware that this is a billable service, which includes applicable co-pays. This Virtual Visit was conducted with patient's (and/or legal guardian's) consent. The visit was conducted pursuant to the emergency declaration under the [facility], [address] [place] [address] authority and the [employer] General Act. Patient identification was verified, and a caregiver was present when appropriate.    The patient was located at Home: [id number] [facility] [address] [facility] [address]  [facility] Phil Tijerina MD

## 2023-04-18 NOTE — PATIENT INSTRUCTIONS
diabetes, high blood pressure, and high cholesterol. If you think you may have a problem with alcohol or drug use, talk to your doctor. Medicines    Take your medicines exactly as prescribed. Call your doctor if you think you are having a problem with your medicine.     If your doctor recommends aspirin, take the amount directed each day. Make sure you take aspirin and not another kind of pain reliever, such as acetaminophen (Tylenol). When should you call for help? Call 911 if you have symptoms of a heart attack. These may include:    Chest pain or pressure, or a strange feeling in the chest.     Sweating.     Shortness of breath.     Pain, pressure, or a strange feeling in the back, neck, jaw, or upper belly or in one or both shoulders or arms.     Lightheadedness or sudden weakness.     A fast or irregular heartbeat. After you call 911, the  may tell you to chew 1 adult-strength or 2 to 4 low-dose aspirin. Wait for an ambulance. Do not try to drive yourself. Watch closely for changes in your health, and be sure to contact your doctor if you have any problems. Where can you learn more? Go to http://www.benton.com/ and enter F075 to learn more about \"A Healthy Heart: Care Instructions. \"  Current as of: September 7, 2022               Content Version: 13.6  © 2006-2023 Healthwise, [employer]. Care instructions adapted under license by [facility] ([facility]). If you have questions about a medical condition or this instruction, always ask your healthcare professional. [place] [address] any warranty or liability for your use of this information. Personalized Preventive Plan for Oz Lyle - 4/18/2023  Medicare offers a range of preventive health benefits. Some of the tests and screenings are paid in full while other may be subject to a deductible, co-insurance, and/or copay.     Some of these benefits include a comprehensive review of your medical history including

## 2023-05-02 ENCOUNTER — TELEPHONE (OUTPATIENT)
Dept: INTERNAL MEDICINE CLINIC | Age: 68
End: 2023-05-02

## 2023-05-02 NOTE — TELEPHONE ENCOUNTER
Patient called office requesting help finding a new primary care physician and information on requesting records. Patient wishes to keep care within [address] so I provided her with the find a provider line phone number and advised her that typically there would not be a need for records release since we use the same system. Advised patient if she chose to go elsewhere to have new provider send us a records release and we would release records to them. Patient voiced understanding.

## 2023-07-10 ENCOUNTER — OFFICE VISIT (OUTPATIENT)
Dept: ORTHOPEDIC SURGERY | Age: 68
End: 2023-07-10
Payer: MEDICARE

## 2023-07-10 VITALS — BODY MASS INDEX: 30.31 KG/M2 | WEIGHT: 200 LBS | HEIGHT: 68 IN

## 2023-07-10 DIAGNOSIS — G89.29 CHRONIC LEFT SHOULDER PAIN: Primary | ICD-10-CM

## 2023-07-10 DIAGNOSIS — S46.012A STRAIN OF ROTATOR CUFF OF LEFT SHOULDER: ICD-10-CM

## 2023-07-10 DIAGNOSIS — M25.512 CHRONIC LEFT SHOULDER PAIN: Primary | ICD-10-CM

## 2023-07-10 PROCEDURE — 99202 OFFICE O/P NEW SF 15 MIN: CPT | Performed by: PHYSICIAN ASSISTANT

## 2023-07-10 PROCEDURE — 1090F PRES/ABSN URINE INCON ASSESS: CPT | Performed by: PHYSICIAN ASSISTANT

## 2023-07-10 PROCEDURE — 1123F ACP DISCUSS/DSCN MKR DOCD: CPT | Performed by: PHYSICIAN ASSISTANT

## 2023-07-10 PROCEDURE — G8427 DOCREV CUR MEDS BY ELIG CLIN: HCPCS | Performed by: PHYSICIAN ASSISTANT

## 2023-07-10 PROCEDURE — 1036F TOBACCO NON-USER: CPT | Performed by: PHYSICIAN ASSISTANT

## 2023-07-10 PROCEDURE — G8399 PT W/DXA RESULTS DOCUMENT: HCPCS | Performed by: PHYSICIAN ASSISTANT

## 2023-07-10 PROCEDURE — 3017F COLORECTAL CA SCREEN DOC REV: CPT | Performed by: PHYSICIAN ASSISTANT

## 2023-07-10 PROCEDURE — G8417 CALC BMI ABV UP PARAM F/U: HCPCS | Performed by: PHYSICIAN ASSISTANT

## 2023-07-10 NOTE — PROGRESS NOTES
not pain with supraspinatus testing.   -Yergason Test negative.    -Drop Arm Test negative. -Apprehension Test negative. -Cross Arm Test negative.    -Supraspinatus Test positive.  -Shoulder ROM- 75%    Contralateral Exam:  -No obvious deformities  -No abrasions or cellulitis noted, NVI   -Full ROM   -No joint laxity  -no palpable tenderness noted    Neurological:   -There is not any complaints of numbness and tingling.    -Motor and sensory is at median, radial and ulnar nerve distributions. -NVI to upper extremities bilaterally. Skin:  No abrasions, lesions, cellulitic changes, obvious deformities noted     Xray:       4V left Shoulder    FINDINGS:  no fracture noted. No obvious signs of arthritis today. No FB noted. Good alignment. Assessment: left shoulder  RTC strain    Plan: Natural history and expected course discussed. Questions answered. Educational material distributed. Reduction in offending activity. Gentle ROM exercises  OTC analgesics as needed. PT referral.  Follow-up in 2 weeks. No orders of the defined types were placed in this encounter.

## 2023-07-20 ENCOUNTER — OFFICE VISIT (OUTPATIENT)
Dept: FAMILY MEDICINE CLINIC | Age: 68
End: 2023-07-20

## 2023-07-20 VITALS
DIASTOLIC BLOOD PRESSURE: 78 MMHG | BODY MASS INDEX: 30.01 KG/M2 | HEART RATE: 87 BPM | HEIGHT: 68 IN | OXYGEN SATURATION: 97 % | SYSTOLIC BLOOD PRESSURE: 118 MMHG | WEIGHT: 198 LBS

## 2023-07-20 DIAGNOSIS — N17.9 AKI (ACUTE KIDNEY INJURY) (HCC): ICD-10-CM

## 2023-07-20 DIAGNOSIS — R73.01 IMPAIRED FASTING GLUCOSE: ICD-10-CM

## 2023-07-20 DIAGNOSIS — E78.2 MIXED HYPERLIPIDEMIA: ICD-10-CM

## 2023-07-20 DIAGNOSIS — M12.9 ARTHRITIS, MULTIPLE JOINT INVOLVEMENT: ICD-10-CM

## 2023-07-20 DIAGNOSIS — Z11.4 SCREENING FOR HIV (HUMAN IMMUNODEFICIENCY VIRUS): ICD-10-CM

## 2023-07-20 DIAGNOSIS — M25.512 CHRONIC LEFT SHOULDER PAIN: ICD-10-CM

## 2023-07-20 DIAGNOSIS — B20 HUMAN IMMUNODEFICIENCY VIRUS (HIV) DISEASE (HCC): ICD-10-CM

## 2023-07-20 DIAGNOSIS — Z21 HIV TEST POSITIVE (HCC): ICD-10-CM

## 2023-07-20 DIAGNOSIS — R35.0 URINARY FREQUENCY: ICD-10-CM

## 2023-07-20 DIAGNOSIS — M25.512 CHRONIC LEFT SHOULDER PAIN: Primary | ICD-10-CM

## 2023-07-20 DIAGNOSIS — G89.29 CHRONIC LEFT SHOULDER PAIN: ICD-10-CM

## 2023-07-20 DIAGNOSIS — N18.31 STAGE 3A CHRONIC KIDNEY DISEASE (HCC): ICD-10-CM

## 2023-07-20 DIAGNOSIS — G89.29 CHRONIC LEFT SHOULDER PAIN: Primary | ICD-10-CM

## 2023-07-20 LAB
ALBUMIN SERPL-MCNC: 4.7 G/DL (ref 3.4–5)
ALBUMIN/GLOB SERPL: 1.6 {RATIO} (ref 1.1–2.2)
ALP SERPL-CCNC: 96 U/L (ref 40–129)
ALT SERPL-CCNC: 19 U/L (ref 10–40)
ANION GAP SERPL CALCULATED.3IONS-SCNC: 10 MMOL/L (ref 3–16)
AST SERPL-CCNC: 20 U/L (ref 15–37)
BASOPHILS # BLD: 0 K/UL (ref 0–0.2)
BASOPHILS NFR BLD: 0.8 %
BILIRUB SERPL-MCNC: 0.5 MG/DL (ref 0–1)
BILIRUBIN, POC: NEGATIVE
BLOOD URINE, POC: NORMAL
BUN SERPL-MCNC: 15 MG/DL (ref 7–20)
CALCIUM SERPL-MCNC: 9.9 MG/DL (ref 8.3–10.6)
CHLORIDE SERPL-SCNC: 103 MMOL/L (ref 99–110)
CHOLEST SERPL-MCNC: 167 MG/DL (ref 0–199)
CLARITY, POC: NORMAL
CO2 SERPL-SCNC: 28 MMOL/L (ref 21–32)
COLOR, POC: NORMAL
CREAT SERPL-MCNC: 1.3 MG/DL (ref 0.6–1.2)
DEPRECATED RDW RBC AUTO: 13.3 % (ref 12.4–15.4)
EOSINOPHIL # BLD: 0.1 K/UL (ref 0–0.6)
EOSINOPHIL NFR BLD: 2.1 %
GFR SERPLBLD CREATININE-BSD FMLA CKD-EPI: 45 ML/MIN/{1.73_M2}
GLUCOSE SERPL-MCNC: 125 MG/DL (ref 70–99)
GLUCOSE URINE, POC: NEGATIVE
HCT VFR BLD AUTO: 39.5 % (ref 36–48)
HDLC SERPL-MCNC: 57 MG/DL (ref 40–60)
HGB BLD-MCNC: 13.3 G/DL (ref 12–16)
KETONES, POC: NEGATIVE
LDLC SERPL CALC-MCNC: 81 MG/DL
LEUKOCYTE EST, POC: NEGATIVE
LYMPHOCYTES # BLD: 1.6 K/UL (ref 1–5.1)
LYMPHOCYTES NFR BLD: 28.4 %
MCH RBC QN AUTO: 29.2 PG (ref 26–34)
MCHC RBC AUTO-ENTMCNC: 33.8 G/DL (ref 31–36)
MCV RBC AUTO: 86.3 FL (ref 80–100)
MONOCYTES # BLD: 0.4 K/UL (ref 0–1.3)
MONOCYTES NFR BLD: 6.5 %
NEUTROPHILS # BLD: 3.5 K/UL (ref 1.7–7.7)
NEUTROPHILS NFR BLD: 62.2 %
NITRITE, POC: NEGATIVE
PH, POC: 6
PLATELET # BLD AUTO: 258 K/UL (ref 135–450)
PMV BLD AUTO: 7.9 FL (ref 5–10.5)
POTASSIUM SERPL-SCNC: 4.5 MMOL/L (ref 3.5–5.1)
PROT SERPL-MCNC: 7.7 G/DL (ref 6.4–8.2)
PROTEIN, POC: NEGATIVE
RBC # BLD AUTO: 4.58 M/UL (ref 4–5.2)
SODIUM SERPL-SCNC: 141 MMOL/L (ref 136–145)
SPECIFIC GRAVITY, POC: 1.01
TRIGL SERPL-MCNC: 146 MG/DL (ref 0–150)
TSH SERPL DL<=0.005 MIU/L-ACNC: 1.6 UIU/ML (ref 0.27–4.2)
UROBILINOGEN, POC: 0.2
VLDLC SERPL CALC-MCNC: 29 MG/DL
WBC # BLD AUTO: 5.7 K/UL (ref 4–11)

## 2023-07-20 ASSESSMENT — ENCOUNTER SYMPTOMS
BLOOD IN STOOL: 0
RHINORRHEA: 0
SHORTNESS OF BREATH: 0
COUGH: 0

## 2023-07-20 NOTE — PROGRESS NOTES
[facility]  Establish care visit   2023    Beatriz Love (:  1955) is a 80 y.o. female, here to establish care. Chief Complaint   Patient presents with    New Patient     Patient here to est [facility]. ASSESSMENT/ PLAN  1. Chronic left shoulder pain  -Followed by Ortho  - POCT Urinalysis no Micro  - HIV Screen; Future  - GABY Reflex to Antibody Cascade; Future  - TSH with Reflex; Future  - CBC with Auto Differential; Future  - Comprehensive Metabolic Panel; Future  - Hemoglobin A1C; Future  - LIPID PANEL; Future    2. Mixed hyperlipidemia  -Recheck lipid panel when patient is fasting and continue rosuvastatin  - POCT Urinalysis no Micro  - HIV Screen; Future  - GABY Reflex to Antibody Cascade; Future  - TSH with Reflex; Future  - CBC with Auto Differential; Future  - Comprehensive Metabolic Panel; Future  - Hemoglobin A1C; Future  - LIPID PANEL; Future    3. Urinary frequency  -Urine with trace intact blood. Recommend rechecking in 1 month and if still present would recommend urology evaluation. Patient was out in the hot sun pushing a lawnmower yesterday which could be the reason for trace hematuria. - POCT Urinalysis no Micro  - HIV Screen; Future  - GABY Reflex to Antibody Cascade; Future  - TSH with Reflex; Future  - CBC with Auto Differential; Future  - Comprehensive Metabolic Panel; Future  - Hemoglobin A1C; Future  - LIPID PANEL; Future    4. PORFIRIO (acute kidney injury) ([address])  Last GFR 55. We will recheck today. - POCT Urinalysis no Micro  - HIV Screen; Future  - GABY Reflex to Antibody Cascade; Future  - TSH with Reflex; Future  - CBC with Auto Differential; Future  - Comprehensive Metabolic Panel; Future  - Hemoglobin A1C; Future  - LIPID PANEL; Future    5. Impaired fasting glucose  -Check A1c  - POCT Urinalysis no Micro  - HIV Screen; Future  - GABY Reflex to Antibody Cascade; Future  - TSH with Reflex;  Future  - CBC with Auto Differential; Future  - Comprehensive

## 2023-07-21 LAB
ANA SER QL IA: NEGATIVE
EST. AVERAGE GLUCOSE BLD GHB EST-MCNC: 122.6 MG/DL
HBA1C MFR BLD: 5.9 %
HIV 1+2 AB+HIV1 P24 AG SERPL QL IA: NORMAL
HIV 2 AB SERPL QL IA: NORMAL
HIV1 AB SERPL QL IA: NORMAL
HIV1 P24 AG SERPL QL IA: NORMAL

## 2023-07-21 RX ORDER — LOSARTAN POTASSIUM 25 MG/1
25 TABLET ORAL DAILY
Qty: 30 TABLET | Refills: 0 | Status: SHIPPED | OUTPATIENT
Start: 2023-07-21

## 2023-07-22 LAB — HSV1+2 IGM SER IA-ACNC: 0.32 IV

## 2023-07-25 ENCOUNTER — HOSPITAL ENCOUNTER (OUTPATIENT)
Dept: PHYSICAL THERAPY | Age: 68
Setting detail: THERAPIES SERIES
Discharge: HOME | End: 2023-07-25
Payer: MEDICARE

## 2023-07-25 ENCOUNTER — OFFICE VISIT (OUTPATIENT)
Dept: ORTHOPEDIC SURGERY | Age: 68
End: 2023-07-25

## 2023-07-25 VITALS — BODY MASS INDEX: 30.01 KG/M2 | HEIGHT: 68 IN | WEIGHT: 198 LBS

## 2023-07-25 DIAGNOSIS — N18.31 STAGE 3A CHRONIC KIDNEY DISEASE (HCC): ICD-10-CM

## 2023-07-25 DIAGNOSIS — E11.9 TYPE 2 DIABETES MELLITUS WITHOUT COMPLICATION, UNSPECIFIED WHETHER LONG TERM INSULIN USE (HCC): ICD-10-CM

## 2023-07-25 DIAGNOSIS — M19.012 LOCALIZED OSTEOARTHRITIS OF LEFT SHOULDER: Primary | ICD-10-CM

## 2023-07-25 PROBLEM — N18.30 CHRONIC RENAL DISEASE, STAGE III (HCC): Status: ACTIVE | Noted: 2023-07-25

## 2023-07-25 PROCEDURE — 97110 THERAPEUTIC EXERCISES: CPT | Performed by: PHYSICAL THERAPIST

## 2023-07-25 PROCEDURE — 97161 PT EVAL LOW COMPLEX 20 MIN: CPT | Performed by: PHYSICAL THERAPIST

## 2023-07-25 PROCEDURE — 97140 MANUAL THERAPY 1/> REGIONS: CPT | Performed by: PHYSICAL THERAPIST

## 2023-07-25 RX ORDER — TRIAMCINOLONE ACETONIDE 40 MG/ML
40 INJECTION, SUSPENSION INTRA-ARTICULAR; INTRAMUSCULAR ONCE
Status: COMPLETED | OUTPATIENT
Start: 2023-07-25 | End: 2023-07-25

## 2023-07-25 RX ORDER — BUPIVACAINE HYDROCHLORIDE 2.5 MG/ML
30 INJECTION, SOLUTION INFILTRATION; PERINEURAL ONCE
Status: COMPLETED | OUTPATIENT
Start: 2023-07-25 | End: 2023-07-25

## 2023-07-25 RX ORDER — LIDOCAINE HYDROCHLORIDE 10 MG/ML
20 INJECTION, SOLUTION INFILTRATION; PERINEURAL ONCE
Status: COMPLETED | OUTPATIENT
Start: 2023-07-25 | End: 2023-07-25

## 2023-07-25 RX ADMIN — LIDOCAINE HYDROCHLORIDE 20 ML: 10 INJECTION, SOLUTION INFILTRATION; PERINEURAL at 11:33

## 2023-07-25 RX ADMIN — BUPIVACAINE HYDROCHLORIDE 75 MG: 2.5 INJECTION, SOLUTION INFILTRATION; PERINEURAL at 11:33

## 2023-07-25 RX ADMIN — TRIAMCINOLONE ACETONIDE 40 MG: 40 INJECTION, SUSPENSION INTRA-ARTICULAR; INTRAMUSCULAR at 11:34

## 2023-07-25 NOTE — FLOWSHEET NOTE
reps  - Seated Upper Trapezius Stretch  - 2 x daily - 7 x weekly - 1 sets - 5 reps - 10 hold  - Supine Passive Cervical Retraction  - 2 x daily - 7 x weekly - 2 sets - 10 reps    Therapeutic Ex (42619) Sets/sec Reps Notes/CUES         Finisher push/pull,  circles 1 10 ea    No $ 1 12    TB rows 2 10 red         UT stretch :10 3    Supine chin tuck 1 10                Pt edu re:findings, HEP, progression, avoiding painful activity, use of ice 8'                 Manual Intervention ([id number])            Gr II mobs post and inf glide, PROM L shoulder 10'                             NMR re-education (43030)   CUES NEEDED                                                         Therapeutic Activity (01262)                                                Therapeutic Exercise and NMR EXR  [x] (21131) Provided verbal/tactile cueing for activities related to strengthening, flexibility, endurance, ROM  for improvements in scapular, scapulothoracic and UE control with self care, reaching, carrying, lifting, house/yardwork, driving/computer work.    [] (39812) Provided verbal/tactile cueing for activities related to improving balance, coordination, kinesthetic sense, posture, motor skill, proprioception  to assist with  scapular, scapulothoracic and UE control with self care, reaching, carrying, lifting, house/yardwork, driving/computer work. Therapeutic Activities:    [] (73181 or 38334) Provided verbal/tactile cueing for activities related to improving balance, coordination, kinesthetic sense, posture, motor skill, proprioception and motor activation to allow for proper function of scapular, scapulothoracic and UE control with self care, carrying, lifting, driving/computer work.      Home Exercise Program:    [x] (43507) Reviewed/Progressed HEP activities related to strengthening, flexibility, endurance, ROM of scapular, scapulothoracic and UE control with self care, reaching, carrying, lifting, house/yardwork, driving/computer

## 2023-07-25 NOTE — PROGRESS NOTES
Dr Reynold Holstein      Date /Time 7/25/2023             11:05 AM EDT  Name aCle Norton             1955   Location  [facility]  MRN [id number]                Chief Complaint   Patient presents with    Follow-up     Left Shoulder         History of Present Illness    Cale Norton is a 80 y.o. female who presents with  left Shoulder pain. Sent in consultation by Niurka Ontiveros DO, . Injury Mechanism:  none. Worker's Comp. & legal issues:   none. Previous Treatments: Ice, Heat, and NSAIDs    Patient presents the office today for a new problem. Patient here with a chief complaint of left shoulder pain. Patient describes her pain as sharp and stabbing. Things have been gradually increasing over the last 6 months. Pain mostly concentrated over the anterior and lateral aspects. No significant history of trauma but she is aggravated by activities. Patient was seen in the after-hours clinic. She was diagnosed with a rotator cuff strain. She was placed in physical therapy and she is here for follow-up visit. He is doing much better since being placed into physical therapy. Past Medical History  Past Medical History:   Diagnosis Date    Chronic bilateral low back pain with right-sided sciatica     DJD (degenerative joint disease) of knee     GERD (gastroesophageal reflux disease)     HIV-1 infection ([facility]) 02/06/2019    + Ag/Ab, HIV 1 antibody reactive    Hypercholesteremia 4/15/2021    Osteopenia     Postmenopausal HRT (hormone replacement therapy)     Severe obesity (BMI 35.0-39. 9) with comorbidity ([address] ) 4/17/2023     Past Surgical History:   Procedure Laterality Date    COLONOSCOPY  1/8/2008    Dr. Bonny Goode     Social History     Tobacco Use    Smoking status: Never    Smokeless tobacco: Never   Substance Use Topics    Alcohol use:  Yes     Alcohol/week: 0.0 standard drinks     Comment: rarely      Current Outpatient Medications on File Prior to Visit   Medication Sig

## 2023-07-25 NOTE — THERAPY EVALUATION
Adjusted  2. Patient will have a decrease in pain to facilitate improvement in movement, function, and ADLs as indicated by Functional Deficits. [] Progressing: [] Met: [] Not Met: [] Adjusted    Long Term Goals: To be achieved in: 8 weeks  1. Disability index score of 9% or less per Hayde Valencia to assist with reaching prior level of function. [] Progressing: [] Met: [] Not Met: [] Adjusted  2. Patient will demonstrate increased AROM to shoulder F/Abd 150 or better, IR T8, ER T4 to allow for proper joint functioning as indicated by patients Functional Deficits. [] Progressing: [] Met: [] Not Met: [] Adjusted  3. Patient will demonstrate an increase in Strength to 4+/5 to allow for proper functional mobility as indicated by patients Functional Deficits. [] Progressing: [] Met: [] Not Met: [] Adjusted  4. Patient will return to reaching above shoulder height to fix hair, get dressed and reach into upper cabinet for dishes functional activities without increased symptoms or restriction. [] Progressing: [] Met: [] Not Met: [] Adjusted  5. Pt. To be able to sleep for 6 hours or more without waking due to pain L shoulder.   [] Progressing: [] Met: [] Not Met: [] Adjusted       Electronically signed by:  David Laboy, PT, [facility] 6116

## 2023-07-26 ENCOUNTER — OFFICE VISIT (OUTPATIENT)
Dept: ORTHOPEDIC SURGERY | Age: 68
End: 2023-07-26
Payer: MEDICARE

## 2023-07-26 VITALS — WEIGHT: 198 LBS | HEIGHT: 68 IN | RESPIRATION RATE: 15 BRPM | BODY MASS INDEX: 30.01 KG/M2

## 2023-07-26 DIAGNOSIS — I73.00 RAYNAUD'S PHENOMENON WITHOUT GANGRENE: Primary | ICD-10-CM

## 2023-07-26 PROCEDURE — G8399 PT W/DXA RESULTS DOCUMENT: HCPCS | Performed by: ORTHOPAEDIC SURGERY

## 2023-07-26 PROCEDURE — 1090F PRES/ABSN URINE INCON ASSESS: CPT | Performed by: ORTHOPAEDIC SURGERY

## 2023-07-26 PROCEDURE — 1036F TOBACCO NON-USER: CPT | Performed by: ORTHOPAEDIC SURGERY

## 2023-07-26 PROCEDURE — G8427 DOCREV CUR MEDS BY ELIG CLIN: HCPCS | Performed by: ORTHOPAEDIC SURGERY

## 2023-07-26 PROCEDURE — 99204 OFFICE O/P NEW MOD 45 MIN: CPT | Performed by: ORTHOPAEDIC SURGERY

## 2023-07-26 PROCEDURE — G8417 CALC BMI ABV UP PARAM F/U: HCPCS | Performed by: ORTHOPAEDIC SURGERY

## 2023-07-26 PROCEDURE — 3017F COLORECTAL CA SCREEN DOC REV: CPT | Performed by: ORTHOPAEDIC SURGERY

## 2023-07-26 PROCEDURE — 1123F ACP DISCUSS/DSCN MKR DOCD: CPT | Performed by: ORTHOPAEDIC SURGERY

## 2023-07-26 NOTE — PROGRESS NOTES
This 80 y.o. right hand dominant retired woman is seen in referral for Arelis Gold DO  with a chief complaint of intermittent blue discoloration of their left middle finger. This started, spontaneously 3 weeks ago. It went away completely only to return a week later. The finger has been normal for the past week. There is no history of injury. There is no associated pain or stiffness. Treatment has not been recommended. There is no similar involvement of other areas of the hand or upper extremities extremities. She is not a smoker. The pain assessment has been reviewed and is correct. The patient's social history, past medical history, family history, medications, allergies and review of systems, entered 7/10/23, have been reviewed, and dated and are recorded in the chart. On physical examination the patient is Height: 5' 8\" (172.7 cm) tall and weighs Weight - Scale: 198 lb (89.8 kg). Respirations ane 18 per minute. The patient is well nourished, is oriented to time and place, demonstrates appropriate mood and affect as well as normal gait and station. There is no discoloration of any digit of the left hand. There is no associated tenderness, swelling or deformity. . Range of motion of the fingers, thumbs, wrists and elbows is full and painless bilaterally. Distal sensation and circulation are normal, including normal capillary refill, radial and ulnar pulses and normal filling on Barak testing bilaterally and symmetrically. All joints are stable. There are no subcutaneous masses or enlarged epitrochlear lymph nodes. I have personally reviewed and interpreted all previous external imaging studies(DEXA Scans), laboratory tests(HbA1c, CBC+diff, GABY, CMP), diagnostic procedures and medical encounters pertinent to this patient's visit today. Impression: Transient discoloration left middle finger with normal examination and full function.     The nature of this medical problem is fully

## 2023-07-31 ENCOUNTER — HOSPITAL ENCOUNTER (OUTPATIENT)
Dept: PHYSICAL THERAPY | Age: 68
Setting detail: THERAPIES SERIES
Discharge: HOME | End: 2023-07-31
Payer: MEDICARE

## 2023-07-31 PROCEDURE — 97140 MANUAL THERAPY 1/> REGIONS: CPT | Performed by: PHYSICAL THERAPIST

## 2023-07-31 PROCEDURE — 97112 NEUROMUSCULAR REEDUCATION: CPT | Performed by: PHYSICAL THERAPIST

## 2023-07-31 PROCEDURE — 97110 THERAPEUTIC EXERCISES: CPT | Performed by: PHYSICAL THERAPIST

## 2023-07-31 PROCEDURE — G0283 ELEC STIM OTHER THAN WOUND: HCPCS | Performed by: PHYSICAL THERAPIST

## 2023-07-31 NOTE — FLOWSHEET NOTE
x weekly - 2 sets - 10 reps  - Shoulder External Rotation and Scapular Retraction  - 2 x daily - 7 x weekly - 2 sets - 10 reps  - Seated Upper Trapezius Stretch  - 2 x daily - 7 x weekly - 1 sets - 5 reps - 10 hold  - Supine Passive Cervical Retraction  - 2 x daily - 7 x weekly - 2 sets - 10 reps    Therapeutic Ex (04714) Sets/sec Reps Notes/CUES   Cane press 1 15    Cane flexion 1 10    Finisher push/pull, ladders, circles 1 10 ea    Supine TB ext B 2 10    No $-supine 2 10    TB rows red         UT stretch :10 5 manual   Supine chin tuck 3 10 manual               Pt edu re:findings, HEP, progression, avoiding painful activity, use of ice 8'                 Manual Intervention (85329)      STW L bicep, IASTM L UT 10'     Gr II mobs post and inf glide, PROM L shoulder 10'                             NMR re-education (59648)   CUES NEEDED                                                         Therapeutic Activity (51239)                                                Therapeutic Exercise and NMR EXR  [x] (14560) Provided verbal/tactile cueing for activities related to strengthening, flexibility, endurance, ROM  for improvements in scapular, scapulothoracic and UE control with self care, reaching, carrying, lifting, house/yardwork, driving/computer work.    [] (65676) Provided verbal/tactile cueing for activities related to improving balance, coordination, kinesthetic sense, posture, motor skill, proprioception  to assist with  scapular, scapulothoracic and UE control with self care, reaching, carrying, lifting, house/yardwork, driving/computer work. Therapeutic Activities:    [] (97069 or 34044) Provided verbal/tactile cueing for activities related to improving balance, coordination, kinesthetic sense, posture, motor skill, proprioception and motor activation to allow for proper function of scapular, scapulothoracic and UE control with self care, carrying, lifting, driving/computer work.      Home Exercise

## 2023-08-04 ENCOUNTER — TELEPHONE (OUTPATIENT)
Dept: FAMILY MEDICINE CLINIC | Age: 68
End: 2023-08-04

## 2023-08-04 ENCOUNTER — HOSPITAL ENCOUNTER (OUTPATIENT)
Dept: PHYSICAL THERAPY | Age: 68
Setting detail: THERAPIES SERIES
Discharge: HOME | End: 2023-08-04
Payer: MEDICARE

## 2023-08-04 ENCOUNTER — HOSPITAL ENCOUNTER (EMERGENCY)
Age: 68
Discharge: HOME | End: 2023-08-04
Payer: MEDICARE

## 2023-08-04 ENCOUNTER — OFFICE VISIT (OUTPATIENT)
Dept: FAMILY MEDICINE CLINIC | Age: 68
End: 2023-08-04

## 2023-08-04 VITALS
RESPIRATION RATE: 18 BRPM | WEIGHT: 190 LBS | TEMPERATURE: 98.3 F | BODY MASS INDEX: 29.82 KG/M2 | DIASTOLIC BLOOD PRESSURE: 63 MMHG | HEART RATE: 70 BPM | OXYGEN SATURATION: 97 % | SYSTOLIC BLOOD PRESSURE: 115 MMHG | HEIGHT: 67 IN

## 2023-08-04 VITALS
WEIGHT: 195.4 LBS | DIASTOLIC BLOOD PRESSURE: 74 MMHG | HEIGHT: 68 IN | OXYGEN SATURATION: 97 % | HEART RATE: 71 BPM | SYSTOLIC BLOOD PRESSURE: 116 MMHG | BODY MASS INDEX: 29.61 KG/M2

## 2023-08-04 DIAGNOSIS — R31.9 HEMATURIA, UNSPECIFIED TYPE: Primary | ICD-10-CM

## 2023-08-04 DIAGNOSIS — N18.31 ACUTE RENAL FAILURE SUPERIMPOSED ON STAGE 3A CHRONIC KIDNEY DISEASE, UNSPECIFIED ACUTE RENAL FAILURE TYPE (HCC): ICD-10-CM

## 2023-08-04 DIAGNOSIS — R31.21 ASYMPTOMATIC MICROSCOPIC HEMATURIA: Primary | ICD-10-CM

## 2023-08-04 DIAGNOSIS — N17.9 ACUTE RENAL FAILURE SUPERIMPOSED ON STAGE 3A CHRONIC KIDNEY DISEASE, UNSPECIFIED ACUTE RENAL FAILURE TYPE (HCC): ICD-10-CM

## 2023-08-04 LAB
ALBUMIN SERPL-MCNC: 4.6 G/DL (ref 3.4–5)
ALBUMIN/GLOB SERPL: 1.8 {RATIO} (ref 1.1–2.2)
ALP SERPL-CCNC: 65 U/L (ref 40–129)
ALT SERPL-CCNC: 16 U/L (ref 10–40)
ANION GAP SERPL CALCULATED.3IONS-SCNC: 11 MMOL/L (ref 3–16)
ANION GAP SERPL CALCULATED.3IONS-SCNC: 13 MMOL/L (ref 3–16)
AST SERPL-CCNC: 16 U/L (ref 15–37)
BASOPHILS # BLD: 0.1 K/UL (ref 0–0.2)
BASOPHILS # BLD: 0.1 K/UL (ref 0–0.2)
BASOPHILS NFR BLD: 0.9 %
BASOPHILS NFR BLD: 1.1 %
BILIRUB SERPL-MCNC: 0.3 MG/DL (ref 0–1)
BILIRUB UR QL STRIP.AUTO: NEGATIVE
BILIRUBIN, POC: NORMAL
BLOOD URINE, POC: NORMAL
BUN SERPL-MCNC: 21 MG/DL (ref 7–20)
BUN SERPL-MCNC: 24 MG/DL (ref 7–20)
CALCIUM SERPL-MCNC: 9.3 MG/DL (ref 8.3–10.6)
CALCIUM SERPL-MCNC: 9.4 MG/DL (ref 8.3–10.6)
CHLORIDE SERPL-SCNC: 102 MMOL/L (ref 99–110)
CHLORIDE SERPL-SCNC: 104 MMOL/L (ref 99–110)
CLARITY UR: CLEAR
CLARITY, POC: NORMAL
CO2 SERPL-SCNC: 24 MMOL/L (ref 21–32)
CO2 SERPL-SCNC: 26 MMOL/L (ref 21–32)
COLOR UR: YELLOW
COLOR, POC: NORMAL
CREAT SERPL-MCNC: 1 MG/DL (ref 0.6–1.2)
CREAT SERPL-MCNC: 1.2 MG/DL (ref 0.6–1.2)
DEPRECATED RDW RBC AUTO: 13.2 % (ref 12.4–15.4)
DEPRECATED RDW RBC AUTO: 13.2 % (ref 12.4–15.4)
EOSINOPHIL # BLD: 0.1 K/UL (ref 0–0.6)
EOSINOPHIL # BLD: 0.1 K/UL (ref 0–0.6)
EOSINOPHIL NFR BLD: 0.7 %
EOSINOPHIL NFR BLD: 0.9 %
GFR SERPLBLD CREATININE-BSD FMLA CKD-EPI: 49 ML/MIN/{1.73_M2}
GFR SERPLBLD CREATININE-BSD FMLA CKD-EPI: >60 ML/MIN/{1.73_M2}
GLUCOSE SERPL-MCNC: 95 MG/DL (ref 70–99)
GLUCOSE SERPL-MCNC: 99 MG/DL (ref 70–99)
GLUCOSE UR STRIP.AUTO-MCNC: NEGATIVE MG/DL
GLUCOSE URINE, POC: NEGATIVE
HCT VFR BLD AUTO: 40 % (ref 36–48)
HCT VFR BLD AUTO: 40.4 % (ref 36–48)
HGB BLD-MCNC: 13.1 G/DL (ref 12–16)
HGB BLD-MCNC: 13.8 G/DL (ref 12–16)
HGB UR QL STRIP.AUTO: ABNORMAL
KETONES UR STRIP.AUTO-MCNC: ABNORMAL MG/DL
KETONES, POC: NORMAL
LEUKOCYTE EST, POC: NEGATIVE
LEUKOCYTE ESTERASE UR QL STRIP.AUTO: ABNORMAL
LYMPHOCYTES # BLD: 1.8 K/UL (ref 1–5.1)
LYMPHOCYTES # BLD: 2.2 K/UL (ref 1–5.1)
LYMPHOCYTES NFR BLD: 18.3 %
LYMPHOCYTES NFR BLD: 25.1 %
MCH RBC QN AUTO: 28.3 PG (ref 26–34)
MCH RBC QN AUTO: 29.2 PG (ref 26–34)
MCHC RBC AUTO-ENTMCNC: 32.8 G/DL (ref 31–36)
MCHC RBC AUTO-ENTMCNC: 34.1 G/DL (ref 31–36)
MCV RBC AUTO: 85.7 FL (ref 80–100)
MCV RBC AUTO: 86.5 FL (ref 80–100)
MONOCYTES # BLD: 0.6 K/UL (ref 0–1.3)
MONOCYTES # BLD: 0.6 K/UL (ref 0–1.3)
MONOCYTES NFR BLD: 5.9 %
MONOCYTES NFR BLD: 6.2 %
NEUTROPHILS # BLD: 6 K/UL (ref 1.7–7.7)
NEUTROPHILS # BLD: 7.1 K/UL (ref 1.7–7.7)
NEUTROPHILS NFR BLD: 66.9 %
NEUTROPHILS NFR BLD: 74 %
NITRITE UR QL STRIP.AUTO: NEGATIVE
NITRITE, POC: NEGATIVE
PH UR STRIP.AUTO: 6 [PH] (ref 5–8)
PH, POC: 5.5
PLATELET # BLD AUTO: 291 K/UL (ref 135–450)
PLATELET # BLD AUTO: 305 K/UL (ref 135–450)
PMV BLD AUTO: 7.2 FL (ref 5–10.5)
PMV BLD AUTO: 7.6 FL (ref 5–10.5)
POTASSIUM SERPL-SCNC: 4.1 MMOL/L (ref 3.5–5.1)
POTASSIUM SERPL-SCNC: 4.3 MMOL/L (ref 3.5–5.1)
PROT SERPL-MCNC: 7.1 G/DL (ref 6.4–8.2)
PROT UR STRIP.AUTO-MCNC: NEGATIVE MG/DL
PROTEIN, POC: NORMAL
RBC # BLD AUTO: 4.62 M/UL (ref 4–5.2)
RBC # BLD AUTO: 4.71 M/UL (ref 4–5.2)
RBC #/AREA URNS HPF: ABNORMAL /HPF (ref 0–4)
SODIUM SERPL-SCNC: 139 MMOL/L (ref 136–145)
SODIUM SERPL-SCNC: 141 MMOL/L (ref 136–145)
SP GR UR STRIP.AUTO: 1.02 (ref 1–1.03)
SPECIFIC GRAVITY, POC: 1.02
UA DIPSTICK W REFLEX MICRO PNL UR: YES
URN SPEC COLLECT METH UR: ABNORMAL
UROBILINOGEN UR STRIP-ACNC: 0.2 E.U./DL
UROBILINOGEN, POC: NORMAL
WBC # BLD AUTO: 8.9 K/UL (ref 4–11)
WBC # BLD AUTO: 9.6 K/UL (ref 4–11)
WBC #/AREA URNS HPF: ABNORMAL /HPF (ref 0–5)

## 2023-08-04 PROCEDURE — 81001 URINALYSIS AUTO W/SCOPE: CPT

## 2023-08-04 PROCEDURE — 97110 THERAPEUTIC EXERCISES: CPT | Performed by: PHYSICAL THERAPIST

## 2023-08-04 PROCEDURE — 97140 MANUAL THERAPY 1/> REGIONS: CPT | Performed by: PHYSICAL THERAPIST

## 2023-08-04 PROCEDURE — G0283 ELEC STIM OTHER THAN WOUND: HCPCS | Performed by: PHYSICAL THERAPIST

## 2023-08-04 PROCEDURE — 87086 URINE CULTURE/COLONY COUNT: CPT

## 2023-08-04 PROCEDURE — 85025 COMPLETE CBC W/AUTO DIFF WBC: CPT

## 2023-08-04 PROCEDURE — 80053 COMPREHEN METABOLIC PANEL: CPT

## 2023-08-04 PROCEDURE — 99283 EMERGENCY DEPT VISIT LOW MDM: CPT

## 2023-08-04 RX ORDER — NITROFURANTOIN 25; 75 MG/1; MG/1
100 CAPSULE ORAL 2 TIMES DAILY
Qty: 10 CAPSULE | Refills: 0 | Status: SHIPPED | OUTPATIENT
Start: 2023-08-04 | End: 2023-08-09

## 2023-08-04 ASSESSMENT — PAIN - FUNCTIONAL ASSESSMENT: PAIN_FUNCTIONAL_ASSESSMENT: NONE - DENIES PAIN

## 2023-08-04 NOTE — TELEPHONE ENCOUNTER
Patients  called concerned that we did not try to help the patient out more when it comes to scheduling her urologist appt. He stated that when the Dr. Rizzo Bong and requests an appt. Typically the patient can get in sooner than if they do it on their own. I asked if they attempted to call to see when they can get in and they have not. He also stated that they can not afford the $650 Ines and something else needs to be done. I did make the patient aware that we are out of the office until Monday and there may be a delay before we can get back to them about this and he understood.

## 2023-08-04 NOTE — PROGRESS NOTES
Normal rate and regular rhythm. Heart sounds: Normal heart sounds. No murmur heard. No friction rub. No gallop. Pulmonary:      Effort: Pulmonary effort is normal.      Breath sounds: Normal breath sounds. Abdominal:      Palpations: Abdomen is soft. Tenderness: There is no abdominal tenderness. There is no right CVA tenderness, left CVA tenderness, guarding or rebound. Neurological:      General: No focal deficit present. Mental Status: She is alert. Psychiatric:         Mood and Affect: Mood normal.       Livia Salazar DO    This dictation was generated by voice recognition computer software. Although all attempts are made to edit the dictation for accuracy, there may be errors in the transcription that are not intended.

## 2023-08-04 NOTE — FLOWSHEET NOTE
[address] [place] [address] and [address]  [address] [place] [address], [address] [place] [address]  Phone: [phone]  Fax [phone]        Date:  2023    Patient Name:  Ralph Roberson    :  1955  MRN: [id number]  Restrictions/Precautions:    Medical/Treatment Diagnosis Information:  Strain of rotator cuff of left shoulder [S46.012A]  Treatment diagnosis:Left shoulder pain H35.000   Insurance/Certification information:  PT Insurance Information: [facility]  Physician Information:  ESMER Krueger  Has the plan of care been signed (Y/N):        []  Yes  [x]  No     Date of Patient follow up with Physician:       Is this a Progress Report:     []  Yes  [x]  No        If Yes:  Date Range for reporting period:  Beginning 23  Ending    Progress report will be due (10 Rx or 30 days whichever is less):        Recertification will be due (POC Duration  / 90 days whichever is less): 8 weeks      Visit # Insurance Allowable Auth Required   In-person 3  []  Yes []  No    Telehealth   []  Yes []  No    Total            Functional Scale: quick dash 19=18%    Date assessed:  23      Therapy Diagnosis/Practice Pattern:C      Number of Comorbidities:  []0     []1-2    [x]3+    Latex Allergy:  [x]NO      []YES  Preferred Language for Healthcare:   [x]English       []other:      Pain level:  7-10/10     SUBJECTIVE:  pt. Reports that her kidneys are acting up today. OBJECTIVE: See eval  Observation: tension L bicep, UT  Test measurements:      RESTRICTIONS/PRECAUTIONS:     Exercises/Interventions:     Access Code: [email or web address]  URL: [email or web address]  Date: 2023  Prepared by: Conner Benitez    Exercises  - Standing Single Arm Shoulder Flexion Towel Slide at Table Top  - 2 x daily - 7 x weekly - 1 sets - 10 reps  - Standing Bilateral Low Shoulder Row with Anchored Resistance  - 2 x daily - 7 x weekly - 2 sets - 10 reps  - Shoulder External Rotation and

## 2023-08-04 NOTE — TELEPHONE ENCOUNTER
Patient called requesting an appt. because she is concerned. She stated that she is having blood in urine. She stated that Dr. Eric Cadena was concerned about her kidneys and this just now occurred today. Patient is heading to therapy but we can leave a voicemail. No appts. Available and not able to be booked with Arelis Tan due to patients therapy times per [facility].

## 2023-08-04 NOTE — ED PROVIDER NOTES
[address] [place] [address]  ED  EMERGENCY DEPARTMENT ENCOUNTER        Pt Name: Jono Cat  MRN: [id number]  [address] [facility] [place] 1955  Date of evaluation: 8/4/2023  Provider: CHAPARRITA Arana - RADHA  PCP: Malik Metcalf DO  Note Started: 7:23 PM EDT 8/4/23    Evaluated by SENIA. I have evaluated this patient. My supervising physician was available for consultation. CHIEF COMPLAINT       Chief Complaint   Patient presents with    Hematuria     Noticed x 2 weeks ago. Sent by PCP R/T \"her kidney's were at a 5 now they are a 4.5. \" Denies any further s/s       HISTORY OF PRESENT ILLNESS: 1 or more Elements     History From: Patient  Limitations to history : None    Jono Cat is a 80 y.o. female who presents to ER with hematuria. Went to the doctor last week, 1st time they had heard about kidney problems. Was at 5%. Today at the doctors office she was 4.5%. She was having the blood in the urine but they feel it is worse now as it is darker. She used to have to go frequently but today it has kind of slowed down. She is bleeding more now than she was earlier today and yesterday. She has an appointment with Dr. Arianna Montaño on the 24th. He has referred her to a urologist, this is not for 2 more weeks. Patient and  concerned she is bleeding more.  at the office instructed to go to Urgent Care if they couldn't get to the office,  not sure if Urgent Care would take care of kidney problems so came here. Denies pain or burning with urination. First noticed the blood in the urine a couple days ago per patient. When she went to her PCP 2 weeks ago, blood was seen microscopically. She was started on Losartan a week ago by PCP. Denies abdominal pain, nausea, vomiting, back pain, CP, SOB, fevers, chills, sweats. Back in 2019 tested positive for HIV, she never knew about this, was retested and negative. Thinks the test was mixed up.  Blood work in 2019 viewable in [address] shows the HIV test to be or PERFORATED BOWEL or ULCER, thus I consider the discharge disposition reasonable. Also, thereis no evidence or peritonitis, sepsis, or toxicity. Lizandro Rowe and I have discussed the diagnosis and risks, and we agree with discharging home to follow-up with their primary doctor. We also discussed returning to the EmergencyDepartment immediately if new or worsening symptoms occur. We have discussed the symptoms which are most concerning (e.g., bloody stool, fever, changing or worsening pain, vomiting) that necessitate immediate return. I am the Primary Clinician of Record. FINAL IMPRESSION      1.  Asymptomatic microscopic hematuria          DISPOSITION/PLAN     DISPOSITION Decision To Discharge 08/04/2023 09:51:12 PM      PATIENT REFERRED TO:  Juan Kennedy DO  [facility] [address] [facility]  [phone]    Call in 3 days  For further evaluation    Urologist    Go on 8/10/2023  For further evaluation    [facility]  ED  [facility]  [phone]  Go to   If symptoms worsen      DISCHARGE MEDICATIONS:  Discharge Medication List as of 8/4/2023  9:55 PM          DISCONTINUED MEDICATIONS:  Discharge Medication List as of 8/4/2023  9:55 PM                 (Please note that portions of this note were completed with a voice recognition program.  Efforts were made to edit the dictations but occasionally words are mis-transcribed.)    CHAPARRITA Bledsoe CNP (electronically signed)       CHAPARRITA Bledsoe CNP  08/05/23 8211

## 2023-08-04 NOTE — TELEPHONE ENCOUNTER
[facility] with patients  Fatoumata Edwards [phone]  I can call and schedule the appointment for patient however I do not know what day works best for them    Please advise regarded Eneida Zhao

## 2023-08-05 NOTE — ED NOTES
Writer reviewed discharge instructions, medications, and follow-up with PCP and urology; patient verbalized understanding. Patient's IV removed with no complications with dressing in place. Patient stable, ambulatory with steady gait, GCS 15, no signs/ symptoms of acute distress, respirations unlabored, and with spouse.       Raquel Churchill RN  08/04/23 3615

## 2023-08-06 LAB
BACTERIA UR CULT: NORMAL
BACTERIA UR CULT: NORMAL

## 2023-08-07 ENCOUNTER — HOSPITAL ENCOUNTER (OUTPATIENT)
Dept: PHYSICAL THERAPY | Age: 68
Setting detail: THERAPIES SERIES
Discharge: HOME | End: 2023-08-07
Payer: MEDICARE

## 2023-08-07 ENCOUNTER — TELEPHONE (OUTPATIENT)
Dept: FAMILY MEDICINE CLINIC | Age: 68
End: 2023-08-07

## 2023-08-07 PROCEDURE — G0283 ELEC STIM OTHER THAN WOUND: HCPCS | Performed by: PHYSICAL THERAPIST

## 2023-08-07 PROCEDURE — 97140 MANUAL THERAPY 1/> REGIONS: CPT | Performed by: PHYSICAL THERAPIST

## 2023-08-07 PROCEDURE — 97110 THERAPEUTIC EXERCISES: CPT | Performed by: PHYSICAL THERAPIST

## 2023-08-07 NOTE — FLOWSHEET NOTE
[address] [place] [address]  Phone: [phone]  Fax [phone]        Date:  2023    Patient Name:  Tamiko Lundberg    :  1955  MRN: [id number]  Restrictions/Precautions:    Medical/Treatment Diagnosis Information:  Strain of rotator cuff of left shoulder [S46.012A]  Treatment diagnosis:Left shoulder pain U05.199   Insurance/Certification information:  PT Insurance Information: [facility]  Physician Information:  ESMER Helms  Has the plan of care been signed (Y/N):        []  Yes  [x]  No     Date of Patient follow up with Physician:       Is this a Progress Report:     []  Yes  [x]  No        If Yes:  Date Range for reporting period:  Beginning 23  Ending    Progress report will be due (10 Rx or 30 days whichever is less):        Recertification will be due (POC Duration  / 90 days whichever is less): 8 weeks      Visit # Insurance Allowable Auth Required   In-person 4  []  Yes []  No    Telehealth   []  Yes []  No    Total            Functional Scale: quick dash 19=18%    Date assessed:  23      Therapy Diagnosis/Practice Pattern:C      Number of Comorbidities:  []0     []1-2    [x]3+    Latex Allergy:  [x]NO      []YES  Preferred Language for Healthcare:   [x]English       []other:      Pain level:  7-10/10     SUBJECTIVE:  pt. Reports that she ended up going to the ER on Friday and was told she had a UTI,  she is scheduled to follow up with a urologist.  OBJECTIVE: See eval  Observation: tension L bicep, UT  Test measurements:      RESTRICTIONS/PRECAUTIONS:     Exercises/Interventions:     Access Code: [email or web address]  URL: [email or web address]  Date: 2023  Prepared by: Nilda Hernandez    Exercises  - Standing Single Arm Shoulder Flexion Towel Slide at Table Top  - 2 x daily - 7 x weekly - 1 sets - 10 reps  - Standing Bilateral Low Shoulder Row with Anchored Resistance  - 2

## 2023-08-07 NOTE — TELEPHONE ENCOUNTER
Lvm with patients , he notes she just left for therapy and will have her give us a call back when she arrives home

## 2023-08-07 NOTE — TELEPHONE ENCOUNTER
Patient stated that she ended up going to the ER on 8/4/23. They stated that she had a UTI. She was able to make appt with urology on 8/9/23. Patient stated that she is feeling much better and has a little bit of bleeding in her urine but it is dark.

## 2023-08-11 ENCOUNTER — HOSPITAL ENCOUNTER (OUTPATIENT)
Dept: PHYSICAL THERAPY | Age: 68
Setting detail: THERAPIES SERIES
Discharge: HOME | End: 2023-08-11
Payer: MEDICARE

## 2023-08-11 PROCEDURE — 97110 THERAPEUTIC EXERCISES: CPT | Performed by: PHYSICAL THERAPIST

## 2023-08-11 PROCEDURE — G0283 ELEC STIM OTHER THAN WOUND: HCPCS | Performed by: PHYSICAL THERAPIST

## 2023-08-11 PROCEDURE — 97140 MANUAL THERAPY 1/> REGIONS: CPT | Performed by: PHYSICAL THERAPIST

## 2023-08-11 NOTE — FLOWSHEET NOTE
[address] [place] [address] and [address]  Phone: [phone]  Fax [phone]        Date:  2023    Patient Name:  Min Rivera    :  1955  MRN: [id number]  Restrictions/Precautions:    Medical/Treatment Diagnosis Information:  Strain of rotator cuff of left shoulder [S46.012A]  Treatment diagnosis:Left shoulder pain O33.484   Insurance/Certification information:  PT Insurance Information: [facility]  Physician Information:  ESMER Renee  Has the plan of care been signed (Y/N):        []  Yes  [x]  No     Date of Patient follow up with Physician:       Is this a Progress Report:     []  Yes  [x]  No        If Yes:  Date Range for reporting period:  Beginning 23  Ending    Progress report will be due (10 Rx or 30 days whichever is less):        Recertification will be due (POC Duration  / 90 days whichever is less): 8 weeks      Visit # Insurance Allowable Auth Required   In-person 5  []  Yes []  No    Telehealth   []  Yes []  No    Total            Functional Scale: quick dash 19=18%    Date assessed:  23      Therapy Diagnosis/Practice Pattern:C      Number of Comorbidities:  []0     []1-2    [x]3+    Latex Allergy:  [x]NO      []YES  Preferred Language for Healthcare:   [x]English       []other:      Pain level:  7-10/10     SUBJECTIVE:  pt. Reports that she is sore in the shoulder and L neck today. OBJECTIVE: See eval  Observation: tension L bicep, UT  Test measurements:      RESTRICTIONS/PRECAUTIONS:     Exercises/Interventions:     Access Code: [email or web address]  URL: [email or web address]. [email or web address]  Date: 2023  Prepared by: Jalil Jones    Exercises  - Standing Single Arm Shoulder Flexion Towel Slide at Table Top  - 2 x daily - 7 x weekly - 1 sets - 10 reps  - Standing Bilateral Low Shoulder Row with Anchored Resistance  - 2 x daily - 7 x weekly - 2 sets - 10 reps  - Shoulder External

## 2023-08-14 ENCOUNTER — HOSPITAL ENCOUNTER (OUTPATIENT)
Dept: PHYSICAL THERAPY | Age: 68
Setting detail: THERAPIES SERIES
Discharge: HOME | End: 2023-08-14
Payer: MEDICARE

## 2023-08-14 PROCEDURE — 97140 MANUAL THERAPY 1/> REGIONS: CPT | Performed by: PHYSICAL THERAPIST

## 2023-08-14 PROCEDURE — 97110 THERAPEUTIC EXERCISES: CPT | Performed by: PHYSICAL THERAPIST

## 2023-08-14 PROCEDURE — G0283 ELEC STIM OTHER THAN WOUND: HCPCS | Performed by: PHYSICAL THERAPIST

## 2023-08-14 NOTE — FLOWSHEET NOTE
implemented, too soon to assess goals progression  [] Other:     ASSESSMENT: pt. With ability to progress to standing band ex. Today without issue, other than cues for form. She still has some end range capsular end feel with post GHJ glide, but loosens up with reps. Less tension noted in the bicep. Pt. Cont. To be limited with use of L UE for lifting, dressing, reaching and sleeping. Pt requires skilled intervention to restore ROM, strength, functional endurance and balance in order to perform ADLs without significant symptoms or limitations. Overall Progression Towards Functional goals/ Treatment Progress Update:  [] Patient is progressing as expected towards functional goals listed. [] Progression is slowed due to complexities/Impairments listed. [] Progression has been slowed due to co-morbidities.   [x] Plan just implemented, too soon to assess goals progression <30days   [] Goals require adjustment due to lack of progress  [] Patient is not progressing as expected and requires additional follow up with physician  [] Other    Prognosis for POC: [x] Good [] Fair  [] Poor      Patient requires continued skilled intervention: [x] Yes  [] No    Treatment/Activity Tolerance:  [x] Patient able to complete treatment  [] Patient limited by fatigue  [] Patient limited by pain    [] Patient limited by other medical complications  [] Other:         Return to Play: (if applicable)   []  Stage 1: Intro to Strength   []  Stage 2: Return to Run and Strength   []  Stage 3: Return to Jump and Strength   []  Stage 4: Dynamic Strength and Agility   []  Stage 5: Sport Specific Training     []  Ready to Return to Play, Meets All Above Stages   []  Not Ready for Return to Sports   Comments:                               PLAN: See eval  [x] Continue per plan of care [] Alter current plan (see comments above)  [] Plan of care initiated [] Hold pending MD visit [] Discharge      Electronically signed by:  Feng Song

## 2023-08-15 ENCOUNTER — HOSPITAL ENCOUNTER (OUTPATIENT)
Dept: ULTRASOUND IMAGING | Age: 68
Discharge: HOME | End: 2023-08-15
Attending: UROLOGY
Payer: MEDICARE

## 2023-08-15 DIAGNOSIS — E11.9 TYPE 2 DIABETES MELLITUS WITHOUT COMPLICATION, UNSPECIFIED WHETHER LONG TERM INSULIN USE (HCC): ICD-10-CM

## 2023-08-15 PROCEDURE — 76770 US EXAM ABDO BACK WALL COMP: CPT

## 2023-08-18 ENCOUNTER — HOSPITAL ENCOUNTER (OUTPATIENT)
Dept: PHYSICAL THERAPY | Age: 68
Setting detail: THERAPIES SERIES
Discharge: HOME | End: 2023-08-18
Payer: MEDICARE

## 2023-08-18 PROCEDURE — 97140 MANUAL THERAPY 1/> REGIONS: CPT | Performed by: PHYSICAL THERAPIST

## 2023-08-18 PROCEDURE — 97110 THERAPEUTIC EXERCISES: CPT | Performed by: PHYSICAL THERAPIST

## 2023-08-18 PROCEDURE — G0283 ELEC STIM OTHER THAN WOUND: HCPCS | Performed by: PHYSICAL THERAPIST

## 2023-08-21 ENCOUNTER — HOSPITAL ENCOUNTER (OUTPATIENT)
Dept: PHYSICAL THERAPY | Age: 68
Setting detail: THERAPIES SERIES
Discharge: HOME | End: 2023-08-21
Payer: MEDICARE

## 2023-08-21 PROCEDURE — G0283 ELEC STIM OTHER THAN WOUND: HCPCS | Performed by: PHYSICAL THERAPIST

## 2023-08-21 PROCEDURE — 97140 MANUAL THERAPY 1/> REGIONS: CPT | Performed by: PHYSICAL THERAPIST

## 2023-08-21 NOTE — FLOWSHEET NOTE
Hold pending MD visit [] Discharge      Electronically signed by:  Fransisca Silvestre, PT, MSPT, OMT-C [id number]      Note: If patient does not return for scheduled/ recommended follow up visits, this note will serve as a discharge from care along with most recent update on progress.

## 2023-08-24 ENCOUNTER — TELEPHONE (OUTPATIENT)
Dept: INTERNAL MEDICINE CLINIC | Age: 68
End: 2023-08-24

## 2023-08-24 NOTE — TELEPHONE ENCOUNTER
Prior patient of Dr. Bhupinder Montgomery who recently established with a new physician called in regarding a test result that appears on her record from 2019. Patient had an HIV Screen completed in 2019 at a visit with Dr. Bhupinder Montgomery and initial reading came back reactive. Confirmatory testing was completed and was negative. Patient was made aware of negative results at that time and again today. She is unhappy with the initial results of reactive from the first screening being in her chart and wishes to have them removed. I advised patient that I was not aware of a way for them to be removed but I would forward message on to our practice manager to see if there was a way to do so. Patient voiced understanding and would like a call back from myself or PM after review.

## 2023-08-24 NOTE — TELEPHONE ENCOUNTER
Please let Ms. Abad  know that legally we cannot remove anything from a medical record. Make sure she understands that when an HIV test comes back positive there is immediately an addition albeit different test done to verify the result. The re-test was run on the same day as the positive result and it was of course negative. One of the reasons that this is standard protocol is because false positives are always possible (with any test.)    Though we cannot remove the positive test any future or current MD understands the protocols and would immediately know that it was a false positive. Both test were done on the same day, utilizing the same sample and resulted just 4 hours apart. Another thing to note is that most PCPs follow the the CDC guidelines for HIV testing and would know that that is why she was tested in the first place. If you have any difficulty reassuring her that it is nothing to worry about or feel \"strange\" about. Please let me know if you feel that she needs me to call as well, after you speak with her.

## 2023-08-24 NOTE — TELEPHONE ENCOUNTER
Spoke with patient. Advised her of below message from our practice manager. Patient is still frustrated that it still  appears on her record but voiced understanding of the message and was appreciative of the call.

## 2023-08-25 ENCOUNTER — HOSPITAL ENCOUNTER (OUTPATIENT)
Dept: PHYSICAL THERAPY | Age: 68
Setting detail: THERAPIES SERIES
Discharge: HOME | End: 2023-08-25
Payer: MEDICARE

## 2023-08-25 PROCEDURE — 97110 THERAPEUTIC EXERCISES: CPT | Performed by: PHYSICAL THERAPIST

## 2023-08-25 PROCEDURE — 97112 NEUROMUSCULAR REEDUCATION: CPT | Performed by: PHYSICAL THERAPIST

## 2023-08-25 PROCEDURE — 97140 MANUAL THERAPY 1/> REGIONS: CPT | Performed by: PHYSICAL THERAPIST

## 2023-08-25 PROCEDURE — G0283 ELEC STIM OTHER THAN WOUND: HCPCS | Performed by: PHYSICAL THERAPIST

## 2023-08-25 NOTE — FLOWSHEET NOTE
expected towards functional goals listed. [x] Progression is slowed due to complexities listed. [] Progression has been slowed due to co-morbidities. [] Plan just implemented, too soon to assess goals progression  [] Other:     ASSESSMENT: pt. With improving shoulder mobility and ROM still feeling pain with use, and has been limited with progression of strength ex. Seems to have a cervical component and tolerates ex. Supine better than upright      Pt. Cont. To be limited with use of L UE for lifting, dressing, reaching and sleeping. Pt requires skilled intervention to restore ROM, strength, functional endurance and balance in order to perform ADLs without significant symptoms or limitations. Overall Progression Towards Functional goals/ Treatment Progress Update:  [] Patient is progressing as expected towards functional goals listed. [] Progression is slowed due to complexities/Impairments listed. [x] Progression has been slowed due to co-morbidities. [] Plan just implemented, too soon to assess goals progression <30days   [] Goals require adjustment due to lack of progress  [] Patient is not progressing as expected and requires additional follow up with physician  [] Other    Prognosis for POC: [x] Good [] Fair  [] Poor      Patient requires continued skilled intervention: [x] Yes  [] No    Treatment/Activity Tolerance:  [x] Patient able to complete treatment  [] Patient limited by fatigue  [] Patient limited by pain    [] Patient limited by other medical complications  [] Other:         Return to Play: (if applicable)   []  Stage 1: Intro to Strength   []  Stage 2: Return to Run and Strength   []  Stage 3: Return to Jump and Strength   []  Stage 4: Dynamic Strength and Agility   []  Stage 5: Sport Specific Training     []  Ready to Return to Play, Meets All Above Stages   []  Not Ready for Return to Sports   Comments:                               PLAN: Cont.  With PT progression of strength/scap

## 2023-08-28 ENCOUNTER — HOSPITAL ENCOUNTER (OUTPATIENT)
Dept: PHYSICAL THERAPY | Age: 68
Setting detail: THERAPIES SERIES
Discharge: HOME | End: 2023-08-28
Payer: MEDICARE

## 2023-08-28 NOTE — FLOWSHEET NOTE
[address] [facility], [address] [place] [address]  [address] [facility], [address] [place] [address], [address] [place] [address]        Physical Therapy  Cancellation/No-show Note  Patient Name:  Fe Sam  :  1955   Date:  2023  Cancelled visits to date: 1  No-shows to date: 0    For today's appointment patient:  [x]  Cancelled  []  Rescheduled appointment  []  No-show     Reason given by patient:  []  Patient ill  []  Conflicting appointment  []  No transportation    []  Conflict with work  []  No reason given  [x]  Other:  pt. Thought that PT cancelled her appt. For today, but was mistaken, actually needed to move Friday appt.    Comments:      Electronically signed by:  Josr Mcgregor PT, MSPT, OMT-C [id number]

## 2023-08-29 ENCOUNTER — OFFICE VISIT (OUTPATIENT)
Dept: INTERNAL MEDICINE CLINIC | Age: 68
End: 2023-08-29

## 2023-08-29 VITALS
RESPIRATION RATE: 14 BRPM | BODY MASS INDEX: 29.51 KG/M2 | SYSTOLIC BLOOD PRESSURE: 140 MMHG | HEART RATE: 64 BPM | HEIGHT: 67 IN | DIASTOLIC BLOOD PRESSURE: 80 MMHG | WEIGHT: 188 LBS

## 2023-08-29 DIAGNOSIS — N18.31 STAGE 3A CHRONIC KIDNEY DISEASE (HCC): ICD-10-CM

## 2023-08-29 DIAGNOSIS — Z01.818 PRE-OP EXAM: Primary | ICD-10-CM

## 2023-08-29 DIAGNOSIS — E78.00 HYPERCHOLESTEREMIA: ICD-10-CM

## 2023-08-29 PROBLEM — E11.9 TYPE 2 DIABETES MELLITUS (HCC): Status: RESOLVED | Noted: 2023-07-25 | Resolved: 2023-08-29

## 2023-08-29 PROCEDURE — 1036F TOBACCO NON-USER: CPT | Performed by: NURSE PRACTITIONER

## 2023-08-29 PROCEDURE — 1090F PRES/ABSN URINE INCON ASSESS: CPT | Performed by: NURSE PRACTITIONER

## 2023-08-29 PROCEDURE — 3017F COLORECTAL CA SCREEN DOC REV: CPT | Performed by: NURSE PRACTITIONER

## 2023-08-29 PROCEDURE — 1123F ACP DISCUSS/DSCN MKR DOCD: CPT | Performed by: NURSE PRACTITIONER

## 2023-08-29 PROCEDURE — G8427 DOCREV CUR MEDS BY ELIG CLIN: HCPCS | Performed by: NURSE PRACTITIONER

## 2023-08-29 PROCEDURE — 99205 OFFICE O/P NEW HI 60 MIN: CPT | Performed by: NURSE PRACTITIONER

## 2023-08-29 PROCEDURE — G8417 CALC BMI ABV UP PARAM F/U: HCPCS | Performed by: NURSE PRACTITIONER

## 2023-08-29 PROCEDURE — G8399 PT W/DXA RESULTS DOCUMENT: HCPCS | Performed by: NURSE PRACTITIONER

## 2023-08-29 NOTE — PROGRESS NOTES
Subjective:      Michelle Ramon is a 80 y.o. female who presents to the office today for a preoperative consultation at the request of surgeon Dr. Milagros Martinez who plans on performing CYSTOSCOPY, [address], [address] [facility],  [address]. Planned anesthesia is General and MAC. Past Medical History:   Diagnosis Date    Chronic renal disease, stage III [facility]) [421468] 07/25/2023    DJD (degenerative joint disease) of knee     GERD (gastroesophageal reflux disease)     Hypercholesteremia 04/15/2021    Osteopenia     Postmenopausal HRT (hormone replacement therapy)      Patient Active Problem List    Diagnosis Date Noted    Raynaud's phenomenon without gangrene 07/26/2023    Chronic renal disease, stage III [facility]) [[id number]] 07/25/2023    Severe obesity (BMI 35.0-39. 9) with comorbidity ([address]) 04/17/2023    Hypercholesteremia 04/15/2021    Risk for coronary artery disease less than 10% in next 10 years 04/15/2021    Chronic bilateral low back pain with right-sided sciatica 05/31/2017    Osteopenia with high risk of fracture      Past Surgical History:   Procedure Laterality Date    COLONOSCOPY  01/08/2008    Dr. Mckee Has      ectopic pregnancy at age 17     Family History   Problem Relation Age of Onset    Cancer Mother     Depression Mother         Listed with Anxiety    Breast Cancer Mother         Late 52's    Alcohol Abuse Mother     Cancer Father     Lung Cancer Father     Depression Father         Listed with Anxiety    Alcohol Abuse Father     High Cholesterol Sister     Asthma Sister     Alcohol Abuse Sister     High Cholesterol Brother     Stroke Brother     Other Brother         Carotid Stenosis    High Blood Pressure Brother     Stroke Maternal Uncle     High Cholesterol Maternal Grandmother     High Blood Pressure Maternal Grandmother     Heart Disease Maternal Grandmother     High Cholesterol Maternal Grandfather     Heart Disease Maternal Grandfather

## 2023-08-30 ENCOUNTER — HOSPITAL ENCOUNTER (OUTPATIENT)
Dept: PHYSICAL THERAPY | Age: 68
Setting detail: THERAPIES SERIES
Discharge: HOME | End: 2023-08-30
Payer: MEDICARE

## 2023-08-30 LAB
ALBUMIN SERPL-MCNC: 4.8 G/DL (ref 3.4–5)
ALBUMIN/GLOB SERPL: 1.8 {RATIO} (ref 1.1–2.2)
ALP SERPL-CCNC: 78 U/L (ref 40–129)
ALT SERPL-CCNC: 20 U/L (ref 10–40)
ANION GAP SERPL CALCULATED.3IONS-SCNC: 15 MMOL/L (ref 3–16)
AST SERPL-CCNC: 19 U/L (ref 15–37)
BILIRUB SERPL-MCNC: 0.4 MG/DL (ref 0–1)
BUN SERPL-MCNC: 16 MG/DL (ref 7–20)
CALCIUM SERPL-MCNC: 9.8 MG/DL (ref 8.3–10.6)
CHLORIDE SERPL-SCNC: 103 MMOL/L (ref 99–110)
CO2 SERPL-SCNC: 23 MMOL/L (ref 21–32)
CREAT SERPL-MCNC: 0.9 MG/DL (ref 0.6–1.2)
GFR SERPLBLD CREATININE-BSD FMLA CKD-EPI: >60 ML/MIN/{1.73_M2}
GLUCOSE SERPL-MCNC: 101 MG/DL (ref 70–99)
POTASSIUM SERPL-SCNC: 4.6 MMOL/L (ref 3.5–5.1)
PROT SERPL-MCNC: 7.4 G/DL (ref 6.4–8.2)
SODIUM SERPL-SCNC: 141 MMOL/L (ref 136–145)

## 2023-08-30 PROCEDURE — G0283 ELEC STIM OTHER THAN WOUND: HCPCS | Performed by: PHYSICAL THERAPIST

## 2023-08-30 PROCEDURE — 97110 THERAPEUTIC EXERCISES: CPT | Performed by: PHYSICAL THERAPIST

## 2023-08-30 PROCEDURE — 97140 MANUAL THERAPY 1/> REGIONS: CPT | Performed by: PHYSICAL THERAPIST

## 2023-08-30 NOTE — FLOWSHEET NOTE
Bilateral Low Shoulder Row with Anchored Resistance  - 2 x daily - 7 x weekly - 2 sets - 10 reps  - Shoulder External Rotation and Scapular Retraction  - 2 x daily - 7 x weekly - 2 sets - 10 reps  - Seated Upper Trapezius Stretch  - 2 x daily - 7 x weekly - 1 sets - 5 reps - 10 hold  - Supine Passive Cervical Retraction  - 2 x daily - 7 x weekly - 2 sets - 10 reps    Therapeutic Ex (46078) Sets/sec Reps Notes/CUES   Pulleys  With HP   Cane press Cane flexion Finisher push/pull, ladders, circles,45's 3#   Supine TB ext B green   No $-supine 2 10 yellow   Supine hoz abd  2 10 yellow   TB rows red   TB ER red   TB IR red   UT stretch manual   Supine chin tuck manual   Supine chest press/flexion dnd2 10 ea 1#   Supine serratus punch 2 10    Supine RS at 90 u/d,s/s 2 10 ea 0#         Pt edu re:findings, HEP, progression, avoiding painful activity, use of ice 8'                 Manual Intervention (99837)      IASTM L bicep/lat delt, IASTM L UT/scalenes/LS 8'     Gr II mobs post and inf glide, PROM L shoulder 8'     Manual cervical traction 2'     L/B upper rib mobs supine 8'     SOR  4'     Pec release           NMR re-education (15943)   CUES NEEDED               Supine chin tucks passive OP review     Self rib mob with belt Ed for HEP                                   Therapeutic Activity (04632)                                                Therapeutic Exercise and NMR EXR  [x] (46282) Provided verbal/tactile cueing for activities related to strengthening, flexibility, endurance, ROM  for improvements in scapular, scapulothoracic and UE control with self care, reaching, carrying, lifting, house/yardwork, driving/computer work.    [] (64769) Provided verbal/tactile cueing for activities related to improving balance, coordination, kinesthetic sense, posture, motor skill, proprioception  to assist with  scapular, scapulothoracic and UE control with self care, reaching, carrying, lifting, house/yardwork, driving/computer

## 2023-08-30 NOTE — PROGRESS NOTES

## 2023-08-31 ENCOUNTER — TELEPHONE (OUTPATIENT)
Dept: INTERNAL MEDICINE CLINIC | Age: 68
End: 2023-08-31

## 2023-08-31 NOTE — TELEPHONE ENCOUNTER
----- Message from CHAPARRITA Sim CNP sent at 8/31/2023  1:17 PM EDT -----  I can after I have EKG results back    ----- Message -----  From: Glory Esteban  Sent: 8/30/2023   1:44 PM EDT  To: CHAPARRITA Sim CNPings from scheduling, requesting you to sign H&P for patient for upcoming surgery.

## 2023-09-01 ENCOUNTER — APPOINTMENT (OUTPATIENT)
Dept: PHYSICAL THERAPY | Age: 68
End: 2023-09-01
Payer: MEDICARE

## 2023-09-05 ENCOUNTER — HOSPITAL ENCOUNTER (OUTPATIENT)
Age: 68
Discharge: HOME | End: 2023-09-05
Payer: MEDICARE

## 2023-09-05 ENCOUNTER — HOSPITAL ENCOUNTER (OUTPATIENT)
Dept: PHYSICAL THERAPY | Age: 68
Setting detail: THERAPIES SERIES
Discharge: HOME | End: 2023-09-05
Payer: MEDICARE

## 2023-09-05 ENCOUNTER — TELEPHONE (OUTPATIENT)
Dept: INTERNAL MEDICINE CLINIC | Age: 68
End: 2023-09-05

## 2023-09-05 ENCOUNTER — ANESTHESIA EVENT (OUTPATIENT)
Dept: OPERATING ROOM | Age: 68
End: 2023-09-05
Payer: MEDICARE

## 2023-09-05 LAB
EKG ATRIAL RATE: 75 BPM
EKG DIAGNOSIS: NORMAL
EKG P AXIS: 75 DEGREES
EKG P-R INTERVAL: 150 MS
EKG Q-T INTERVAL: 374 MS
EKG QRS DURATION: 94 MS
EKG QTC CALCULATION (BAZETT): 417 MS
EKG R AXIS: -5 DEGREES
EKG T AXIS: 65 DEGREES
EKG VENTRICULAR RATE: 75 BPM

## 2023-09-05 PROCEDURE — 93010 ELECTROCARDIOGRAM REPORT: CPT | Performed by: INTERNAL MEDICINE

## 2023-09-05 PROCEDURE — 97110 THERAPEUTIC EXERCISES: CPT | Performed by: PHYSICAL THERAPIST

## 2023-09-05 PROCEDURE — 93005 ELECTROCARDIOGRAM TRACING: CPT | Performed by: INTERNAL MEDICINE

## 2023-09-05 PROCEDURE — G0283 ELEC STIM OTHER THAN WOUND: HCPCS | Performed by: PHYSICAL THERAPIST

## 2023-09-05 PROCEDURE — 97140 MANUAL THERAPY 1/> REGIONS: CPT | Performed by: PHYSICAL THERAPIST

## 2023-09-05 NOTE — TELEPHONE ENCOUNTER
----- Message from Tyler Vogel APRN - CNP sent at 9/5/2023 10:59 AM EDT -----  Contact: Kasia María [phone]  Patient is to have EKG prior to surgery or I cannot sign off on H&P    ----- Message -----  From: Penny Frank  Sent: 9/5/2023   9:37 AM EDT  To: Tyler Vogel, [address] [place] [address] Surgery Department still needs your signature on H & P for patients surgery tomorrow. We let them know the patient was suppose to have an EKG completed before you signed off. We have left a message for patient, and surgery department was going to try to reach patient also.   Wanted to make you aware

## 2023-09-05 NOTE — PROGRESS NOTES
H&P not signed. Called Dr Graham Jesus office. Stated pt was supposed to have an ekg done. Not completed as of today. Called pt to notify her of the same. Left message on home phone and left message with  to have pt call [phone].

## 2023-09-05 NOTE — PROGRESS NOTES
Left message with Ivan Singh at Dr Aidan Augustine office regarding not being cleared for surgery on 9/6/23 until pt completes EKG.

## 2023-09-05 NOTE — FLOWSHEET NOTE
[address] [place] [address], [address] [place] [address]  Phone: [phone]  Fax [phone]        Date:  2023    Patient Name:  Jelly Alves    :  1955  MRN: [id number]  Restrictions/Precautions:    Medical/Treatment Diagnosis Information:  Strain of rotator cuff of left shoulder [S46.012A]  Treatment diagnosis:Left shoulder pain O72.417   Insurance/Certification information:  PT Insurance Information: [facility]  Physician Information:  ESMER Freeman  Has the plan of care been signed (Y/N):        []  Yes  [x]  No     Date of Patient follow up with Physician:       Is this a Progress Report:     []  Yes  [x]  No        If Yes:  Date Range for reporting period:  Beginning 23  Ending 23    Progress report will be due (10 Rx or 30 days whichever is less): 69       Recertification will be due (POC Duration  / 90 days whichever is less): 8 weeks 23     Visit # Insurance Allowable Auth Required   In-person 11  []  Yes []  No    Telehealth   []  Yes []  No    Total            Functional Scale: quick dash 19=18%    Date assessed:  23      Therapy Diagnosis/Practice Pattern:C      Number of Comorbidities:  []0     []1-2    [x]3+    Latex Allergy:  [x]NO      []YES  Preferred Language for Healthcare:   [x]English       []other:      Pain level:  7-10/10     SUBJECTIVE:  pt. Reports that she had a really bad week end with pain and trying to paint the barn using R UE, the nech and L shoulder are hurting and having pain down the L UE into the forearm. OBJECTIVE: See eval  Observation: TTP over L 1st rib, restricted, TTP L rhomboids and LS  Test measurements: ER 3-/5  RESTRICTIONS/PRECAUTIONS:     Exercises/Interventions:     Access Code: [email or web address]  URL: [email or web address]  Date: 2023  Prepared by: Edwin Osorio    Exercises  - Standing Single Arm Shoulder Flexion Towel Slide at Table Top  - 2 x

## 2023-09-06 ENCOUNTER — ANESTHESIA (OUTPATIENT)
Dept: OPERATING ROOM | Age: 68
End: 2023-09-06
Payer: MEDICARE

## 2023-09-06 ENCOUNTER — APPOINTMENT (OUTPATIENT)
Dept: GENERAL RADIOLOGY | Age: 68
End: 2023-09-06
Attending: UROLOGY
Payer: MEDICARE

## 2023-09-06 ENCOUNTER — HOSPITAL ENCOUNTER (OUTPATIENT)
Age: 68
Setting detail: OUTPATIENT SURGERY
Discharge: HOME | End: 2023-09-06
Attending: UROLOGY | Admitting: UROLOGY
Payer: MEDICARE

## 2023-09-06 VITALS
HEART RATE: 57 BPM | OXYGEN SATURATION: 97 % | SYSTOLIC BLOOD PRESSURE: 135 MMHG | WEIGHT: 188 LBS | HEIGHT: 67 IN | TEMPERATURE: 98.2 F | BODY MASS INDEX: 29.51 KG/M2 | DIASTOLIC BLOOD PRESSURE: 78 MMHG | RESPIRATION RATE: 13 BRPM

## 2023-09-06 PROCEDURE — 7100000010 HC PHASE II RECOVERY - FIRST 15 MIN: Performed by: UROLOGY

## 2023-09-06 PROCEDURE — 6370000000 HC RX 637 (ALT 250 FOR IP): Performed by: UROLOGY

## 2023-09-06 PROCEDURE — 2709999900 HC NON-CHARGEABLE SUPPLY: Performed by: UROLOGY

## 2023-09-06 PROCEDURE — 2580000003 HC RX 258: Performed by: ANESTHESIOLOGY

## 2023-09-06 PROCEDURE — 2500000003 HC RX 250 WO HCPCS: Performed by: NURSE ANESTHETIST, CERTIFIED REGISTERED

## 2023-09-06 PROCEDURE — 74420 UROGRAPHY RTRGR +-KUB: CPT

## 2023-09-06 PROCEDURE — 3600000014 HC SURGERY LEVEL 4 ADDTL 15MIN: Performed by: UROLOGY

## 2023-09-06 PROCEDURE — 7100000011 HC PHASE II RECOVERY - ADDTL 15 MIN: Performed by: UROLOGY

## 2023-09-06 PROCEDURE — 7100000001 HC PACU RECOVERY - ADDTL 15 MIN: Performed by: UROLOGY

## 2023-09-06 PROCEDURE — 6360000002 HC RX W HCPCS: Performed by: NURSE ANESTHETIST, CERTIFIED REGISTERED

## 2023-09-06 PROCEDURE — 2580000003 HC RX 258: Performed by: UROLOGY

## 2023-09-06 PROCEDURE — 7100000000 HC PACU RECOVERY - FIRST 15 MIN: Performed by: UROLOGY

## 2023-09-06 PROCEDURE — 6360000004 HC RX CONTRAST MEDICATION: Performed by: UROLOGY

## 2023-09-06 PROCEDURE — 3700000000 HC ANESTHESIA ATTENDED CARE: Performed by: UROLOGY

## 2023-09-06 PROCEDURE — 3600000004 HC SURGERY LEVEL 4 BASE: Performed by: UROLOGY

## 2023-09-06 PROCEDURE — C1769 GUIDE WIRE: HCPCS | Performed by: UROLOGY

## 2023-09-06 PROCEDURE — C1758 CATHETER, URETERAL: HCPCS | Performed by: UROLOGY

## 2023-09-06 PROCEDURE — 6360000002 HC RX W HCPCS: Performed by: UROLOGY

## 2023-09-06 PROCEDURE — 3700000001 HC ADD 15 MINUTES (ANESTHESIA): Performed by: UROLOGY

## 2023-09-06 RX ORDER — DIPHENHYDRAMINE HYDROCHLORIDE 50 MG/ML
12.5 INJECTION INTRAMUSCULAR; INTRAVENOUS
Status: CANCELLED | OUTPATIENT
Start: 2023-09-06 | End: 2023-09-07

## 2023-09-06 RX ORDER — SODIUM CHLORIDE 0.9 % (FLUSH) 0.9 %
5-40 SYRINGE (ML) INJECTION EVERY 12 HOURS SCHEDULED
Status: CANCELLED | OUTPATIENT
Start: 2023-09-06

## 2023-09-06 RX ORDER — ONDANSETRON 2 MG/ML
4 INJECTION INTRAMUSCULAR; INTRAVENOUS
Status: CANCELLED | OUTPATIENT
Start: 2023-09-06 | End: 2023-09-07

## 2023-09-06 RX ORDER — OXYCODONE HYDROCHLORIDE 5 MG/1
5 TABLET ORAL PRN
Status: CANCELLED | OUTPATIENT
Start: 2023-09-06 | End: 2023-09-06

## 2023-09-06 RX ORDER — SODIUM CHLORIDE 0.9 % (FLUSH) 0.9 %
5-40 SYRINGE (ML) INJECTION PRN
Status: DISCONTINUED | OUTPATIENT
Start: 2023-09-06 | End: 2023-09-06 | Stop reason: HOSPADM

## 2023-09-06 RX ORDER — SODIUM CHLORIDE 9 MG/ML
INJECTION, SOLUTION INTRAVENOUS PRN
Status: CANCELLED | OUTPATIENT
Start: 2023-09-06

## 2023-09-06 RX ORDER — SODIUM CHLORIDE 0.9 % (FLUSH) 0.9 %
5-40 SYRINGE (ML) INJECTION PRN
Status: CANCELLED | OUTPATIENT
Start: 2023-09-06

## 2023-09-06 RX ORDER — SULFAMETHOXAZOLE AND TRIMETHOPRIM 400; 80 MG/1; MG/1
1 TABLET ORAL 2 TIMES DAILY
Qty: 8 TABLET | Refills: 0 | Status: SHIPPED | OUTPATIENT
Start: 2023-09-06 | End: 2023-09-10

## 2023-09-06 RX ORDER — PHENAZOPYRIDINE HYDROCHLORIDE 200 MG/1
200 TABLET, FILM COATED ORAL 3 TIMES DAILY PRN
Qty: 15 TABLET | Refills: 0 | Status: SHIPPED | OUTPATIENT
Start: 2023-09-06 | End: 2023-09-11

## 2023-09-06 RX ORDER — MAGNESIUM HYDROXIDE 1200 MG/15ML
LIQUID ORAL PRN
Status: DISCONTINUED | OUTPATIENT
Start: 2023-09-06 | End: 2023-09-06 | Stop reason: ALTCHOICE

## 2023-09-06 RX ORDER — LIDOCAINE HYDROCHLORIDE 10 MG/ML
1 INJECTION, SOLUTION EPIDURAL; INFILTRATION; INTRACAUDAL; PERINEURAL
Status: DISCONTINUED | OUTPATIENT
Start: 2023-09-06 | End: 2023-09-06 | Stop reason: HOSPADM

## 2023-09-06 RX ORDER — LIDOCAINE HYDROCHLORIDE 20 MG/ML
INJECTION, SOLUTION INFILTRATION; PERINEURAL PRN
Status: DISCONTINUED | OUTPATIENT
Start: 2023-09-06 | End: 2023-09-06 | Stop reason: SDUPTHER

## 2023-09-06 RX ORDER — SODIUM CHLORIDE 0.9 % (FLUSH) 0.9 %
5-40 SYRINGE (ML) INJECTION EVERY 12 HOURS SCHEDULED
Status: DISCONTINUED | OUTPATIENT
Start: 2023-09-06 | End: 2023-09-06 | Stop reason: HOSPADM

## 2023-09-06 RX ORDER — LABETALOL HYDROCHLORIDE 5 MG/ML
10 INJECTION, SOLUTION INTRAVENOUS
Status: CANCELLED | OUTPATIENT
Start: 2023-09-06

## 2023-09-06 RX ORDER — OXYCODONE HYDROCHLORIDE 5 MG/1
10 TABLET ORAL PRN
Status: CANCELLED | OUTPATIENT
Start: 2023-09-06 | End: 2023-09-06

## 2023-09-06 RX ORDER — DEXAMETHASONE SODIUM PHOSPHATE 4 MG/ML
INJECTION, SOLUTION INTRA-ARTICULAR; INTRALESIONAL; INTRAMUSCULAR; INTRAVENOUS; SOFT TISSUE PRN
Status: DISCONTINUED | OUTPATIENT
Start: 2023-09-06 | End: 2023-09-06 | Stop reason: SDUPTHER

## 2023-09-06 RX ORDER — SODIUM CHLORIDE 9 MG/ML
INJECTION, SOLUTION INTRAVENOUS PRN
Status: DISCONTINUED | OUTPATIENT
Start: 2023-09-06 | End: 2023-09-06 | Stop reason: HOSPADM

## 2023-09-06 RX ORDER — FENTANYL CITRATE 50 UG/ML
INJECTION, SOLUTION INTRAMUSCULAR; INTRAVENOUS PRN
Status: DISCONTINUED | OUTPATIENT
Start: 2023-09-06 | End: 2023-09-06 | Stop reason: SDUPTHER

## 2023-09-06 RX ORDER — SODIUM CHLORIDE, SODIUM LACTATE, POTASSIUM CHLORIDE, CALCIUM CHLORIDE 600; 310; 30; 20 MG/100ML; MG/100ML; MG/100ML; MG/100ML
INJECTION, SOLUTION INTRAVENOUS CONTINUOUS
Status: DISCONTINUED | OUTPATIENT
Start: 2023-09-06 | End: 2023-09-06 | Stop reason: HOSPADM

## 2023-09-06 RX ORDER — MEPERIDINE HYDROCHLORIDE 25 MG/ML
12.5 INJECTION INTRAMUSCULAR; INTRAVENOUS; SUBCUTANEOUS EVERY 5 MIN PRN
Status: CANCELLED | OUTPATIENT
Start: 2023-09-06

## 2023-09-06 RX ORDER — LIDOCAINE HYDROCHLORIDE 20 MG/ML
JELLY TOPICAL PRN
Status: DISCONTINUED | OUTPATIENT
Start: 2023-09-06 | End: 2023-09-06 | Stop reason: ALTCHOICE

## 2023-09-06 RX ORDER — ONDANSETRON 2 MG/ML
INJECTION INTRAMUSCULAR; INTRAVENOUS PRN
Status: DISCONTINUED | OUTPATIENT
Start: 2023-09-06 | End: 2023-09-06 | Stop reason: SDUPTHER

## 2023-09-06 RX ORDER — PROPOFOL 10 MG/ML
INJECTION, EMULSION INTRAVENOUS PRN
Status: DISCONTINUED | OUTPATIENT
Start: 2023-09-06 | End: 2023-09-06 | Stop reason: SDUPTHER

## 2023-09-06 RX ADMIN — SODIUM CHLORIDE, POTASSIUM CHLORIDE, SODIUM LACTATE AND CALCIUM CHLORIDE: 600; 310; 30; 20 INJECTION, SOLUTION INTRAVENOUS at 13:55

## 2023-09-06 RX ADMIN — GENTAMICIN SULFATE 240 MG: 40 INJECTION, SOLUTION INTRAMUSCULAR; INTRAVENOUS at 14:00

## 2023-09-06 RX ADMIN — PROPOFOL 100 MG: 10 INJECTION, EMULSION INTRAVENOUS at 14:51

## 2023-09-06 RX ADMIN — ONDANSETRON HYDROCHLORIDE 4 MG: 2 INJECTION, SOLUTION INTRAMUSCULAR; INTRAVENOUS at 14:56

## 2023-09-06 RX ADMIN — PROPOFOL 200 MG: 10 INJECTION, EMULSION INTRAVENOUS at 14:48

## 2023-09-06 RX ADMIN — LIDOCAINE HYDROCHLORIDE 100 MG: 20 INJECTION, SOLUTION INFILTRATION; PERINEURAL at 14:48

## 2023-09-06 RX ADMIN — DEXAMETHASONE SODIUM PHOSPHATE 4 MG: 4 INJECTION, SOLUTION INTRAMUSCULAR; INTRAVENOUS at 14:56

## 2023-09-06 RX ADMIN — FENTANYL CITRATE 100 MCG: 50 INJECTION INTRAMUSCULAR; INTRAVENOUS at 14:55

## 2023-09-06 ASSESSMENT — PAIN - FUNCTIONAL ASSESSMENT: PAIN_FUNCTIONAL_ASSESSMENT: 0-10

## 2023-09-06 ASSESSMENT — PAIN DESCRIPTION - DESCRIPTORS: DESCRIPTORS: ACHING

## 2023-09-06 NOTE — PROGRESS NOTES
Patient admitted from OR to PACU. Bedside report received. Patient immediately hooked up to heart monitor. LMA removed. Fawn Barone RN

## 2023-09-06 NOTE — DISCHARGE INSTRUCTIONS
Patient to call Dr. Zuleima Ramirez office for a follow up appointment in 3-4 weeks. Office number to call is [phone]. ANESTHESIA DISCHARGE INSTRUCTIONS    You are under the influence of drugs- do not drink alcohol, drive a car, operate machinery(such as power tools, kitchen appliances, etc), sign legal documents, or make any important decisions for 24 hours (or while on pain medications). Children should not ride bikes or [place] or play on gym sets  for 24 hours after surgery. A responsible adult should be with you for 24 hours. Rest at home today- increase activity as tolerated. Progress slowly to a regular diet unless your physician has instructed you otherwise. Drink plenty of water. CALL YOUR DOCTOR IF YOU:  Have moderate to severe nausea or vomiting AND are unable to hold down fluids or prescribed medications. Have bright red bloody drainage from your dressing that won't stop oozing. Do not get relief with your pain medication    NORMAL (POSSIBLE) SIDE EFFECTS FROM ANESTHESIA:     Confusion, temporary memory loss, delayed reaction times in the first 24 hours  Lightheadedness, dizziness, difficulty focusing, blurred vision  Nausea/vomiting can happen  Shivering, feeling cold, sore throat, cough and muscle aches should stop within 24-48 hours  Trouble urinating - call your surgeon if it has been more than 8 hrs  Bruising or soreness at the IV site - call if it remains red, firm or there is drainage             FEMALES OF CHILDBEARING AGE WHO ARE TAKING BIRTH CONTROL PILLS:  You may have received a medication during your procedure that interferes with the   actions of birth control pills (Bridion or Emend). Use some other kind of birth control in addition to your pills, like a condom, for 1 month after your procedure to prevent unwanted pregnancy. The following instructions are to be followed if you have a known history or diagnosis of sleep apnea:   For all sleep apnea patients:  ? Sleep on

## 2023-09-06 NOTE — OP NOTE
[address] [facility] [address] [place]                 [address] [address]                                OPERATIVE REPORT    PATIENT NAME: Carmen Funes                   :        1955  MED REC NO:   [id number]                          ROOM:  ACCOUNT NO:   [de-identified]                           ADMIT DATE: 2023  PROVIDER:     Cyrus Núñez MD    DATE OF PROCEDURE:  2023    PREOPERATIVE DIAGNOSES:  1. Urethral syndrome, voiding difficulty. 2.  Hematuria. POSTOPERATIVE DIAGNOSES:  1. Urethral syndrome, voiding difficulty. 2.  Hematuria. OPERATION PERFORMED:  1. Cystoscopy with urethral dilation. 2.  Cystoscopy with bilateral retrograde pyelogram.    PRIMARY SURGEON:  Cyrus Núñez MD    ANESTHESIA:  General.    ESTIMATED BLOOD LOSS:  2 mL. OPERATIVE FINDINGS:  1.  Negative bilateral retrograde pyelogram.  2.  Negative preoperative renal ultrasound. 3.  Urethral stenosis. 4.  Mild prolapse of the bladder. 5.  No evidence for bladder cancer, tumors, or stones. HISTORY AND INDICATIONS:  This is a 81-year-old white female with a  history of obstructive and irritative voiding symptoms, but also  hematuria. There is question of upper tract disease as well as  evaluation of the bladder. For this reason, she has been scheduled for  both of these procedures. Preoperatively, she had undergone an  ultrasound showing no significant upper tract disease. The risks,  benefits, and expected outcomes of each procedure were discussed in  preoperative consultation. All questions were answered to her  satisfaction. PROCEDURE IN DETAIL:  After obtaining informed consent, the patient was  taken to the operative suite. She was given a general anesthetic and  placed on the operative table in a modified dorsal lithotomy position. Prepping and draping was done in sterile fashion.   Cystourethroscopy was  then performed with both 30 and 70 degree lenses. This revealed no  evidence of any bladder cancer, tumors, or stones. Both ureteral  orifices were orthotopic with clear efflux of urine. There was some  mild prolapse of the bladder and stenosis of the patient's urethra. Under fluoroscopic guidance, a 5-Honduran cone-tipped catheter was put in  each ureteral orifice and bilateral retrograde pyelograms were  performed. This showed no evidence of any filling defects, tumors, or  stones but there were previously noted kidney stones. The retrograde  studies were done in both retrograde and antegrade fashion. Once the  case had been filled to capacity, antegrade studies were noted and again  there was no evidence for any significant filling defect or points of  obstruction. Overall, the patient tolerated this part of the procedure  well. At this time, attention was directed towards her urethral  stenosis. Dilation was carried out using Wilberto sounds to 34-Honduran. Overall, she tolerated the procedures well. After finishing off the  dilation and drained her bladder, lidocaine jelly was instilled in the  urethra and she was taken back to the postop recovery room in stable  condition.         Abbey Ellis MD    D: 09/06/2023 17:42:19       T: 09/06/2023 17:45:52     [email or web address]  Job#: [id number]     Doc#: [id number]    CC:

## 2023-09-06 NOTE — PROGRESS NOTES
Patient discharged via wheelchair in stable condition with all belongings to private car. Tyler Davis RN

## 2023-09-06 NOTE — PROGRESS NOTES
Discharge instructions given to patient and patients . Both deny any questions at this time. Mic Manzanares RN

## 2023-09-06 NOTE — ANESTHESIA PRE PROCEDURE
benefits and alternatives of GA discussed with pt. Questions answered. Willing to proceed.)  Induction: intravenous. Anesthetic plan and risks discussed with patient.                         Melvi Erickson MD   9/6/2023

## 2023-09-06 NOTE — BRIEF OP NOTE
Brief Postoperative Note      Patient: Min Rivera  YOB: 1955  MRN: [id number]    Date of Procedure: 9/6/2023    Pre-Op Diagnosis Codes:     * Gross hematuria [R31.0]    Post-Op Diagnosis: Same       Procedure(s):  CYSTOSCOPY, BILATERAL RETROGRADE PYELOGRAM, POSSIBLE BLADDER BIOPSY    Surgeon(s):  Brad Hugo MD    Assistant:  * No surgical staff found *    Anesthesia: General    Estimated Blood Loss (mL): Minimal    Complications: None    Specimens:   * No specimens in log *    Implants:  * No implants in log *      Drains: * No LDAs found *    Findings: Urethral stenosis, no TCC tumors or stones, normal retrogrades      Electronically signed by Brad Hugo MD on 9/6/2023 at 3:08 PM

## 2023-09-06 NOTE — ANESTHESIA POSTPROCEDURE EVALUATION
Department of Anesthesiology  Postprocedure Note    Patient: Lilly Ritchie  MRN: [id number]  YOB: 1955  Date of evaluation: 9/6/2023      Procedure Summary     Date: 09/06/23 Room / Location: [address] [place] [address] / [facility]    Anesthesia Start: 5205 Anesthesia Stop: 1518    Procedure: CYSTOSCOPY, [address], (Bilateral: Bladder) Diagnosis:       Gross hematuria      (Gross hematuria [R31.0])    Surgeons: Adrianne Mccarthy MD Responsible Provider: Louisa Pittmna MD    Anesthesia Type: general ASA Status: 2          Anesthesia Type: No value filed. Mario Phase I: Mario Score: 9    Mario Phase II: Mario Score: 10      Anesthesia Post Evaluation    Patient location during evaluation: PACU  Patient participation: complete - patient participated  Level of consciousness: awake and alert  Airway patency: patent  Nausea & Vomiting: no nausea and no vomiting  Complications: no  Cardiovascular status: blood pressure returned to baseline  Respiratory status: acceptable  Hydration status: euvolemic  Comments: VSS on transfer to phase 2 recovery. No anesthetic complications.   Pain management: adequate

## 2023-09-08 ENCOUNTER — HOSPITAL ENCOUNTER (OUTPATIENT)
Dept: PHYSICAL THERAPY | Age: 68
Setting detail: THERAPIES SERIES
Discharge: HOME | End: 2023-09-08
Payer: MEDICARE

## 2023-09-08 PROCEDURE — 97110 THERAPEUTIC EXERCISES: CPT | Performed by: PHYSICAL THERAPIST

## 2023-09-08 PROCEDURE — 97140 MANUAL THERAPY 1/> REGIONS: CPT | Performed by: PHYSICAL THERAPIST

## 2023-09-08 PROCEDURE — G0283 ELEC STIM OTHER THAN WOUND: HCPCS | Performed by: PHYSICAL THERAPIST

## 2023-09-08 NOTE — FLOWSHEET NOTE
[address] [place] [address]  Phone: [phone]  Fax [phone]        Date:  2023    Patient Name:  Stacie Blanchard    :  1955  MRN: [id number]  Restrictions/Precautions:    Medical/Treatment Diagnosis Information:  Strain of rotator cuff of left shoulder [S46.012A]  Treatment diagnosis:Left shoulder pain P46.913   Insurance/Certification information:  PT Insurance Information: [facility]  Physician Information:  ESMER Darden  Has the plan of care been signed (Y/N):        []  Yes  [x]  No     Date of Patient follow up with Physician:       Is this a Progress Report:     []  Yes  [x]  No        If Yes:  Date Range for reporting period:  Beginning 23  Ending 23    Progress report will be due (10 Rx or 30 days whichever is less): 28       Recertification will be due (POC Duration  / 90 days whichever is less): 8 weeks 23     Visit # Insurance Allowable Auth Required   In-person 12  []  Yes []  No    Telehealth   []  Yes []  No    Total            Functional Scale: quick dash 19=18%    Date assessed:  23      Therapy Diagnosis/Practice Pattern:C      Number of Comorbidities:  []0     []1-2    [x]3+    Latex Allergy:  [x]NO      []YES  Preferred Language for Healthcare:   [x]English       []other:      Pain level:  7-10/10     SUBJECTIVE:  pt. Reports that she felt a little better after therapy last visit, but last night couldn't sleep, reporting pain in the L UE and neck/shoulder. OBJECTIVE: See eval  Observation:  TTP L  LS, dec PA mobility at CTJ/thoracic   Test measurements: ER 3-/5  RESTRICTIONS/PRECAUTIONS:     Exercises/Interventions:     Access Code: [email or web address]  URL: [email or web address]  Date: 2023  Prepared by: Carolyn Romo    Exercises  - Standing Single Arm Shoulder Flexion Towel Slide at Table Top  - 2 x daily - 7 x weekly - 1 sets - 10 reps  - Standing

## 2023-09-11 ENCOUNTER — HOSPITAL ENCOUNTER (OUTPATIENT)
Dept: PHYSICAL THERAPY | Age: 68
Setting detail: THERAPIES SERIES
Discharge: HOME | End: 2023-09-11
Payer: MEDICARE

## 2023-09-11 PROCEDURE — 97112 NEUROMUSCULAR REEDUCATION: CPT | Performed by: PHYSICAL THERAPIST

## 2023-09-11 PROCEDURE — 97110 THERAPEUTIC EXERCISES: CPT | Performed by: PHYSICAL THERAPIST

## 2023-09-11 PROCEDURE — 97140 MANUAL THERAPY 1/> REGIONS: CPT | Performed by: PHYSICAL THERAPIST

## 2023-09-11 PROCEDURE — G0283 ELEC STIM OTHER THAN WOUND: HCPCS | Performed by: PHYSICAL THERAPIST

## 2023-09-11 NOTE — FLOWSHEET NOTE
[address]  Phone: [phone]  Fax [phone]        Date:  2023    Patient Name:  Minh Cabrales    :  1955  MRN: [id number]  Restrictions/Precautions:    Medical/Treatment Diagnosis Information:  Strain of rotator cuff of left shoulder [S46.012A]  Treatment diagnosis:Left shoulder pain B16.419   Insurance/Certification information:  PT Insurance Information: [facility]  Physician Information:  ESMER Thomposn  Has the plan of care been signed (Y/N):        []  Yes  [x]  No     Date of Patient follow up with Physician:       Is this a Progress Report:     []  Yes  [x]  No        If Yes:  Date Range for reporting period:  Beginning 23  Ending 23    Progress report will be due (10 Rx or 30 days whichever is less): 39       Recertification will be due (POC Duration  / 90 days whichever is less): 8 weeks 23     Visit # Insurance Allowable Auth Required   In-person 13  []  Yes []  No    Telehealth   []  Yes []  No    Total            Functional Scale: quick dash 19=18%    Date assessed:  23      Therapy Diagnosis/Practice Pattern:C      Number of Comorbidities:  []0     []1-2    [x]3+    Latex Allergy:  [x]NO      []YES  Preferred Language for Healthcare:   [x]English       []other:      Pain level:  7-10/10     SUBJECTIVE:  pt. Reports that the pain is still in the posterior shoulder and upper arm. And is worse at night, but less pain down the arm. OBJECTIVE: See eval  Observation:  TTP L  LS, dec PA mobility at CTJ/thoracic   Test measurements: ER 3/5-3+/5  ROM supine:Flexion 150, ABd 155, ER @ 90 65, IR @90 68  AROM L shoulder F 132, ER T4, IR L2    RESTRICTIONS/PRECAUTIONS:     Exercises/Interventions:     Access Code: [email or web address]  URL: [email or web address]  Date: 2023  Prepared by: Armando Love    Exercises  - Standing Single Arm Shoulder

## 2023-09-15 ENCOUNTER — HOSPITAL ENCOUNTER (OUTPATIENT)
Dept: PHYSICAL THERAPY | Age: 68
Setting detail: THERAPIES SERIES
Discharge: HOME | End: 2023-09-15
Payer: MEDICARE

## 2023-09-15 PROCEDURE — G0283 ELEC STIM OTHER THAN WOUND: HCPCS | Performed by: PHYSICAL THERAPIST

## 2023-09-15 PROCEDURE — 97110 THERAPEUTIC EXERCISES: CPT | Performed by: PHYSICAL THERAPIST

## 2023-09-15 PROCEDURE — 97112 NEUROMUSCULAR REEDUCATION: CPT | Performed by: PHYSICAL THERAPIST

## 2023-09-15 PROCEDURE — 97140 MANUAL THERAPY 1/> REGIONS: CPT | Performed by: PHYSICAL THERAPIST

## 2023-09-15 NOTE — FLOWSHEET NOTE
control. Girth Left Right Post Vaso               [] ICD 10: R60.9 Edema unspecified  [] ICD 10: R22.3 Localized swelling of upper limb  [] ICD 10: R60.1 Generalized edema  [] ICD 10: R60.9 Edema unspecified      Charges  Timed Code Treatment Minutes: 45   Total Treatment Minutes: 60     Medicare total (add KX at $2000)  ~150       BWC time in/ time out:    TE time in /out    Manual time in/out    Neuro re ed time in/out    Untimed minutes        [] EVAL (LOW) 71496   [] EVAL (MOD) 36016   [] EVAL (HIGH) 27874   [] RE-EVAL     [x] HP(91966) x 1    [] IONTO  [x] NMR (36616) x 1    [] VASO  [x] Manual (31240) x2 [] Other:CP L shoulder 10'  [] TA x      [] Mech Traction (95880)  [] ES(attended) (95749)      [x] ES (un) (42874):     GOALS:  Patient stated goal: Get left shoulder back  [] Progressing: [] Met: [] Not Met: [] Adjusted    Therapist goals for Patient:   Short Term Goals: To be achieved in: 2 weeks  1. Independent in HEP and progression per patient tolerance, in order to prevent re-injury. [] Progressing: [x] Met: [] Not Met: [] Adjusted  2. Patient will have a decrease in pain to facilitate improvement in movement, function, and ADLs as indicated by Functional Deficits. [x] Progressing: [] Met: [] Not Met: [] Adjusted    Long Term Goals: To be achieved in: 8 weeks  1. Disability index score of 9% or less per Tellez Miser to assist with reaching prior level of function. [] Progressing: [] Met: [] Not Met: [] Adjusted  2. Patient will demonstrate increased AROM to shoulder F/Abd 150 or better, IR T8, ER T4 to allow for proper joint functioning as indicated by patients Functional Deficits. [x] Progressing: [] Met: [] Not Met: [] Adjusted  3. Patient will demonstrate an increase in Strength to 4+/5 to allow for proper functional mobility as indicated by patients Functional Deficits. [x] Progressing: [] Met: [] Not Met: [] Adjusted  4.  Patient will return to reaching above shoulder height to fix hair,

## 2023-09-18 ENCOUNTER — HOSPITAL ENCOUNTER (OUTPATIENT)
Dept: PHYSICAL THERAPY | Age: 68
Setting detail: THERAPIES SERIES
Discharge: HOME | End: 2023-09-18
Payer: MEDICARE

## 2023-09-18 PROCEDURE — 97110 THERAPEUTIC EXERCISES: CPT | Performed by: PHYSICAL THERAPIST

## 2023-09-18 PROCEDURE — 97140 MANUAL THERAPY 1/> REGIONS: CPT | Performed by: PHYSICAL THERAPIST

## 2023-09-18 PROCEDURE — G0283 ELEC STIM OTHER THAN WOUND: HCPCS | Performed by: PHYSICAL THERAPIST

## 2023-09-18 NOTE — FLOWSHEET NOTE
lifting, driving/computer work. Home Exercise Program:    [x] (32782) Reviewed/Progressed HEP activities related to strengthening, flexibility, endurance, ROM of scapular, scapulothoracic and UE control with self care, reaching, carrying, lifting, house/yardwork, driving/computer work  [] (39746) Reviewed/Progressed HEP activities related to improving balance, coordination, kinesthetic sense, posture, motor skill, proprioception of scapular, scapulothoracic and UE control with self care, reaching, carrying, lifting, house/yardwork, driving/computer work      Manual Treatments:  PROM / STM / Oscillations-Mobs:  G-I, II, III, IV (PA's, Inf., Post.)  [x] (84050) Provided manual therapy to mobilize soft tissue/joints of cervical/CT, scapular GHJ and UE for the purpose of modulating pain, promoting relaxation,  increasing ROM, reducing/eliminating soft tissue swelling/inflammation/restriction, improving soft tissue extensibility and allowing for proper ROM for normal function with self care, reaching, carrying, lifting, house/yardwork, driving/computer work    Modalities:  PM/CP x15' Lshoulder, PM/HP x15' neck supine  [] GAME READY (VASO)- for significant edema, swelling, pain control. Vasopneumatic compression applied to  for significant edema, swelling, pain control.       Girth Left Right Post Vaso               [] ICD 10: R60.9 Edema unspecified  [] ICD 10: R22.3 Localized swelling of upper limb  [] ICD 10: R60.1 Generalized edema  [] ICD 10: R60.9 Edema unspecified      Charges  Timed Code Treatment Minutes: 30   Total Treatment Minutes: 45     Medicare total (add KX at $2000)  ~150       BWC time in/ time out:    TE time in /out    Manual time in/out    Neuro re ed time in/out    Untimed minutes        [] EVAL (LOW) 84066   [] EVAL (MOD) 48178   [] EVAL (HIGH) 68531   [] RE-EVAL     [x] RF(48895) x 1    [] IONTO  [] NMR (87057) x 1    [] VASO  [x] Manual (73284) x2 [] Other:CP L shoulder 10'  [] TA x      []

## 2023-09-19 ENCOUNTER — OFFICE VISIT (OUTPATIENT)
Dept: ORTHOPEDIC SURGERY | Age: 68
End: 2023-09-19
Payer: MEDICARE

## 2023-09-19 VITALS — WEIGHT: 188 LBS | HEIGHT: 67 IN | BODY MASS INDEX: 29.51 KG/M2

## 2023-09-19 DIAGNOSIS — M19.012 LOCALIZED OSTEOARTHRITIS OF LEFT SHOULDER: Primary | ICD-10-CM

## 2023-09-19 PROCEDURE — 1036F TOBACCO NON-USER: CPT | Performed by: PHYSICIAN ASSISTANT

## 2023-09-19 PROCEDURE — G8399 PT W/DXA RESULTS DOCUMENT: HCPCS | Performed by: PHYSICIAN ASSISTANT

## 2023-09-19 PROCEDURE — 3017F COLORECTAL CA SCREEN DOC REV: CPT | Performed by: PHYSICIAN ASSISTANT

## 2023-09-19 PROCEDURE — 1090F PRES/ABSN URINE INCON ASSESS: CPT | Performed by: PHYSICIAN ASSISTANT

## 2023-09-19 PROCEDURE — G8417 CALC BMI ABV UP PARAM F/U: HCPCS | Performed by: PHYSICIAN ASSISTANT

## 2023-09-19 PROCEDURE — 99213 OFFICE O/P EST LOW 20 MIN: CPT | Performed by: PHYSICIAN ASSISTANT

## 2023-09-19 PROCEDURE — G8427 DOCREV CUR MEDS BY ELIG CLIN: HCPCS | Performed by: PHYSICIAN ASSISTANT

## 2023-09-19 PROCEDURE — 1123F ACP DISCUSS/DSCN MKR DOCD: CPT | Performed by: PHYSICIAN ASSISTANT

## 2023-09-19 NOTE — PROGRESS NOTES
Dr Ranjan Obrien      Date /Time 9/19/2023             11:05 AM EDT  Name Lio Raya             1955   Location  [facility]  MRN [id number]                Chief Complaint   Patient presents with    Follow-up     Ck Left Shoulder        History of Present Illness    Lio Raya is a 80 y.o. female who presents with  left Shoulder pain. Injury Mechanism:  none. Worker's Comp. & legal issues:   none. Previous Treatments: Ice, Heat, and NSAIDs    Patient presents to the office today for follow-up visit. Patient being treated for left shoulder osteoarthritis. On July 25, 2023 patient did receive an intra-articular cortisone injection. He is here for follow-up visit. The injection did not help very much at all. She does complain of mild cervical pain but has no radicular symptoms. She does have increased pain with active forward flexion and abduction above shoulder level. Previous history: Patient presents the office today for a new problem. Patient here with a chief complaint of left shoulder pain. Patient describes her pain as sharp and stabbing. Things have been gradually increasing over the last 6 months. Pain mostly concentrated over the anterior and lateral aspects. No significant history of trauma but she is aggravated by activities. Patient was seen in the after-hours clinic. She was diagnosed with a rotator cuff strain. She was placed in physical therapy and she is here for follow-up visit. He is doing much better since being placed into physical therapy.     Past Medical History  Past Medical History:   Diagnosis Date    Chronic renal disease, stage III [facility]) [902800] 07/25/2023    DJD (degenerative joint disease) of knee     GERD (gastroesophageal reflux disease)     Hypercholesteremia 04/15/2021    Osteopenia     Postmenopausal HRT (hormone replacement therapy)      Past Surgical History:   Procedure Laterality Date    COLONOSCOPY  01/08/2008    Dr. Terrell De León

## 2023-09-21 ENCOUNTER — APPOINTMENT (OUTPATIENT)
Dept: PHYSICAL THERAPY | Age: 68
End: 2023-09-21
Payer: MEDICARE

## 2023-09-22 ENCOUNTER — APPOINTMENT (OUTPATIENT)
Dept: PHYSICAL THERAPY | Age: 68
End: 2023-09-22
Payer: MEDICARE

## 2023-09-28 ENCOUNTER — OFFICE VISIT (OUTPATIENT)
Dept: ORTHOPEDIC SURGERY | Age: 68
End: 2023-09-28
Payer: MEDICARE

## 2023-09-28 VITALS — WEIGHT: 188 LBS | BODY MASS INDEX: 29.51 KG/M2 | HEIGHT: 67 IN

## 2023-09-28 DIAGNOSIS — M75.22 BICEPS TENDINITIS, LEFT: ICD-10-CM

## 2023-09-28 DIAGNOSIS — M87.022 AVASCULAR NECROSIS OF LEFT HUMERAL HEAD (HCC): ICD-10-CM

## 2023-09-28 DIAGNOSIS — M19.012 LOCALIZED OSTEOARTHRITIS OF LEFT SHOULDER: ICD-10-CM

## 2023-09-28 DIAGNOSIS — Z01.818 PRE-OP TESTING: Primary | ICD-10-CM

## 2023-09-28 DIAGNOSIS — Z01.818 PRE-OP TESTING: ICD-10-CM

## 2023-09-28 LAB
APTT BLD: 28.3 SEC (ref 22.7–35.9)
INR PPP: 0.86 (ref 0.84–1.16)
PROTHROMBIN TIME: 11.8 SEC (ref 11.5–14.8)

## 2023-09-28 PROCEDURE — 1090F PRES/ABSN URINE INCON ASSESS: CPT | Performed by: ORTHOPAEDIC SURGERY

## 2023-09-28 PROCEDURE — 1036F TOBACCO NON-USER: CPT | Performed by: ORTHOPAEDIC SURGERY

## 2023-09-28 PROCEDURE — 99214 OFFICE O/P EST MOD 30 MIN: CPT | Performed by: ORTHOPAEDIC SURGERY

## 2023-09-28 PROCEDURE — G8417 CALC BMI ABV UP PARAM F/U: HCPCS | Performed by: ORTHOPAEDIC SURGERY

## 2023-09-28 PROCEDURE — G8427 DOCREV CUR MEDS BY ELIG CLIN: HCPCS | Performed by: ORTHOPAEDIC SURGERY

## 2023-09-28 PROCEDURE — 3017F COLORECTAL CA SCREEN DOC REV: CPT | Performed by: ORTHOPAEDIC SURGERY

## 2023-09-28 PROCEDURE — 1123F ACP DISCUSS/DSCN MKR DOCD: CPT | Performed by: ORTHOPAEDIC SURGERY

## 2023-09-28 PROCEDURE — G8399 PT W/DXA RESULTS DOCUMENT: HCPCS | Performed by: ORTHOPAEDIC SURGERY

## 2023-09-28 NOTE — PROGRESS NOTES
extensive discussion regarding total shoulder versus reverse shoulder. I believe for AVN, a primary total shoulder replacement is quite reasonable. We will plan for this, but keep reverse on standby. I had an extensive discussion regarding risks and benefits of either surgery. The procedure would be LEFT  50300 Total Shoulder Replacement  32569 Biceps tenodesis    Perioperative considerations include: Pre-operative clearance from medical subspecialty. We reviewed the risks, benefits, alternatives of this approach. We discussed risks including, but not limited to, bleeding, pain, infection, scarring, damage to the neurovascular structures, blood clots, pulmonary embolus, stiffness, implant instability or loosening, implant failure, incomplete relief of pain, and incomplete return of function. We also reviewed the surgical details, expected recovery, and rehabilitation (6-9 months). She expressed understanding and will undergo preoperative medical evaluation and optimization. Electronically signed by Daphney Joseph MD on 9/28/2023 at 2:18 PM  This dictation was generated by voice recognition computer software. Although all attempts are made to edit the dictation for accuracy, there may be errors in the transcription that are not intended.

## 2023-09-29 ENCOUNTER — OFFICE VISIT (OUTPATIENT)
Dept: INTERNAL MEDICINE CLINIC | Age: 68
End: 2023-09-29

## 2023-09-29 ENCOUNTER — TELEPHONE (OUTPATIENT)
Dept: ORTHOPEDIC SURGERY | Age: 68
End: 2023-09-29

## 2023-09-29 VITALS
HEIGHT: 67 IN | SYSTOLIC BLOOD PRESSURE: 140 MMHG | BODY MASS INDEX: 28.88 KG/M2 | DIASTOLIC BLOOD PRESSURE: 70 MMHG | HEART RATE: 64 BPM | RESPIRATION RATE: 14 BRPM | WEIGHT: 184 LBS

## 2023-09-29 DIAGNOSIS — M87.022 AVASCULAR NECROSIS OF LEFT HUMERAL HEAD (HCC): ICD-10-CM

## 2023-09-29 DIAGNOSIS — M75.22 BICEPS TENDINITIS OF LEFT UPPER EXTREMITY: ICD-10-CM

## 2023-09-29 DIAGNOSIS — N18.31 STAGE 3A CHRONIC KIDNEY DISEASE (HCC): ICD-10-CM

## 2023-09-29 DIAGNOSIS — Z01.818 PRE-OP EXAM: Primary | ICD-10-CM

## 2023-09-29 DIAGNOSIS — E78.00 HYPERCHOLESTEREMIA: ICD-10-CM

## 2023-09-29 LAB
ABO + RH BLD: NORMAL
ALBUMIN SERPL-MCNC: 4.8 G/DL (ref 3.4–5)
ANION GAP SERPL CALCULATED.3IONS-SCNC: 13 MMOL/L (ref 3–16)
BASOPHILS # BLD: 0.1 K/UL (ref 0–0.2)
BASOPHILS NFR BLD: 1.5 %
BILIRUB UR QL STRIP.AUTO: NEGATIVE
BLD GP AB SCN SERPL QL: NORMAL
BUN SERPL-MCNC: 12 MG/DL (ref 7–20)
CALCIUM SERPL-MCNC: 9.3 MG/DL (ref 8.3–10.6)
CHLORIDE SERPL-SCNC: 101 MMOL/L (ref 99–110)
CLARITY UR: CLEAR
CO2 SERPL-SCNC: 25 MMOL/L (ref 21–32)
COLOR UR: YELLOW
CREAT SERPL-MCNC: 1 MG/DL (ref 0.6–1.2)
DEPRECATED RDW RBC AUTO: 13.3 % (ref 12.4–15.4)
EOSINOPHIL # BLD: 0.1 K/UL (ref 0–0.6)
EOSINOPHIL NFR BLD: 0.9 %
EST. AVERAGE GLUCOSE BLD GHB EST-MCNC: 122.6 MG/DL
GFR SERPLBLD CREATININE-BSD FMLA CKD-EPI: >60 ML/MIN/{1.73_M2}
GLUCOSE SERPL-MCNC: 103 MG/DL (ref 70–99)
GLUCOSE UR STRIP.AUTO-MCNC: NEGATIVE MG/DL
HBA1C MFR BLD: 5.9 %
HCT VFR BLD AUTO: 41.1 % (ref 36–48)
HGB BLD-MCNC: 13.8 G/DL (ref 12–16)
HGB UR QL STRIP.AUTO: NEGATIVE
KETONES UR STRIP.AUTO-MCNC: 15 MG/DL
LEUKOCYTE ESTERASE UR QL STRIP.AUTO: NEGATIVE
LYMPHOCYTES # BLD: 1.6 K/UL (ref 1–5.1)
LYMPHOCYTES NFR BLD: 27.2 %
MCH RBC QN AUTO: 29.3 PG (ref 26–34)
MCHC RBC AUTO-ENTMCNC: 33.6 G/DL (ref 31–36)
MCV RBC AUTO: 87.2 FL (ref 80–100)
MONOCYTES # BLD: 0.4 K/UL (ref 0–1.3)
MONOCYTES NFR BLD: 6.1 %
NEUTROPHILS # BLD: 3.9 K/UL (ref 1.7–7.7)
NEUTROPHILS NFR BLD: 64.3 %
NITRITE UR QL STRIP.AUTO: NEGATIVE
PH UR STRIP.AUTO: 6 [PH] (ref 5–8)
PLATELET # BLD AUTO: 317 K/UL (ref 135–450)
PMV BLD AUTO: 8 FL (ref 5–10.5)
POTASSIUM SERPL-SCNC: 3.8 MMOL/L (ref 3.5–5.1)
PROT UR STRIP.AUTO-MCNC: NEGATIVE MG/DL
RBC # BLD AUTO: 4.71 M/UL (ref 4–5.2)
SODIUM SERPL-SCNC: 139 MMOL/L (ref 136–145)
SP GR UR STRIP.AUTO: 1.01 (ref 1–1.03)
TRANSFERRIN SERPL-MCNC: 273 MG/DL (ref 200–360)
UA DIPSTICK W REFLEX MICRO PNL UR: ABNORMAL
URN SPEC COLLECT METH UR: ABNORMAL
UROBILINOGEN UR STRIP-ACNC: 0.2 E.U./DL
WBC # BLD AUTO: 6.1 K/UL (ref 4–11)

## 2023-09-29 PROCEDURE — 99214 OFFICE O/P EST MOD 30 MIN: CPT | Performed by: NURSE PRACTITIONER

## 2023-09-29 PROCEDURE — G8417 CALC BMI ABV UP PARAM F/U: HCPCS | Performed by: NURSE PRACTITIONER

## 2023-09-29 PROCEDURE — 3017F COLORECTAL CA SCREEN DOC REV: CPT | Performed by: NURSE PRACTITIONER

## 2023-09-29 PROCEDURE — 1123F ACP DISCUSS/DSCN MKR DOCD: CPT | Performed by: NURSE PRACTITIONER

## 2023-09-29 PROCEDURE — 1036F TOBACCO NON-USER: CPT | Performed by: NURSE PRACTITIONER

## 2023-09-29 PROCEDURE — G8399 PT W/DXA RESULTS DOCUMENT: HCPCS | Performed by: NURSE PRACTITIONER

## 2023-09-29 PROCEDURE — G8427 DOCREV CUR MEDS BY ELIG CLIN: HCPCS | Performed by: NURSE PRACTITIONER

## 2023-09-29 PROCEDURE — 1090F PRES/ABSN URINE INCON ASSESS: CPT | Performed by: NURSE PRACTITIONER

## 2023-09-29 NOTE — PROGRESS NOTES
Pressure Maternal Grandmother     Heart Disease Maternal Grandmother     High Cholesterol Maternal Grandfather     Heart Disease Maternal Grandfather     High Cholesterol Paternal Grandmother     Heart Disease Paternal Grandmother         Carotid Artery Stenosis    COPD Paternal Grandmother     Stroke Paternal Grandmother     High Cholesterol Paternal Grandfather     Heart Disease Paternal Grandfather     Breast Cancer Other     Thyroid Disease Other         Cousin     Social History     Socioeconomic History    Marital status:    Tobacco Use    Smoking status: Never    Smokeless tobacco: Never   Vaping Use    Vaping Use: Never used   Substance and Sexual Activity    Alcohol use: Yes     Alcohol/week: 0.0 standard drinks of alcohol     Comment: rarely    Drug use: No    Sexual activity: Yes     Partners: Male     Social Determinants of Health     Financial Resource Strain: Low Risk  (4/17/2023)    Overall Financial Resource Strain (CARDIA)     Difficulty of Paying Living Expenses: Not hard at all   Food Insecurity: No Food Insecurity (4/17/2023)    Hunger Vital Sign     Worried About Running Out of Food in the Last Year: Never true     Ran Out of Food in the Last Year: Never true   Transportation Needs: Unknown (4/17/2023)    PRAPARE - Transportation     Lack of Transportation (Non-Medical): No   Physical Activity: Insufficiently Active (4/18/2023)    Exercise Vital Sign     Days of Exercise per Week: 3 days     Minutes of Exercise per Session: 30 min   Housing Stability: Unknown (4/17/2023)    Housing Stability Vital Sign     Unstable Housing in the Last Year: No     Current Outpatient Medications   Medication Sig Dispense Refill    mupirocin (BACTROBAN) 2 % ointment Apply twice daily to each nare for 5 days prior to surgical procedure 1 g 0     No current facility-administered medications for this visit.      Allergies   Allergen Reactions    Cephalexin      Edema    Dye [Barium-Containing Compounds] Hives

## 2023-09-29 NOTE — TELEPHONE ENCOUNTER
Auth: NPR  Date: 10/09/23  Type of SX: inpatient  Location: [facility]  CPT: 88936   DX: M87.022  SX area: left shoulder  Insurance: Medicare A&B

## 2023-09-30 LAB — BACTERIA UR CULT: NORMAL

## 2023-10-01 LAB — MRSA SPEC QL CULT: NORMAL

## 2023-10-03 ENCOUNTER — TELEPHONE (OUTPATIENT)
Dept: ORTHOPEDIC SURGERY | Age: 68
End: 2023-10-03

## 2023-10-06 ENCOUNTER — TELEPHONE (OUTPATIENT)
Dept: ORTHOPEDIC SURGERY | Age: 68
End: 2023-10-06

## 2023-10-06 ENCOUNTER — ANESTHESIA EVENT (OUTPATIENT)
Dept: OPERATING ROOM | Age: 68
DRG: 483 | End: 2023-10-06
Payer: MEDICARE

## 2023-10-09 ENCOUNTER — APPOINTMENT (OUTPATIENT)
Dept: GENERAL RADIOLOGY | Age: 68
DRG: 483 | End: 2023-10-09
Attending: ORTHOPAEDIC SURGERY
Payer: MEDICARE

## 2023-10-09 ENCOUNTER — HOSPITAL ENCOUNTER (INPATIENT)
Age: 68
LOS: 1 days | Discharge: HOME | DRG: 483 | End: 2023-10-11
Attending: ORTHOPAEDIC SURGERY | Admitting: ORTHOPAEDIC SURGERY
Payer: MEDICARE

## 2023-10-09 ENCOUNTER — ANESTHESIA (OUTPATIENT)
Dept: OPERATING ROOM | Age: 68
DRG: 483 | End: 2023-10-09
Payer: MEDICARE

## 2023-10-09 DIAGNOSIS — M19.012 LOCALIZED OSTEOARTHRITIS OF LEFT SHOULDER: Primary | ICD-10-CM

## 2023-10-09 LAB
GLUCOSE BLD-MCNC: 122 MG/DL (ref 70–99)
PERFORMED ON: ABNORMAL

## 2023-10-09 PROCEDURE — 6370000000 HC RX 637 (ALT 250 FOR IP): Performed by: ANESTHESIOLOGY

## 2023-10-09 PROCEDURE — C1776 JOINT DEVICE (IMPLANTABLE): HCPCS | Performed by: ORTHOPAEDIC SURGERY

## 2023-10-09 PROCEDURE — 2580000003 HC RX 258: Performed by: ORTHOPAEDIC SURGERY

## 2023-10-09 PROCEDURE — 2580000003 HC RX 258: Performed by: NURSE ANESTHETIST, CERTIFIED REGISTERED

## 2023-10-09 PROCEDURE — 6360000002 HC RX W HCPCS: Performed by: NURSE ANESTHETIST, CERTIFIED REGISTERED

## 2023-10-09 PROCEDURE — 6360000002 HC RX W HCPCS: Performed by: ANESTHESIOLOGY

## 2023-10-09 PROCEDURE — 94761 N-INVAS EAR/PLS OXIMETRY MLT: CPT

## 2023-10-09 PROCEDURE — 64415 NJX AA&/STRD BRCH PLXS IMG: CPT | Performed by: ANESTHESIOLOGY

## 2023-10-09 PROCEDURE — 7100000001 HC PACU RECOVERY - ADDTL 15 MIN: Performed by: ORTHOPAEDIC SURGERY

## 2023-10-09 PROCEDURE — C1713 ANCHOR/SCREW BN/BN,TIS/BN: HCPCS | Performed by: ORTHOPAEDIC SURGERY

## 2023-10-09 PROCEDURE — 73030 X-RAY EXAM OF SHOULDER: CPT

## 2023-10-09 PROCEDURE — 3700000001 HC ADD 15 MINUTES (ANESTHESIA): Performed by: ORTHOPAEDIC SURGERY

## 2023-10-09 PROCEDURE — 3600000015 HC SURGERY LEVEL 5 ADDTL 15MIN: Performed by: ORTHOPAEDIC SURGERY

## 2023-10-09 PROCEDURE — 2709999900 HC NON-CHARGEABLE SUPPLY: Performed by: ORTHOPAEDIC SURGERY

## 2023-10-09 PROCEDURE — 0LS40ZZ REPOSITION LEFT UPPER ARM TENDON, OPEN APPROACH: ICD-10-PCS | Performed by: ORTHOPAEDIC SURGERY

## 2023-10-09 PROCEDURE — L3660 SO 8 AB RSTR CAN/WEB PRE OTS: HCPCS | Performed by: ORTHOPAEDIC SURGERY

## 2023-10-09 PROCEDURE — 2580000003 HC RX 258: Performed by: PHYSICIAN ASSISTANT

## 2023-10-09 PROCEDURE — 2500000003 HC RX 250 WO HCPCS: Performed by: ANESTHESIOLOGY

## 2023-10-09 PROCEDURE — 0RRK0JZ REPLACEMENT OF LEFT SHOULDER JOINT WITH SYNTHETIC SUBSTITUTE, OPEN APPROACH: ICD-10-PCS | Performed by: ORTHOPAEDIC SURGERY

## 2023-10-09 PROCEDURE — 2700000000 HC OXYGEN THERAPY PER DAY

## 2023-10-09 PROCEDURE — 3600000005 HC SURGERY LEVEL 5 BASE: Performed by: ORTHOPAEDIC SURGERY

## 2023-10-09 PROCEDURE — A4217 STERILE WATER/SALINE, 500 ML: HCPCS | Performed by: ORTHOPAEDIC SURGERY

## 2023-10-09 PROCEDURE — 2500000003 HC RX 250 WO HCPCS: Performed by: NURSE ANESTHETIST, CERTIFIED REGISTERED

## 2023-10-09 PROCEDURE — 7100000000 HC PACU RECOVERY - FIRST 15 MIN: Performed by: ORTHOPAEDIC SURGERY

## 2023-10-09 PROCEDURE — 6360000002 HC RX W HCPCS: Performed by: ORTHOPAEDIC SURGERY

## 2023-10-09 PROCEDURE — 3700000000 HC ANESTHESIA ATTENDED CARE: Performed by: ORTHOPAEDIC SURGERY

## 2023-10-09 PROCEDURE — 2500000003 HC RX 250 WO HCPCS

## 2023-10-09 DEVICE — EASYTECH ANCHOR BASE TA6V Ø30 MM CEMENTLESS TI/HA
Type: IMPLANTABLE DEVICE | Site: SHOULDER | Status: FUNCTIONAL
Brand: EASYTECH ANATOMICAL SHOULDER SYSTEM

## 2023-10-09 DEVICE — CEMENT BNE 20ML 40GM FULL DOSE PMMA W/O ANTIBIO M VISC: Type: IMPLANTABLE DEVICE | Site: SHOULDER | Status: FUNCTIONAL

## 2023-10-09 DEVICE — MULTIFIX S-ULTRA 5.5MM KNOTLESS ANCHOR
Type: IMPLANTABLE DEVICE | Site: SHOULDER | Status: FUNCTIONAL
Brand: MULTIFIX

## 2023-10-09 DEVICE — DOUBLE TAPER TA6V +0 MM 0°
Type: IMPLANTABLE DEVICE | Site: SHOULDER | Status: FUNCTIONAL
Brand: HUMELOCK II CEMENTED SHOULDER SYSTEM

## 2023-10-09 DEVICE — 3-4 PEGS GLENOID PE CEMENTED SIZE XS
Type: IMPLANTABLE DEVICE | Site: SHOULDER | Status: FUNCTIONAL
Brand: HUMERIS SHOULDER

## 2023-10-09 RX ORDER — SODIUM CHLORIDE 9 MG/ML
INJECTION, SOLUTION INTRAVENOUS PRN
Status: DISCONTINUED | OUTPATIENT
Start: 2023-10-09 | End: 2023-10-09 | Stop reason: HOSPADM

## 2023-10-09 RX ORDER — ONDANSETRON 4 MG/1
4 TABLET, FILM COATED ORAL 3 TIMES DAILY PRN
Qty: 15 TABLET | Refills: 0 | Status: SHIPPED | OUTPATIENT
Start: 2023-10-09

## 2023-10-09 RX ORDER — OXYCODONE HYDROCHLORIDE 5 MG/1
10 TABLET ORAL EVERY 4 HOURS PRN
Status: DISCONTINUED | OUTPATIENT
Start: 2023-10-09 | End: 2023-10-11 | Stop reason: HOSPADM

## 2023-10-09 RX ORDER — ROCURONIUM BROMIDE 10 MG/ML
INJECTION, SOLUTION INTRAVENOUS PRN
Status: DISCONTINUED | OUTPATIENT
Start: 2023-10-09 | End: 2023-10-09 | Stop reason: SDUPTHER

## 2023-10-09 RX ORDER — TRANEXAMIC ACID 100 MG/ML
INJECTION, SOLUTION INTRAVENOUS PRN
Status: DISCONTINUED | OUTPATIENT
Start: 2023-10-09 | End: 2023-10-09 | Stop reason: SDUPTHER

## 2023-10-09 RX ORDER — APREPITANT 40 MG/1
40 CAPSULE ORAL ONCE
Status: COMPLETED | OUTPATIENT
Start: 2023-10-09 | End: 2023-10-09

## 2023-10-09 RX ORDER — INSULIN LISPRO 100 [IU]/ML
0-4 INJECTION, SOLUTION INTRAVENOUS; SUBCUTANEOUS NIGHTLY
Status: DISCONTINUED | OUTPATIENT
Start: 2023-10-09 | End: 2023-10-11 | Stop reason: HOSPADM

## 2023-10-09 RX ORDER — ACETAMINOPHEN 500 MG
1000 TABLET ORAL ONCE
Status: COMPLETED | OUTPATIENT
Start: 2023-10-09 | End: 2023-10-09

## 2023-10-09 RX ORDER — PROPOFOL 10 MG/ML
INJECTION, EMULSION INTRAVENOUS PRN
Status: DISCONTINUED | OUTPATIENT
Start: 2023-10-09 | End: 2023-10-09 | Stop reason: SDUPTHER

## 2023-10-09 RX ORDER — FENTANYL CITRATE 50 UG/ML
INJECTION, SOLUTION INTRAMUSCULAR; INTRAVENOUS PRN
Status: DISCONTINUED | OUTPATIENT
Start: 2023-10-09 | End: 2023-10-09 | Stop reason: SDUPTHER

## 2023-10-09 RX ORDER — SULFAMETHOXAZOLE AND TRIMETHOPRIM 800; 160 MG/1; MG/1
1 TABLET ORAL 2 TIMES DAILY
Qty: 6 TABLET | Refills: 0 | Status: SHIPPED | OUTPATIENT
Start: 2023-10-09 | End: 2023-10-10 | Stop reason: HOSPADM

## 2023-10-09 RX ORDER — ONDANSETRON 2 MG/ML
4 INJECTION INTRAMUSCULAR; INTRAVENOUS EVERY 30 MIN PRN
Status: DISCONTINUED | OUTPATIENT
Start: 2023-10-09 | End: 2023-10-09 | Stop reason: HOSPADM

## 2023-10-09 RX ORDER — SODIUM CHLORIDE 0.9 % (FLUSH) 0.9 %
5-40 SYRINGE (ML) INJECTION EVERY 12 HOURS SCHEDULED
Status: DISCONTINUED | OUTPATIENT
Start: 2023-10-09 | End: 2023-10-11 | Stop reason: HOSPADM

## 2023-10-09 RX ORDER — SODIUM CHLORIDE 0.9 % (FLUSH) 0.9 %
5-40 SYRINGE (ML) INJECTION PRN
Status: DISCONTINUED | OUTPATIENT
Start: 2023-10-09 | End: 2023-10-09 | Stop reason: HOSPADM

## 2023-10-09 RX ORDER — MAGNESIUM HYDROXIDE 1200 MG/15ML
LIQUID ORAL CONTINUOUS PRN
Status: COMPLETED | OUTPATIENT
Start: 2023-10-09 | End: 2023-10-09

## 2023-10-09 RX ORDER — DEXAMETHASONE SODIUM PHOSPHATE 4 MG/ML
INJECTION, SOLUTION INTRA-ARTICULAR; INTRALESIONAL; INTRAMUSCULAR; INTRAVENOUS; SOFT TISSUE PRN
Status: DISCONTINUED | OUTPATIENT
Start: 2023-10-09 | End: 2023-10-09 | Stop reason: SDUPTHER

## 2023-10-09 RX ORDER — EPHEDRINE SULFATE 50 MG/ML
INJECTION INTRAVENOUS PRN
Status: DISCONTINUED | OUTPATIENT
Start: 2023-10-09 | End: 2023-10-09 | Stop reason: SDUPTHER

## 2023-10-09 RX ORDER — LIDOCAINE HYDROCHLORIDE 10 MG/ML
1 INJECTION, SOLUTION EPIDURAL; INFILTRATION; INTRACAUDAL; PERINEURAL
Status: DISCONTINUED | OUTPATIENT
Start: 2023-10-09 | End: 2023-10-09 | Stop reason: HOSPADM

## 2023-10-09 RX ORDER — INSULIN LISPRO 100 [IU]/ML
0.08 INJECTION, SOLUTION INTRAVENOUS; SUBCUTANEOUS
Status: DISCONTINUED | OUTPATIENT
Start: 2023-10-09 | End: 2023-10-11 | Stop reason: HOSPADM

## 2023-10-09 RX ORDER — OXYCODONE HYDROCHLORIDE 5 MG/1
10 TABLET ORAL PRN
Status: DISCONTINUED | OUTPATIENT
Start: 2023-10-09 | End: 2023-10-09 | Stop reason: HOSPADM

## 2023-10-09 RX ORDER — CYCLOBENZAPRINE HCL 10 MG
10 TABLET ORAL 3 TIMES DAILY PRN
Qty: 30 TABLET | Refills: 0 | Status: SHIPPED | OUTPATIENT
Start: 2023-10-09 | End: 2023-10-19

## 2023-10-09 RX ORDER — ASPIRIN 81 MG/1
81 TABLET, CHEWABLE ORAL 2 TIMES DAILY
Qty: 60 TABLET | Refills: 0 | Status: SHIPPED | OUTPATIENT
Start: 2023-10-09

## 2023-10-09 RX ORDER — LIDOCAINE HYDROCHLORIDE 20 MG/ML
INJECTION, SOLUTION INFILTRATION; PERINEURAL PRN
Status: DISCONTINUED | OUTPATIENT
Start: 2023-10-09 | End: 2023-10-09 | Stop reason: SDUPTHER

## 2023-10-09 RX ORDER — SODIUM CHLORIDE, SODIUM LACTATE, POTASSIUM CHLORIDE, CALCIUM CHLORIDE 600; 310; 30; 20 MG/100ML; MG/100ML; MG/100ML; MG/100ML
INJECTION, SOLUTION INTRAVENOUS CONTINUOUS
Status: DISCONTINUED | OUTPATIENT
Start: 2023-10-09 | End: 2023-10-09 | Stop reason: HOSPADM

## 2023-10-09 RX ORDER — INSULIN LISPRO 100 [IU]/ML
0-8 INJECTION, SOLUTION INTRAVENOUS; SUBCUTANEOUS
Status: DISCONTINUED | OUTPATIENT
Start: 2023-10-09 | End: 2023-10-11 | Stop reason: HOSPADM

## 2023-10-09 RX ORDER — VANCOMYCIN HYDROCHLORIDE 1 G/20ML
INJECTION, POWDER, LYOPHILIZED, FOR SOLUTION INTRAVENOUS PRN
Status: DISCONTINUED | OUTPATIENT
Start: 2023-10-09 | End: 2023-10-09 | Stop reason: ALTCHOICE

## 2023-10-09 RX ORDER — SODIUM CHLORIDE 0.9 % (FLUSH) 0.9 %
5-40 SYRINGE (ML) INJECTION PRN
Status: DISCONTINUED | OUTPATIENT
Start: 2023-10-09 | End: 2023-10-11 | Stop reason: HOSPADM

## 2023-10-09 RX ORDER — APREPITANT 40 MG/1
40 CAPSULE ORAL ONCE
Status: DISCONTINUED | OUTPATIENT
Start: 2023-10-09 | End: 2023-10-09

## 2023-10-09 RX ORDER — POLYETHYLENE GLYCOL 3350 17 G/17G
17 POWDER, FOR SOLUTION ORAL DAILY
Status: DISCONTINUED | OUTPATIENT
Start: 2023-10-10 | End: 2023-10-11 | Stop reason: HOSPADM

## 2023-10-09 RX ORDER — BUPIVACAINE HYDROCHLORIDE AND EPINEPHRINE 2.5; 5 MG/ML; UG/ML
INJECTION, SOLUTION EPIDURAL; INFILTRATION; INTRACAUDAL; PERINEURAL
Status: COMPLETED | OUTPATIENT
Start: 2023-10-09 | End: 2023-10-09

## 2023-10-09 RX ORDER — SODIUM CHLORIDE 0.9 % (FLUSH) 0.9 %
5-40 SYRINGE (ML) INJECTION EVERY 12 HOURS SCHEDULED
Status: DISCONTINUED | OUTPATIENT
Start: 2023-10-09 | End: 2023-10-09 | Stop reason: HOSPADM

## 2023-10-09 RX ORDER — SCOLOPAMINE TRANSDERMAL SYSTEM 1 MG/1
1 PATCH, EXTENDED RELEASE TRANSDERMAL ONCE
Status: DISCONTINUED | OUTPATIENT
Start: 2023-10-09 | End: 2023-10-11 | Stop reason: HOSPADM

## 2023-10-09 RX ORDER — ACETAMINOPHEN 325 MG/1
650 TABLET ORAL EVERY 6 HOURS
Status: DISCONTINUED | OUTPATIENT
Start: 2023-10-09 | End: 2023-10-11 | Stop reason: HOSPADM

## 2023-10-09 RX ORDER — DEXTROSE MONOHYDRATE 100 MG/ML
INJECTION, SOLUTION INTRAVENOUS CONTINUOUS PRN
Status: DISCONTINUED | OUTPATIENT
Start: 2023-10-09 | End: 2023-10-11 | Stop reason: HOSPADM

## 2023-10-09 RX ORDER — CELECOXIB 100 MG/1
200 CAPSULE ORAL ONCE
Status: COMPLETED | OUTPATIENT
Start: 2023-10-09 | End: 2023-10-09

## 2023-10-09 RX ORDER — SODIUM CHLORIDE, SODIUM LACTATE, POTASSIUM CHLORIDE, CALCIUM CHLORIDE 600; 310; 30; 20 MG/100ML; MG/100ML; MG/100ML; MG/100ML
INJECTION, SOLUTION INTRAVENOUS CONTINUOUS PRN
Status: DISCONTINUED | OUTPATIENT
Start: 2023-10-09 | End: 2023-10-09 | Stop reason: SDUPTHER

## 2023-10-09 RX ORDER — PHENYLEPHRINE HCL IN 0.9% NACL 1 MG/10 ML
SYRINGE (ML) INTRAVENOUS PRN
Status: DISCONTINUED | OUTPATIENT
Start: 2023-10-09 | End: 2023-10-09 | Stop reason: SDUPTHER

## 2023-10-09 RX ORDER — SCOLOPAMINE TRANSDERMAL SYSTEM 1 MG/1
1 PATCH, EXTENDED RELEASE TRANSDERMAL ONCE
Status: DISCONTINUED | OUTPATIENT
Start: 2023-10-09 | End: 2023-10-09

## 2023-10-09 RX ORDER — MORPHINE SULFATE 4 MG/ML
4 INJECTION, SOLUTION INTRAMUSCULAR; INTRAVENOUS
Status: DISCONTINUED | OUTPATIENT
Start: 2023-10-09 | End: 2023-10-10

## 2023-10-09 RX ORDER — MORPHINE SULFATE 2 MG/ML
2 INJECTION, SOLUTION INTRAMUSCULAR; INTRAVENOUS
Status: DISCONTINUED | OUTPATIENT
Start: 2023-10-09 | End: 2023-10-10

## 2023-10-09 RX ORDER — SODIUM CHLORIDE, SODIUM LACTATE, POTASSIUM CHLORIDE, CALCIUM CHLORIDE 600; 310; 30; 20 MG/100ML; MG/100ML; MG/100ML; MG/100ML
INJECTION, SOLUTION INTRAVENOUS CONTINUOUS
Status: DISCONTINUED | OUTPATIENT
Start: 2023-10-09 | End: 2023-10-11 | Stop reason: HOSPADM

## 2023-10-09 RX ORDER — OXYCODONE HYDROCHLORIDE 5 MG/1
5 TABLET ORAL EVERY 4 HOURS PRN
Status: DISCONTINUED | OUTPATIENT
Start: 2023-10-09 | End: 2023-10-11 | Stop reason: HOSPADM

## 2023-10-09 RX ORDER — SODIUM CHLORIDE 9 MG/ML
INJECTION, SOLUTION INTRAVENOUS PRN
Status: DISCONTINUED | OUTPATIENT
Start: 2023-10-09 | End: 2023-10-11 | Stop reason: HOSPADM

## 2023-10-09 RX ORDER — OXYCODONE HYDROCHLORIDE 5 MG/1
5 TABLET ORAL EVERY 6 HOURS PRN
Qty: 28 TABLET | Refills: 0 | Status: SHIPPED | OUTPATIENT
Start: 2023-10-09 | End: 2023-10-16

## 2023-10-09 RX ORDER — ONDANSETRON 2 MG/ML
INJECTION INTRAMUSCULAR; INTRAVENOUS PRN
Status: DISCONTINUED | OUTPATIENT
Start: 2023-10-09 | End: 2023-10-09 | Stop reason: SDUPTHER

## 2023-10-09 RX ORDER — GLYCOPYRROLATE 0.2 MG/ML
INJECTION INTRAMUSCULAR; INTRAVENOUS PRN
Status: DISCONTINUED | OUTPATIENT
Start: 2023-10-09 | End: 2023-10-09 | Stop reason: SDUPTHER

## 2023-10-09 RX ORDER — OXYCODONE HYDROCHLORIDE 5 MG/1
5 TABLET ORAL PRN
Status: DISCONTINUED | OUTPATIENT
Start: 2023-10-09 | End: 2023-10-09 | Stop reason: HOSPADM

## 2023-10-09 RX ADMIN — SODIUM CHLORIDE, SODIUM LACTATE, POTASSIUM CHLORIDE, AND CALCIUM CHLORIDE: .6; .31; .03; .02 INJECTION, SOLUTION INTRAVENOUS at 14:41

## 2023-10-09 RX ADMIN — DEXMEDETOMIDINE HYDROCHLORIDE 5 MCG: 100 INJECTION, SOLUTION INTRAVENOUS at 15:20

## 2023-10-09 RX ADMIN — FENTANYL CITRATE 25 MCG: 50 INJECTION, SOLUTION INTRAMUSCULAR; INTRAVENOUS at 14:28

## 2023-10-09 RX ADMIN — CELECOXIB 200 MG: 100 CAPSULE ORAL at 14:34

## 2023-10-09 RX ADMIN — LIDOCAINE HYDROCHLORIDE 60 MG: 20 INJECTION, SOLUTION INFILTRATION; PERINEURAL at 14:47

## 2023-10-09 RX ADMIN — Medication 200 MCG: at 16:16

## 2023-10-09 RX ADMIN — FENTANYL CITRATE 25 MCG: 50 INJECTION, SOLUTION INTRAMUSCULAR; INTRAVENOUS at 14:22

## 2023-10-09 RX ADMIN — Medication 10 ML: at 21:51

## 2023-10-09 RX ADMIN — FENTANYL CITRATE 25 MCG: 50 INJECTION, SOLUTION INTRAMUSCULAR; INTRAVENOUS at 14:25

## 2023-10-09 RX ADMIN — HYDROMORPHONE HYDROCHLORIDE 0.5 MG: 1 INJECTION, SOLUTION INTRAMUSCULAR; INTRAVENOUS; SUBCUTANEOUS at 18:31

## 2023-10-09 RX ADMIN — Medication 100 MCG: at 15:41

## 2023-10-09 RX ADMIN — Medication 200 MCG: at 16:18

## 2023-10-09 RX ADMIN — FENTANYL CITRATE 50 MCG: 50 INJECTION, SOLUTION INTRAMUSCULAR; INTRAVENOUS at 14:57

## 2023-10-09 RX ADMIN — Medication 100 MCG: at 15:38

## 2023-10-09 RX ADMIN — Medication 200 MCG: at 16:22

## 2023-10-09 RX ADMIN — GLYCOPYRROLATE 0.2 MG: 0.2 INJECTION, SOLUTION INTRAMUSCULAR; INTRAVENOUS at 14:56

## 2023-10-09 RX ADMIN — ONDANSETRON 4 MG: 2 INJECTION INTRAMUSCULAR; INTRAVENOUS at 18:20

## 2023-10-09 RX ADMIN — Medication 100 MCG: at 15:21

## 2023-10-09 RX ADMIN — PROPOFOL 30 MG: 10 INJECTION, EMULSION INTRAVENOUS at 14:48

## 2023-10-09 RX ADMIN — HYDROMORPHONE HYDROCHLORIDE 0.5 MG: 1 INJECTION, SOLUTION INTRAMUSCULAR; INTRAVENOUS; SUBCUTANEOUS at 17:48

## 2023-10-09 RX ADMIN — ACETAMINOPHEN 1000 MG: 500 TABLET ORAL at 14:34

## 2023-10-09 RX ADMIN — APREPITANT 40 MG: 40 CAPSULE ORAL at 14:35

## 2023-10-09 RX ADMIN — BUPIVACAINE HYDROCHLORIDE AND EPINEPHRINE BITARTRATE 20 ML: 2.5; .005 INJECTION, SOLUTION EPIDURAL; INFILTRATION; INTRACAUDAL; PERINEURAL at 14:10

## 2023-10-09 RX ADMIN — ROCURONIUM BROMIDE 50 MG: 50 INJECTION, SOLUTION INTRAVENOUS at 14:47

## 2023-10-09 RX ADMIN — PROPOFOL 140 MG: 10 INJECTION, EMULSION INTRAVENOUS at 14:47

## 2023-10-09 RX ADMIN — Medication 100 MCG: at 15:32

## 2023-10-09 RX ADMIN — ONDANSETRON 4 MG: 2 INJECTION INTRAMUSCULAR; INTRAVENOUS at 17:13

## 2023-10-09 RX ADMIN — ROCURONIUM BROMIDE 20 MG: 50 INJECTION, SOLUTION INTRAVENOUS at 15:44

## 2023-10-09 RX ADMIN — SODIUM CHLORIDE, SODIUM LACTATE, POTASSIUM CHLORIDE, AND CALCIUM CHLORIDE: .6; .31; .03; .02 INJECTION, SOLUTION INTRAVENOUS at 15:58

## 2023-10-09 RX ADMIN — EPHEDRINE SULFATE 5 MG: 50 INJECTION INTRAVENOUS at 16:27

## 2023-10-09 RX ADMIN — HYDROMORPHONE HYDROCHLORIDE 0.5 MG: 1 INJECTION, SOLUTION INTRAMUSCULAR; INTRAVENOUS; SUBCUTANEOUS at 19:16

## 2023-10-09 RX ADMIN — Medication 100 MCG: at 15:14

## 2023-10-09 RX ADMIN — DEXAMETHASONE SODIUM PHOSPHATE 8 MG: 4 INJECTION, SOLUTION INTRAMUSCULAR; INTRAVENOUS at 15:09

## 2023-10-09 RX ADMIN — Medication 100 MCG: at 15:24

## 2023-10-09 RX ADMIN — FENTANYL CITRATE 25 MCG: 50 INJECTION, SOLUTION INTRAMUSCULAR; INTRAVENOUS at 14:19

## 2023-10-09 RX ADMIN — SODIUM CHLORIDE, SODIUM LACTATE, POTASSIUM CHLORIDE, AND CALCIUM CHLORIDE: .6; .31; .03; .02 INJECTION, SOLUTION INTRAVENOUS at 17:17

## 2023-10-09 RX ADMIN — Medication 200 MCG: at 16:13

## 2023-10-09 RX ADMIN — FENTANYL CITRATE 50 MCG: 50 INJECTION, SOLUTION INTRAMUSCULAR; INTRAVENOUS at 14:50

## 2023-10-09 RX ADMIN — TRANEXAMIC ACID 1000 MG: 100 INJECTION, SOLUTION INTRAVENOUS at 15:07

## 2023-10-09 RX ADMIN — Medication 100 MCG: at 15:36

## 2023-10-09 RX ADMIN — FENTANYL CITRATE 50 MCG: 50 INJECTION, SOLUTION INTRAMUSCULAR; INTRAVENOUS at 17:28

## 2023-10-09 RX ADMIN — SUGAMMADEX 200 MG: 100 INJECTION, SOLUTION INTRAVENOUS at 17:15

## 2023-10-09 RX ADMIN — SODIUM CHLORIDE, POTASSIUM CHLORIDE, SODIUM LACTATE AND CALCIUM CHLORIDE: 600; 310; 30; 20 INJECTION, SOLUTION INTRAVENOUS at 21:56

## 2023-10-09 ASSESSMENT — PAIN - FUNCTIONAL ASSESSMENT: PAIN_FUNCTIONAL_ASSESSMENT: 0-10

## 2023-10-09 ASSESSMENT — PAIN DESCRIPTION - ORIENTATION: ORIENTATION: LEFT

## 2023-10-09 ASSESSMENT — PAIN DESCRIPTION - LOCATION: LOCATION: SHOULDER

## 2023-10-09 NOTE — H&P
Update History & Physical     The patient's History and Physical of 9/29/2023 was reviewed with the patient and I examined the patient. There was no change. The surgical site was confirmed by the patient and me. Plan: The risks, benefits, expected outcome, and alternative to the recommended procedure have been discussed with the patient / family. Patient understands and wants to proceed with the procedure.       Electronically signed by Adelaide Guerra MD on 10/9/2023 at 1:28 PM

## 2023-10-09 NOTE — ANESTHESIA PROCEDURE NOTES
Peripheral Block    Patient location during procedure: pre-op  Reason for block: procedure for pain, post-op pain management and at surgeon's request  Start time: 10/9/2023 2:10 PM  End time: 10/9/2023 2:22 PM  Staffing  Performed: anesthesiologist   Anesthesiologist: Ifeoma Ying MD  Performed by: Ifeoma Ying MD  Authorized by: Ifeoma Ying MD    Preanesthetic Checklist  Completed: patient identified, IV checked and anesthesia consent given  Peripheral Block   Patient position: sitting  Prep: ChloraPrep  Provider prep: sterile gloves  Patient monitoring: responsive to questions  Block type: Brachial plexus  Interscalene  Laterality: left  Injection technique: single-shot  Guidance: ultrasound guided    Needle   Needle type: short-bevel   Needle gauge: 21 G  Needle localization: ultrasound guidance  Needle length: 8 cm  Assessment   Injection assessment: negative aspiration for heme, no paresthesia on injection, local visualized surrounding nerve on ultrasound and no intravascular symptoms  Paresthesia pain: none  Slow fractionated injection: yes  Hemodynamics: stable  Real-time US image taken/store: yes  Outcomes: patient tolerated procedure well    Medications Administered  bupivacaine 0.25%-EPINEPHrine injection 1:[id number] - Perineural   20 mL - 10/9/2023 2:10:00 PM

## 2023-10-09 NOTE — ANESTHESIA POSTPROCEDURE EVALUATION
Department of Anesthesiology  Postprocedure Note    Patient: Leandro Mo  MRN: [id number]  YOB: 1955  Date of evaluation: 10/9/2023      Procedure Summary     Date: 10/09/23 Room / Location: [address] [place] [address] / [address] [place] [address]    Anesthesia Start: 6945 Anesthesia Stop: 4767    Procedure: LEFT TOTAL SHOULDER REPLACEMENT, BICEPS TENODESIS FX SHOULDER (Left: Shoulder) Diagnosis:       Bicipital tendinitis of left shoulder      Avascular necrosis of humeral head, left (HCC)      (Bicipital tendinitis of left shoulder [M75.22])      (Avascular necrosis of humeral head, left ([address]) [L59.102])    Surgeons: Elizabeth Brooks MD Responsible Provider: Jacky Cervantes MD    Anesthesia Type: general ASA Status: 2          Anesthesia Type: No value filed.     Mario Phase I: Mario Score: 6    Mario Phase II:        Anesthesia Post Evaluation    Patient location during evaluation: PACU  Patient participation: complete - patient participated  Level of consciousness: awake and alert  Airway patency: patent  Nausea & Vomiting: no nausea and no vomiting  Complications: no  Cardiovascular status: hemodynamically stable  Respiratory status: acceptable  Hydration status: euvolemic  Pain management: adequate

## 2023-10-09 NOTE — DISCHARGE INSTRUCTIONS
Total Shoulder Replacement  Discharge Instructions    Surgical Site Care:  Keep incision clean and dry. May shower on post-op day #3 with waterproof dressing on. Change to new waterproof dressing between 5-7 days. Physical Therapy:  No weight bearing on surgical arm  Precautions  Per Physical Therapy handout  Patient should remove sling several times a day and work on hand, wrist, and elbow range of motion exercises. No range of motion of the shoulder  Sling to be applied at all times except for hygiene  Pain Medications  You were given oxycodone (Oxycontin, Oxyir)  Wean off pain medications as you deem appropriate as long as pain is under control  Contact the Office if you notice any reactions to the medication or need a refill  Be sure to drink plenty of fluids (recommend water) while taking narcotic pain medications to prevent constipation  You may take an over the counter laxative or stool softener as needed to prevent/treat constipation as well, we recommend Senokot S OTC. Cold packs/Ice packs/Machine  May be used as much as necessary to control swelling/inflammation/soreness  Be sure to have a barrier (cloth, clothing, towel) between the site and the ice pack to prevent frostbite  Contact the office if  Increased redness, swelling, drainage of any kind, and/or pain to surgery site. As well as new onset fevers and or chills. These could signify an infection. Arm tenderness to touch as well as increased swelling or redness. This could signify a clot formation. Numbness or tingling to an area around the incision site or below the incision site (fingers). Any rash appears, increased  or new onset nausea/vomiting occur. This may indicate a reaction to a medication. Phone # [phone]  Follow up with Dr. Eliazar Cristina or Pineda Priest PA-C at scheduled appointment time.    Please continue to use your Incentive Spirometer at home every hour while awake   *** Please contact [facility]

## 2023-10-09 NOTE — OP NOTE
conservative treatment options. The activities of daily living have been affected quite a bit. Patient wanted to regain mobility and be active as possible, mainly work with overhead activity and avoid nocturnal symptoms. Patient understood the risk benefits and alternatives in detail and wanted to proceed with the above operation. Procedure Details:   I marked the surgical site of the left shoulder for surgery. He was taken back to the operating theater laid supine the table the bony prominences well-padded. General anesthesia was induced. We transferred the patient to the operating table, beachchair. We secured the head and cervical spine within the head godoy, properly padded the bony prominences here. We secured this in a neutral position. The left upper extremity was prepped and draped in standard sterile fashion, with arm godoy. Timeout was performed. Antibiotics 2 g of Ancef were given. Tranexamic acid 1 g was given at start. Primary total shoulder arthroplasty with biceps tenodesis:  We began the procedure with a deltopectoral incision. Sharp dissection was carried down through the subcutaneous tissue down to the level of the fascia overlying the deltoid. Subcutaneous flaps were mobilized and developed and the deltopectoral interval was identified with cephalic vein, dissected and medially mobilized. Cephalic vein was protected throughout the case and was intact after the completion of the case. The biceps tendon was identified in the bicipital groove, it was clearly degenerated and synovitic. We transected the biceps tendon distally and tenodesis into the upper border of the pectorals major using a number 2 Ethibond suture in a figure of eight stitch fashion. Extra length of the tendon was then resected and it is entered into the rotator cuff interval.  The rotator cuff including the subscapularis was fully intact. I placed #1 Vicryl suture medially, 3 sister vessels.     The

## 2023-10-10 PROBLEM — M87.112 STEROID-INDUCED AVASCULAR NECROSIS OF LEFT SHOULDER (HCC): Status: ACTIVE | Noted: 2023-10-10

## 2023-10-10 PROBLEM — T38.0X5A STEROID-INDUCED AVASCULAR NECROSIS OF LEFT SHOULDER (HCC): Status: ACTIVE | Noted: 2023-10-10

## 2023-10-10 LAB
ALBUMIN SERPL-MCNC: 3.1 G/DL (ref 3.4–5)
ALBUMIN/GLOB SERPL: 1.2 {RATIO} (ref 1.1–2.2)
ALP SERPL-CCNC: 64 U/L (ref 40–129)
ALT SERPL-CCNC: 10 U/L (ref 10–40)
ANION GAP SERPL CALCULATED.3IONS-SCNC: 10 MMOL/L (ref 3–16)
AST SERPL-CCNC: 14 U/L (ref 15–37)
BASOPHILS # BLD: 0 K/UL (ref 0–0.2)
BASOPHILS NFR BLD: 0.2 %
BILIRUB SERPL-MCNC: 0.3 MG/DL (ref 0–1)
BUN SERPL-MCNC: 9 MG/DL (ref 7–20)
CALCIUM SERPL-MCNC: 8 MG/DL (ref 8.3–10.6)
CHLORIDE SERPL-SCNC: 103 MMOL/L (ref 99–110)
CO2 SERPL-SCNC: 18 MMOL/L (ref 21–32)
CREAT SERPL-MCNC: 0.7 MG/DL (ref 0.6–1.2)
DEPRECATED RDW RBC AUTO: 13.2 % (ref 12.4–15.4)
EOSINOPHIL # BLD: 0 K/UL (ref 0–0.6)
EOSINOPHIL NFR BLD: 0.1 %
GFR SERPLBLD CREATININE-BSD FMLA CKD-EPI: >60 ML/MIN/{1.73_M2}
GLUCOSE BLD-MCNC: 102 MG/DL (ref 70–99)
GLUCOSE BLD-MCNC: 110 MG/DL (ref 70–99)
GLUCOSE BLD-MCNC: 113 MG/DL (ref 70–99)
GLUCOSE BLD-MCNC: 94 MG/DL (ref 70–99)
GLUCOSE SERPL-MCNC: 141 MG/DL (ref 70–99)
HCT VFR BLD AUTO: 33.5 % (ref 36–48)
HGB BLD-MCNC: 11 G/DL (ref 12–16)
LYMPHOCYTES # BLD: 0.9 K/UL (ref 1–5.1)
LYMPHOCYTES NFR BLD: 8.9 %
MCH RBC QN AUTO: 29 PG (ref 26–34)
MCHC RBC AUTO-ENTMCNC: 32.9 G/DL (ref 31–36)
MCV RBC AUTO: 88 FL (ref 80–100)
MONOCYTES # BLD: 0.6 K/UL (ref 0–1.3)
MONOCYTES NFR BLD: 6.6 %
NEUTROPHILS # BLD: 8.1 K/UL (ref 1.7–7.7)
NEUTROPHILS NFR BLD: 84.2 %
PERFORMED ON: ABNORMAL
PERFORMED ON: NORMAL
PLATELET # BLD AUTO: 94 K/UL (ref 135–450)
PLATELET BLD QL SMEAR: ABNORMAL
PMV BLD AUTO: 7.6 FL (ref 5–10.5)
POTASSIUM SERPL-SCNC: 4.1 MMOL/L (ref 3.5–5.1)
PROT SERPL-MCNC: 5.7 G/DL (ref 6.4–8.2)
RBC # BLD AUTO: 3.81 M/UL (ref 4–5.2)
SLIDE REVIEW: ABNORMAL
SODIUM SERPL-SCNC: 131 MMOL/L (ref 136–145)
WBC # BLD AUTO: 9.7 K/UL (ref 4–11)

## 2023-10-10 PROCEDURE — 6360000002 HC RX W HCPCS: Performed by: SPECIALIST/TECHNOLOGIST

## 2023-10-10 PROCEDURE — 36415 COLL VENOUS BLD VENIPUNCTURE: CPT

## 2023-10-10 PROCEDURE — 80053 COMPREHEN METABOLIC PANEL: CPT

## 2023-10-10 PROCEDURE — 97535 SELF CARE MNGMENT TRAINING: CPT

## 2023-10-10 PROCEDURE — 2580000003 HC RX 258: Performed by: PHYSICIAN ASSISTANT

## 2023-10-10 PROCEDURE — 6360000002 HC RX W HCPCS: Performed by: PHYSICIAN ASSISTANT

## 2023-10-10 PROCEDURE — 97530 THERAPEUTIC ACTIVITIES: CPT

## 2023-10-10 PROCEDURE — 6370000000 HC RX 637 (ALT 250 FOR IP): Performed by: PHYSICIAN ASSISTANT

## 2023-10-10 PROCEDURE — 85025 COMPLETE CBC W/AUTO DIFF WBC: CPT

## 2023-10-10 PROCEDURE — 97166 OT EVAL MOD COMPLEX 45 MIN: CPT

## 2023-10-10 RX ORDER — CYCLOBENZAPRINE HCL 10 MG
10 TABLET ORAL 3 TIMES DAILY PRN
Status: DISCONTINUED | OUTPATIENT
Start: 2023-10-10 | End: 2023-10-11 | Stop reason: HOSPADM

## 2023-10-10 RX ORDER — CEFAZOLIN SODIUM IN 0.9 % NACL 2 G/100 ML
2000 PLASTIC BAG, INJECTION (ML) INTRAVENOUS EVERY 8 HOURS
Status: DISCONTINUED | OUTPATIENT
Start: 2023-10-10 | End: 2023-10-10

## 2023-10-10 RX ORDER — KETOROLAC TROMETHAMINE 30 MG/ML
15 INJECTION, SOLUTION INTRAMUSCULAR; INTRAVENOUS EVERY 6 HOURS PRN
Status: DISCONTINUED | OUTPATIENT
Start: 2023-10-10 | End: 2023-10-11 | Stop reason: HOSPADM

## 2023-10-10 RX ADMIN — ACETAMINOPHEN 650 MG: 325 TABLET ORAL at 19:26

## 2023-10-10 RX ADMIN — OXYCODONE HYDROCHLORIDE 10 MG: 5 TABLET ORAL at 21:40

## 2023-10-10 RX ADMIN — KETOROLAC TROMETHAMINE 15 MG: 30 INJECTION, SOLUTION INTRAMUSCULAR; INTRAVENOUS at 12:07

## 2023-10-10 RX ADMIN — VANCOMYCIN HYDROCHLORIDE 1500 MG: 10 INJECTION, POWDER, LYOPHILIZED, FOR SOLUTION INTRAVENOUS at 06:18

## 2023-10-10 RX ADMIN — ACETAMINOPHEN 650 MG: 325 TABLET ORAL at 15:01

## 2023-10-10 RX ADMIN — Medication 10 ML: at 08:20

## 2023-10-10 RX ADMIN — OXYCODONE HYDROCHLORIDE 10 MG: 5 TABLET ORAL at 15:01

## 2023-10-10 RX ADMIN — Medication 10 ML: at 19:26

## 2023-10-10 RX ADMIN — KETOROLAC TROMETHAMINE 15 MG: 30 INJECTION, SOLUTION INTRAMUSCULAR; INTRAVENOUS at 19:26

## 2023-10-10 RX ADMIN — OXYCODONE HYDROCHLORIDE 10 MG: 5 TABLET ORAL at 10:10

## 2023-10-10 RX ADMIN — POLYETHYLENE GLYCOL 3350 17 G: 17 POWDER, FOR SOLUTION ORAL at 08:13

## 2023-10-10 RX ADMIN — ACETAMINOPHEN 650 MG: 325 TABLET ORAL at 08:12

## 2023-10-10 RX ADMIN — OXYCODONE 5 MG: 5 TABLET ORAL at 06:00

## 2023-10-10 RX ADMIN — MORPHINE SULFATE 4 MG: 4 INJECTION, SOLUTION INTRAMUSCULAR; INTRAVENOUS at 08:11

## 2023-10-10 RX ADMIN — ACETAMINOPHEN 650 MG: 325 TABLET ORAL at 02:47

## 2023-10-10 ASSESSMENT — PAIN DESCRIPTION - ORIENTATION
ORIENTATION: LEFT

## 2023-10-10 ASSESSMENT — PAIN DESCRIPTION - ONSET: ONSET: ON-GOING

## 2023-10-10 ASSESSMENT — PAIN SCALES - GENERAL
PAINLEVEL_OUTOF10: 9
PAINLEVEL_OUTOF10: 7
PAINLEVEL_OUTOF10: 9
PAINLEVEL_OUTOF10: 6
PAINLEVEL_OUTOF10: 6
PAINLEVEL_OUTOF10: 4
PAINLEVEL_OUTOF10: 7
PAINLEVEL_OUTOF10: 7
PAINLEVEL_OUTOF10: 9
PAINLEVEL_OUTOF10: 6
PAINLEVEL_OUTOF10: 9

## 2023-10-10 ASSESSMENT — PAIN DESCRIPTION - DESCRIPTORS
DESCRIPTORS: ACHING;PRESSURE;HEAVINESS
DESCRIPTORS: PRESSURE;HEAVINESS;ACHING
DESCRIPTORS: STABBING;ACHING
DESCRIPTORS: ACHING
DESCRIPTORS: DISCOMFORT;ACHING
DESCRIPTORS: SHOOTING;ACHING
DESCRIPTORS: ACHING;SHARP
DESCRIPTORS: ACHING

## 2023-10-10 ASSESSMENT — PAIN DESCRIPTION - FREQUENCY
FREQUENCY: CONTINUOUS

## 2023-10-10 ASSESSMENT — PAIN DESCRIPTION - PAIN TYPE
TYPE: SURGICAL PAIN

## 2023-10-10 ASSESSMENT — PAIN DESCRIPTION - LOCATION
LOCATION: SHOULDER

## 2023-10-10 ASSESSMENT — PAIN - FUNCTIONAL ASSESSMENT
PAIN_FUNCTIONAL_ASSESSMENT: PREVENTS OR INTERFERES SOME ACTIVE ACTIVITIES AND ADLS

## 2023-10-10 NOTE — CARE COORDINATION
Case Management Assessment  Initial Evaluation    Date/Time of Evaluation: 10/10/2023 3:25 PM  Assessment Completed by: Rashard Khoury RN    If patient is discharged prior to next notation, then this note serves as note for discharge by case management. Patient Name: Leandro Mo                   YOB: 1955  Diagnosis: Bicipital tendinitis of left shoulder [M75.22]  Avascular necrosis of humeral head, left ([address]) [M87.022]  Localized osteoarthritis of left shoulder [M19.012]                   Date / Time: 10/9/2023  1:05 PM    Patient Admission Status: Outpatient in a bed   Readmission Risk (Low < 19, Mod (19-27), High > 27): No data recorded  Current PCP: Paramjit Yancey MD  PCP verified by CM? Yes    Chart Reviewed: Yes      History Provided by: Patient  Patient Orientation: Alert and Oriented, Person, Place, Situation, Self    Patient Cognition: Alert    Hospitalization in the last 30 days (Readmission):  No    If yes, Readmission Assessment in CM Navigator will be completed. Advance Directives:      Code Status: Full Code   Patient's Primary Decision Maker is: Named in Scanned ACP Document    Primary Decision Maker: Ophelia Carrizales - [phone]    Discharge Planning:    Patient lives with: Spouse/Significant Other Type of Home: House  Primary Care Giver: Self  Patient Support Systems include: Spouse/Significant Other, Family Members   Current Financial resources: Medicare  Current community resources: None  Current services prior to admission: None            Current DME:              Type of Home Care services:  None    ADLS  Prior functional level: Independent in ADLs/IADLs  Current functional level: Assistance with the following:, Bathing, Dressing, Toileting, Cooking, Housework, Shopping, Mobility    PT AM-PAC:   /24  OT AM-PAC: 14 /24    Family can provide assistance at DC:  Yes  Would you like Case Management to discuss the discharge plan with any other family

## 2023-10-11 VITALS
OXYGEN SATURATION: 97 % | TEMPERATURE: 98 F | BODY MASS INDEX: 28.25 KG/M2 | WEIGHT: 180 LBS | RESPIRATION RATE: 17 BRPM | DIASTOLIC BLOOD PRESSURE: 93 MMHG | HEART RATE: 72 BPM | HEIGHT: 67 IN | SYSTOLIC BLOOD PRESSURE: 128 MMHG

## 2023-10-11 PROBLEM — Z96.612 S/P REVERSE TOTAL SHOULDER ARTHROPLASTY, LEFT: Status: ACTIVE | Noted: 2023-10-11

## 2023-10-11 LAB
GLUCOSE BLD-MCNC: 128 MG/DL (ref 70–99)
GLUCOSE BLD-MCNC: 91 MG/DL (ref 70–99)
PERFORMED ON: ABNORMAL
PERFORMED ON: NORMAL

## 2023-10-11 PROCEDURE — 6370000000 HC RX 637 (ALT 250 FOR IP): Performed by: PHYSICIAN ASSISTANT

## 2023-10-11 PROCEDURE — 6360000002 HC RX W HCPCS: Performed by: SPECIALIST/TECHNOLOGIST

## 2023-10-11 PROCEDURE — 6370000000 HC RX 637 (ALT 250 FOR IP): Performed by: SPECIALIST/TECHNOLOGIST

## 2023-10-11 PROCEDURE — 1200000000 HC SEMI PRIVATE

## 2023-10-11 PROCEDURE — 3E0T3BZ INTRODUCTION OF ANESTHETIC AGENT INTO PERIPHERAL NERVES AND PLEXI, PERCUTANEOUS APPROACH: ICD-10-PCS | Performed by: ORTHOPAEDIC SURGERY

## 2023-10-11 PROCEDURE — 2580000003 HC RX 258: Performed by: PHYSICIAN ASSISTANT

## 2023-10-11 RX ORDER — ONDANSETRON 4 MG/1
4 TABLET, ORALLY DISINTEGRATING ORAL EVERY 6 HOURS PRN
Status: DISCONTINUED | OUTPATIENT
Start: 2023-10-11 | End: 2023-10-11 | Stop reason: HOSPADM

## 2023-10-11 RX ORDER — ASPIRIN 81 MG/1
81 TABLET ORAL 2 TIMES DAILY
Status: DISCONTINUED | OUTPATIENT
Start: 2023-10-11 | End: 2023-10-11 | Stop reason: HOSPADM

## 2023-10-11 RX ORDER — ONDANSETRON 2 MG/ML
4 INJECTION INTRAMUSCULAR; INTRAVENOUS EVERY 6 HOURS PRN
Status: DISCONTINUED | OUTPATIENT
Start: 2023-10-11 | End: 2023-10-11 | Stop reason: HOSPADM

## 2023-10-11 RX ADMIN — POLYETHYLENE GLYCOL 3350 17 G: 17 POWDER, FOR SOLUTION ORAL at 08:39

## 2023-10-11 RX ADMIN — OXYCODONE HYDROCHLORIDE 10 MG: 5 TABLET ORAL at 11:35

## 2023-10-11 RX ADMIN — ASPIRIN 81 MG: 81 TABLET, COATED ORAL at 08:39

## 2023-10-11 RX ADMIN — KETOROLAC TROMETHAMINE 15 MG: 30 INJECTION, SOLUTION INTRAMUSCULAR; INTRAVENOUS at 03:22

## 2023-10-11 RX ADMIN — ACETAMINOPHEN 650 MG: 325 TABLET ORAL at 03:22

## 2023-10-11 RX ADMIN — ACETAMINOPHEN 650 MG: 325 TABLET ORAL at 08:39

## 2023-10-11 RX ADMIN — ONDANSETRON 4 MG: 4 TABLET, ORALLY DISINTEGRATING ORAL at 11:35

## 2023-10-11 RX ADMIN — OXYCODONE HYDROCHLORIDE 10 MG: 5 TABLET ORAL at 05:48

## 2023-10-11 RX ADMIN — Medication 10 ML: at 08:40

## 2023-10-11 ASSESSMENT — PAIN DESCRIPTION - ORIENTATION
ORIENTATION: LEFT

## 2023-10-11 ASSESSMENT — PAIN DESCRIPTION - FREQUENCY
FREQUENCY: CONTINUOUS
FREQUENCY: CONTINUOUS

## 2023-10-11 ASSESSMENT — PAIN SCALES - GENERAL
PAINLEVEL_OUTOF10: 8
PAINLEVEL_OUTOF10: 4
PAINLEVEL_OUTOF10: 8
PAINLEVEL_OUTOF10: 3
PAINLEVEL_OUTOF10: 7

## 2023-10-11 ASSESSMENT — PAIN DESCRIPTION - LOCATION
LOCATION: SHOULDER

## 2023-10-11 ASSESSMENT — PAIN DESCRIPTION - DESCRIPTORS
DESCRIPTORS: ACHING
DESCRIPTORS: ACHING
DESCRIPTORS: ACHING;DISCOMFORT

## 2023-10-11 ASSESSMENT — PAIN DESCRIPTION - ONSET
ONSET: ON-GOING
ONSET: ON-GOING

## 2023-10-11 ASSESSMENT — PAIN DESCRIPTION - PAIN TYPE
TYPE: SURGICAL PAIN
TYPE: SURGICAL PAIN

## 2023-10-11 NOTE — CARE COORDINATION
CASE MANAGEMENT DISCHARGE SUMMARY      Discharge to: Home w/spouse. Pt denied any needs. Ortho to set up OP PT/OT. IMM given: 10/11/23    New Durable Medical Equipment ordered/agency: n/a    Transportation:    Family/car:private w/spouse    Confirmed discharge plan with:     Patient: yes. Family:  yes.  Chrystal Pro [phone]       RN, name: Alexandre George

## 2023-10-11 NOTE — DISCHARGE SUMMARY
Department of Orthopedic Surgery  Physician Assistant   Discharge Summary    The Светлана Sebastian is a 80 y.o. female underwent left total shoulder replacement procedure without complication. Светлана Sebastian was admitted to the floor following Her recovery in the PACU.      Discharge Diagnosis  left Shoulder Replacement    Current Inpatient Medications    Current Facility-Administered Medications: aspirin EC tablet 81 mg, 81 mg, Oral, BID  ondansetron (ZOFRAN-ODT) disintegrating tablet 4 mg, 4 mg, Oral, Q6H PRN **OR** ondansetron (ZOFRAN) injection 4 mg, 4 mg, IntraVENous, Q6H PRN  ketorolac (TORADOL) injection 15 mg, 15 mg, IntraVENous, Q6H PRN  cyclobenzaprine (FLEXERIL) tablet 10 mg, 10 mg, Oral, TID PRN  scopolamine (TRANSDERM-SCOP) transdermal patch 1 patch, 1 patch, TransDERmal, Once  lactated ringers IV soln infusion, , IntraVENous, Continuous  sodium chloride flush 0.9 % injection 5-40 mL, 5-40 mL, IntraVENous, 2 times per day  sodium chloride flush 0.9 % injection 5-40 mL, 5-40 mL, IntraVENous, PRN  0.9 % sodium chloride infusion, , IntraVENous, PRN  acetaminophen (TYLENOL) tablet 650 mg, 650 mg, Oral, Q6H  oxyCODONE (ROXICODONE) immediate release tablet 5 mg, 5 mg, Oral, Q4H PRN **OR** oxyCODONE (ROXICODONE) immediate release tablet 10 mg, 10 mg, Oral, Q4H PRN  polyethylene glycol (GLYCOLAX) packet 17 g, 17 g, Oral, Daily  insulin NPH (HumuLIN N;NovoLIN N) injection vial 10 Units, 0.125 Units/kg, SubCUTAneous, BID AC  insulin lispro (HUMALOG) injection vial 7 Units, 0.08 Units/kg, SubCUTAneous, TID WC  insulin lispro (HUMALOG) injection vial 0-8 Units, 0-8 Units, SubCUTAneous, TID WC  insulin lispro (HUMALOG) injection vial 0-4 Units, 0-4 Units, SubCUTAneous, Nightly  glucose chewable tablet 16 g, 4 tablet, Oral, PRN  dextrose bolus 10% 125 mL, 125 mL, IntraVENous, PRN **OR** dextrose bolus 10% 250 mL, 250 mL, IntraVENous, PRN  glucagon (rDNA) injection 1 mg, 1 mg, SubCUTAneous, PRN  dextrose 10 %

## 2023-10-12 ENCOUNTER — TELEPHONE (OUTPATIENT)
Dept: ORTHOPEDIC SURGERY | Age: 68
End: 2023-10-12

## 2023-10-12 ENCOUNTER — CARE COORDINATION (OUTPATIENT)
Dept: CASE MANAGEMENT | Age: 68
End: 2023-10-12

## 2023-10-12 DIAGNOSIS — M19.012 LOCALIZED OSTEOARTHRITIS OF LEFT SHOULDER: Primary | ICD-10-CM

## 2023-10-12 PROCEDURE — 1111F DSCHRG MED/CURRENT MED MERGE: CPT | Performed by: INTERNAL MEDICINE

## 2023-10-12 NOTE — CARE COORDINATION
Care Transitions Initial Follow Up Call    Call within 2 business days of discharge: Yes    Patient Current Location: [address] [place] [address] Transition Nurse contacted the patient by telephone to perform post hospital discharge assessment. Verified name and  with patient as identifiers. Provided introduction to self, and explanation of the Care Transition Nurse role. Patient: Minh Cabrales Patient : 1955   MRN: [id number]  Reason for Admission: left total shoulder replacement   Discharge Date: 10/11/23 RARS: No data recorded    Last Discharge Facility       Date Complaint Diagnosis Description Type Department Provider    10/9/23  Localized osteoarthritis of left shoulder Admission (Discharged) Desmond Soria MD            Was this an external facility discharge? No Discharge Facility: n.a    Challenges to be reviewed by the provider   Additional needs identified to be addressed with provider: Yes   Patient with concerns with bathing and support of spouse. CTN [facility] Suresh Lopez to follow up with ortho provider Dr. Volodymyr Mcdaniel.                Method of communication with provider: phone call. CTN spoke with patient who reported she is doing better. Patient reported she has her ice machine configured better and getting more relief now. Patient encouraged to use ice machine 20 mins on and 20 mins off or as tolerated with checking skin at least every 20 mins. Patient reported her dressing is CD&I and she is managing pain fairly well. CTN reviewed all medications with patient and spouse via speaker phone with permission from patient. Patient and spouse, Zaida Candelario, have some concerns regarding Zaida Hawks being able to support patient with bathing. Zaida Candelario reported he has past history of stroke which has left patient with weakness in arm and wrist and they reside in a [place] home and shower is up stairs.  Zaida Candelario reported he fears that he may not be able to fully support patient if she

## 2023-10-12 NOTE — CARE COORDINATION
Follow up on [facility] for patient. CTN contacted [facility] ortho nurse and LVM. CTN contacted Dr. Alcides Castro office and spoke with Carolina Kinney. CTN explained concerns regarding patient's care for self at home and limitations of patient's spouse. CTN requesting HC order for PT/OT. Carolina Kinney reported ESMER Horton reports it is ok. CTN asked if order could be placed in [facility] and CTN able to fax to [facility] agency and Carolina Kinney reported since she would be placing the order in epic she would fax the order herself. CTN did notify Carolina Kinney that [facility] is having trouble staffing PT/OT on the HOSPITAL DISTRICT [address] [place] [address] therefore, may want to try a different agency to prevent delay.      Charo LAUREN, RN, [facility]  Care Transition Nurse  [phone] mobile

## 2023-10-12 NOTE — TELEPHONE ENCOUNTER
Spoke with pt and spouse-Doing a bit better today. Feeling better than she was. Incision status: No drainage or redness    Edema/Swelling/Teds: Minimal and has ice machine, has been using it frequently    Pain level and status: Managing ok     Use of pain medications: Using as needed     Blood thinner: ASA 81mg BID- no issues. Bowels:     Home Care Agency active: Corinne Saucedo is going to work on setting up [facility] [address] for OP PT    Outpatient therapy: na    Do you have all of your medications: Yes    Changes in medications: no    Instructed pt to call Nurse Navigator or surgeon's office with any questions or concerns.      Follow up appointments:    Future Appointments   Date Time Provider [address] [place] [address]   10/26/2023 10:15 AM Jeanne Denny MD AND SKYLAR KIRKPATRICK

## 2023-10-12 NOTE — TELEPHONE ENCOUNTER
[address] patient  - [facility] will be in touch  May  or MAy not qualify for [facility]    jm

## 2023-10-13 NOTE — TELEPHONE ENCOUNTER
General Question     Subject: [address]   Patient and /or Facility Request: Maximino Robles Number: [phone]    PT  CALLING IN DUE TO RECEIVING A CALL ABOUT SOMEONE COMING OUT TO COME HELP WITH CARE. PT  AND PT WOULD LIKE TO CX HOME CARE. PLEASE CALL BACK AT THE ABOVE NUMBER LISTED.

## 2023-10-17 ENCOUNTER — CARE COORDINATION (OUTPATIENT)
Dept: CASE MANAGEMENT | Age: 68
End: 2023-10-17

## 2023-10-17 NOTE — CARE COORDINATION
Care Transitions Outreach Attempt      Attempted to reach patient for transitions of care follow up. Unable to reach patient. LVM on both home and mobile numbers. Patient: Jing Hurt Patient : 1955 MRN: [id number]    Last Discharge Facility       Date Complaint Diagnosis Description Type Department Provider    10/9/23  Localized osteoarthritis of left shoulder Admission (Discharged) Megan Quick MD              Was this an external facility discharge? No Discharge Facility Name: n.a    Noted following upcoming appointments from discharge chart review:   [facility] follow up appointment(s):   Future Appointments   Date Time Provider [address]   10/19/2023  3:00 PM Javier Candelario APRN - CNP [facility] None   10/26/2023 10:15 AM Kenji Garcia MD AND ORTHO [facility]     [facility]  follow up appointment(s):  Mar LAUREN, RN, [facility]  Care Transition Nurse  [phone] mobile

## 2023-10-19 ENCOUNTER — OFFICE VISIT (OUTPATIENT)
Dept: INTERNAL MEDICINE CLINIC | Age: 68
End: 2023-10-19

## 2023-10-19 VITALS
HEIGHT: 67 IN | SYSTOLIC BLOOD PRESSURE: 120 MMHG | WEIGHT: 184 LBS | DIASTOLIC BLOOD PRESSURE: 80 MMHG | RESPIRATION RATE: 14 BRPM | HEART RATE: 68 BPM | BODY MASS INDEX: 28.88 KG/M2

## 2023-10-19 DIAGNOSIS — M75.22 BICEPS TENDINITIS OF LEFT UPPER EXTREMITY: Primary | ICD-10-CM

## 2023-10-19 DIAGNOSIS — Z09 HOSPITAL DISCHARGE FOLLOW-UP: ICD-10-CM

## 2023-10-19 DIAGNOSIS — Z96.612 S/P SHOULDER REPLACEMENT, LEFT: ICD-10-CM

## 2023-10-19 PROCEDURE — 1123F ACP DISCUSS/DSCN MKR DOCD: CPT | Performed by: NURSE PRACTITIONER

## 2023-10-19 PROCEDURE — 99214 OFFICE O/P EST MOD 30 MIN: CPT | Performed by: NURSE PRACTITIONER

## 2023-10-19 PROCEDURE — G8484 FLU IMMUNIZE NO ADMIN: HCPCS | Performed by: NURSE PRACTITIONER

## 2023-10-19 PROCEDURE — 3017F COLORECTAL CA SCREEN DOC REV: CPT | Performed by: NURSE PRACTITIONER

## 2023-10-19 PROCEDURE — 1111F DSCHRG MED/CURRENT MED MERGE: CPT | Performed by: NURSE PRACTITIONER

## 2023-10-19 PROCEDURE — 1090F PRES/ABSN URINE INCON ASSESS: CPT | Performed by: NURSE PRACTITIONER

## 2023-10-19 PROCEDURE — G8427 DOCREV CUR MEDS BY ELIG CLIN: HCPCS | Performed by: NURSE PRACTITIONER

## 2023-10-19 PROCEDURE — G8417 CALC BMI ABV UP PARAM F/U: HCPCS | Performed by: NURSE PRACTITIONER

## 2023-10-19 PROCEDURE — G8399 PT W/DXA RESULTS DOCUMENT: HCPCS | Performed by: NURSE PRACTITIONER

## 2023-10-19 PROCEDURE — 1036F TOBACCO NON-USER: CPT | Performed by: NURSE PRACTITIONER

## 2023-10-19 RX ORDER — CYCLOBENZAPRINE HCL 10 MG
10 TABLET ORAL 3 TIMES DAILY PRN
Qty: 30 TABLET | Refills: 0 | Status: SHIPPED | OUTPATIENT
Start: 2023-10-19 | End: 2023-10-29

## 2023-10-19 RX ORDER — OXYCODONE HYDROCHLORIDE 5 MG/1
5 TABLET ORAL EVERY 6 HOURS PRN
COMMUNITY
End: 2023-10-19 | Stop reason: SDUPTHER

## 2023-10-19 RX ORDER — OXYCODONE HYDROCHLORIDE 5 MG/1
5 TABLET ORAL EVERY 6 HOURS PRN
Qty: 12 TABLET | Refills: 0 | Status: SHIPPED | OUTPATIENT
Start: 2023-10-19 | End: 2023-10-22

## 2023-10-19 ASSESSMENT — ENCOUNTER SYMPTOMS: CONSTIPATION: 1

## 2023-10-19 NOTE — PROGRESS NOTES
breath sounds. No wheezing, rhonchi or rales. Abdominal:      General: Bowel sounds are normal. There is no distension. Palpations: Abdomen is soft. There is no mass. Tenderness: There is no abdominal tenderness. Musculoskeletal:      Cervical back: Neck supple. Right lower leg: No edema. Left lower leg: No edema. Comments: Left shoulder sling   Lymphadenopathy:      Cervical: No cervical adenopathy. Skin:     General: Skin is warm and dry. Findings: No erythema or rash. Neurological:      Mental Status: She is alert and oriented to person, place, and time. Psychiatric:         Mood and Affect: Mood normal.         Behavior: Behavior normal.         Thought Content: Thought content normal.         Judgment: Judgment normal.         Assessment/Plan    S/p left shoulder replacement  -doing well  -is going to follow up with Ortho and starting PT next week  -refilled oxycodone and Flexeril    Constipation  -recommended Colace, Miralax to use as needed    Notify provider for any further questions/concerns    Due for Medicare wellness visit April 2023. An electronic signature was used to authenticate this note.   --CHAPARRITA Stewart - CNP

## 2023-10-20 ENCOUNTER — CARE COORDINATION (OUTPATIENT)
Dept: CASE MANAGEMENT | Age: 68
End: 2023-10-20

## 2023-10-20 NOTE — CARE COORDINATION
Care Transitions Outreach Attempt      Final attempt to reach patient for transitions of care follow up. Unable to reach patient. LVM. Patient: Yas De La Paz Patient : 1955 MRN: [id number]    Last Discharge Facility       Date Complaint Diagnosis Description Type Department Provider    10/9/23  Localized osteoarthritis of left shoulder Admission (Discharged) Darrius Don MD                Noted following upcoming appointments from discharge chart review:   [address] [address] [facility],[address] follow up appointment(s):   Future Appointments   Date Time Provider [address] [place] [address]   10/26/2023 10:15 AM Miguel Hebert MD AND ORTHO [facility]     Non-BS  follow up appointment(s):  Magali LAUREN, RN, [facility]  Care Transition Nurse  [phone] mobile

## 2023-10-25 ENCOUNTER — TELEPHONE (OUTPATIENT)
Dept: ORTHOPEDIC SURGERY | Age: 68
End: 2023-10-25

## 2023-10-25 NOTE — TELEPHONE ENCOUNTER
Attempted to contact pt, left voicemail with purpose and call back number. Cristhian Padron  Orthopedic Nurse Navigator  Phone number: [phone]    Follow up appointments:    Future Appointments   Date Time Provider [address] [place] [address]   10/26/2023 10:15 AM Lia Acosta MD AND ORTHO [facility]     Signed off from pt. Instructed pt to call RN or Surgeon's office with any issues or concerns.

## 2023-10-26 ENCOUNTER — OFFICE VISIT (OUTPATIENT)
Dept: ORTHOPEDIC SURGERY | Age: 68
End: 2023-10-26

## 2023-10-26 VITALS — BODY MASS INDEX: 28.88 KG/M2 | HEIGHT: 67 IN | WEIGHT: 184 LBS

## 2023-10-26 DIAGNOSIS — Z96.612 S/P SHOULDER REPLACEMENT, LEFT: Primary | ICD-10-CM

## 2023-10-26 PROCEDURE — 99024 POSTOP FOLLOW-UP VISIT: CPT | Performed by: ORTHOPAEDIC SURGERY

## 2023-10-26 RX ORDER — HYDROCODONE BITARTRATE AND ACETAMINOPHEN 5; 325 MG/1; MG/1
1 TABLET ORAL EVERY 6 HOURS PRN
Qty: 28 TABLET | Refills: 0 | Status: SHIPPED | OUTPATIENT
Start: 2023-10-26 | End: 2023-11-02

## 2023-10-30 ENCOUNTER — TELEPHONE (OUTPATIENT)
Dept: ORTHOPEDIC SURGERY | Age: 68
End: 2023-10-30

## 2023-10-30 NOTE — TELEPHONE ENCOUNTER
General Question     Subject: RIGHT SHOULDER   Patient and /or Facility Request: Nicolas Vega  Contact Number: [phone]    PATIENT CALLING REQUESTING A CALL BACK FROM SOMEONE IN DR GRANDE'S OFFICE REGARDING HER RIGHT SHOULDER BANDAGE     PATIENT WANTS TO KNOW WHAT SHE SHOULD DO WITH HER BANDAGE     PLEASE CALL TE PATIENT BACK AT THE ABOVE NUMBER

## 2023-11-06 ENCOUNTER — HOSPITAL ENCOUNTER (OUTPATIENT)
Dept: PHYSICAL THERAPY | Age: 68
Setting detail: THERAPIES SERIES
Discharge: HOME | End: 2023-11-06
Payer: MEDICARE

## 2023-11-06 PROCEDURE — 97140 MANUAL THERAPY 1/> REGIONS: CPT | Performed by: PHYSICAL THERAPIST

## 2023-11-06 PROCEDURE — 97161 PT EVAL LOW COMPLEX 20 MIN: CPT | Performed by: PHYSICAL THERAPIST

## 2023-11-06 PROCEDURE — 97110 THERAPEUTIC EXERCISES: CPT | Performed by: PHYSICAL THERAPIST

## 2023-11-06 NOTE — FLOWSHEET NOTE
Therapeutic Activity (93228)  Sets/time                                          Modalities:    No modalities applied this session    Education/Home Exercise Program: Patient HEP program created electronically. Refer to [address] access code:    Access Code: [email or web address]  URL: [email or web address]  Date: 11/06/2023  Prepared by: Laura Al    Exercises  - Standing Single Arm Shoulder Flexion Towel Slide at Table Top  - 2 x daily - 7 x weekly - 1 sets - 10 reps  - Seated Cervical Sidebending Stretch  - 2 x daily - 7 x weekly - 1 sets - 5 reps - 30 hold  - Supported Elbow Flexion Extension PROM  - 2 x daily - 7 x weekly - 1 sets - 10 reps - 10 hold  - Seated Shoulder Shrugs  - 2 x daily - 7 x weekly - 2 sets - 10 reps - 1 - 2 hold  - Seated Scapular Retraction  - 2 x daily - 7 x weekly - 2 sets - 10 reps - 1-2 hold    ASSESSMENT     Today's Assessment: See Eval    Medical Necessity Documentation:  I certify that this patient meets the below criteria necessary for medical necessity for care and/or justification of therapy services: The patient has a musculoskeletal condition(s) with a corresponding ICD-10 code that is of complexity and severity that require skilled therapeutic intervention. This has a direct and significant impact on the need for therapy and significantly impacts the rate of recovery. The patient had a prior episode of outpatient therapy during this calendar year for a different condition. Current diagnosis requires skilled therapeutic intervention.     Treatment/Activity Tolerance:  [x] Patient tolerated treatment well [] Patient limited by fatique  [] Patient limited by pain  [] Patient limited by other medical complications  [] Other:     Return to Play: NA    Prognosis for POC: [x] Good [] Fair  [] Poor    Patient requires continued skilled intervention: [x] Yes  [] No      GOALS     Patient stated goal: Total range of motion  Status: [] Progressing: [] Met: [] Not Met:

## 2023-11-10 ENCOUNTER — HOSPITAL ENCOUNTER (OUTPATIENT)
Dept: PHYSICAL THERAPY | Age: 68
Setting detail: THERAPIES SERIES
Discharge: HOME | End: 2023-11-10
Payer: MEDICARE

## 2023-11-10 PROCEDURE — 97140 MANUAL THERAPY 1/> REGIONS: CPT | Performed by: PHYSICAL THERAPIST

## 2023-11-10 PROCEDURE — 97110 THERAPEUTIC EXERCISES: CPT | Performed by: PHYSICAL THERAPIST

## 2023-11-10 PROCEDURE — 97112 NEUROMUSCULAR REEDUCATION: CPT | Performed by: PHYSICAL THERAPIST

## 2023-11-10 NOTE — FLOWSHEET NOTE
coordination, kinesthetic sense, posture, and/or proprioception for sitting and/or standing activities  (17145) MANUAL THERAPY-  Manual therapy techniques, 1 or more regions, each 15 minutes (Mobilization/manipulation, manual lymphatic drainage, manual traction) for the purpose of modulating pain, promoting relaxation,  increasing ROM, reducing/eliminating soft tissue swelling/inflammation/restriction, improving soft tissue extensibility and allowing for proper ROM for normal function with self care, mobility, lifting and ambulation    TREATMENT PLAN   Plan: Cont POC- Continue emphasis/focus on exercise progression and increasing ROM. Next visit plan to add new exercises     Electronically Signed by Pao Llanes PT, MSPT, OMT-C [id number]             Date: 11/10/2023     Note: If patient does not return for scheduled/recommended follow up visits, this note will serve as a discharge from care along with the most recent update on progress.

## 2023-11-13 ENCOUNTER — HOSPITAL ENCOUNTER (OUTPATIENT)
Dept: PHYSICAL THERAPY | Age: 68
Setting detail: THERAPIES SERIES
Discharge: HOME | End: 2023-11-13
Payer: MEDICARE

## 2023-11-13 PROCEDURE — 97110 THERAPEUTIC EXERCISES: CPT | Performed by: PHYSICAL THERAPIST

## 2023-11-13 PROCEDURE — 97140 MANUAL THERAPY 1/> REGIONS: CPT | Performed by: PHYSICAL THERAPIST

## 2023-11-13 NOTE — FLOWSHEET NOTE
neuromuscular reeducation of movement, balance, coordination, kinesthetic sense, posture, and/or proprioception for sitting and/or standing activities  (04710) MANUAL THERAPY-  Manual therapy techniques, 1 or more regions, each 15 minutes (Mobilization/manipulation, manual lymphatic drainage, manual traction) for the purpose of modulating pain, promoting relaxation,  increasing ROM, reducing/eliminating soft tissue swelling/inflammation/restriction, improving soft tissue extensibility and allowing for proper ROM for normal function with self care, mobility, lifting and ambulation    TREATMENT PLAN   Plan: Cont POC- Continue emphasis/focus on exercise progression and increasing ROM. Next visit plan to add new exercises     Electronically Signed by Nixon Mcarthur PT, MSPT, OMT-C [id number]             Date: 11/13/2023     Note: If patient does not return for scheduled/recommended follow up visits, this note will serve as a discharge from care along with the most recent update on progress.

## 2023-11-17 ENCOUNTER — HOSPITAL ENCOUNTER (OUTPATIENT)
Dept: PHYSICAL THERAPY | Age: 68
Setting detail: THERAPIES SERIES
Discharge: HOME | End: 2023-11-17
Payer: MEDICARE

## 2023-11-17 PROCEDURE — 97110 THERAPEUTIC EXERCISES: CPT | Performed by: PHYSICAL THERAPIST

## 2023-11-17 PROCEDURE — 97140 MANUAL THERAPY 1/> REGIONS: CPT | Performed by: PHYSICAL THERAPIST

## 2023-11-17 PROCEDURE — 97112 NEUROMUSCULAR REEDUCATION: CPT | Performed by: PHYSICAL THERAPIST

## 2023-11-17 NOTE — FLOWSHEET NOTE
[address] and Therapy  [address] office: [phone] fax: [phone]      Physical Therapy: TREATMENT/PROGRESS NOTE   Patient: Carmen Funes (89 y.o. female)   Treatment Date: 2023   :  1955 MRN: [id number]   Visit #:4  Insurance Allowable Auth Needed   BMN []Yes    [x]No    Insurance: Payor: [employer] / Plan: MEDICARE PART A AND B / Product Type: *No Product type* /   Insurance ID: [id number] - (Medicare)  Secondary Insurance (if applicable):    Treatment Diagnosis: Left shoulder pain M25.512      Medical Diagnosis:    S/P shoulder replacement, left [W33.359]   Referring Physician: ESMER Beckwith  PCP: Evelio Escobar MD                             Plan of care signed (Y/N):     Date of Patient follow up with Physician:      Progress Report/POC: NO  POC update due: (10 visits /OR [address], whichever is less)  2023     Precautions/ Contra-indications:                                                                                          Latex allergy:  NO  Pacemaker:    NO  Contraindications for Manipulation: osteoporosis  and recent surgical history (relative)  Date of Surgery: 10/9/23 TSR  Other:      Red Flags:  None    Preferred Language for Healthcare:   [x]English       []other:    SUBJECTIVE EXAMINATION     Patient Report/Comments: pt. Reports that she is a little sore today.      Test used Initial score  2023   Pain Summary VAS 6-8 4-5/10   Functional questionnaire Quick DASH 49=86%    Other:      Shoulder Scaption  115 130   Shoulder ER  0-3 25       OBJECTIVE EXAMINATION     Observation:     Test measurements: see eval    Exercises/Interventions:     Therapeutic Ex (81554)  resistance Sets/time Reps Notes/Cues/Progressions          Seated shoulder rolls  1 20    Seated scap retraction  1 20           Seated self ER at 0     SB flexion RXX,LXX  1 5 ea           Supine cane

## 2023-11-20 ENCOUNTER — HOSPITAL ENCOUNTER (OUTPATIENT)
Dept: PHYSICAL THERAPY | Age: 68
Setting detail: THERAPIES SERIES
Discharge: HOME | End: 2023-11-20
Payer: MEDICARE

## 2023-11-20 PROCEDURE — 97110 THERAPEUTIC EXERCISES: CPT | Performed by: PHYSICAL THERAPIST

## 2023-11-20 PROCEDURE — 97140 MANUAL THERAPY 1/> REGIONS: CPT | Performed by: PHYSICAL THERAPIST

## 2023-11-20 PROCEDURE — 97112 NEUROMUSCULAR REEDUCATION: CPT | Performed by: PHYSICAL THERAPIST

## 2023-11-20 NOTE — FLOWSHEET NOTE
Status: [] Progressing: [] Met: [] Not Met: [] Adjusted  Patient will return to Reaching activities, Bathing/Grooming, Lifting, and Crafts/hobbies without increased symptoms or restriction to work towards return to prior level of function. Status: [] Progressing: [] Met: [] Not Met: [] Adjusted  Patient will increase UE function to allow independence in all self-care activities. Status: [] Progressing: [] Met: [] Not Met: [] Adjusted    Overall Progression Towards Functional goals/ Treatment Progress Update:  [] Patient is progressing as expected towards functional goals listed. [] Progression is slowed due to complexities/Impairments listed. [] Progression has been slowed due to co-morbidities. [x] Plan just implemented, too soon (<30days) to assess goals progression   [] Goals require adjustment due to lack of progress  [] Patient is not progressing as expected and requires additional follow up with physician  [] Other:     CHARGE CAPTURE     PT CHARGE GRID   CPT Code (TIMED) minutes # CPT Code (UNTIMED) #     [x] Therex (31463)  25 1  [] EVAL:LOW (51846 - Typically 20 minutes face-to-face)     [x] Neuromusc. Re-ed (52492) 12 1  [] Re-Eval (68073)     [x] Manual (45759) 18 1  [] Estim Unattended ([id number])     [] Ther. Act (83600)    [] Sherald Daunt.  Traction (48361)     [] Gait (62615)    [] Dry Needle 1-2 muscle (55054)     [] Aquatic Therex (88653)    [] Dry Needle 3+ muscle (15264)     [] Iontophoresis (08756)    [] VASO (47999)     [] Ultrasound (70206)    [] Group Therapy (57513)     [] Estim Attended (69572)    [x] Other:CP L shoulder, HP neck 1   Total Timed Code Tx Minutes 55 3       Total Treatment Minutes 65        Charge Justification:  (71320) THERAPEUTIC EXERCISE - Provided verbal/tactile cueing for activities related to strengthening, flexibility, endurance, ROM performed to

## 2023-11-22 ENCOUNTER — HOSPITAL ENCOUNTER (OUTPATIENT)
Dept: PHYSICAL THERAPY | Age: 68
Setting detail: THERAPIES SERIES
Discharge: HOME | End: 2023-11-22
Payer: MEDICARE

## 2023-11-22 PROCEDURE — 97110 THERAPEUTIC EXERCISES: CPT | Performed by: PHYSICAL THERAPIST

## 2023-11-22 PROCEDURE — 97112 NEUROMUSCULAR REEDUCATION: CPT | Performed by: PHYSICAL THERAPIST

## 2023-11-22 PROCEDURE — 97140 MANUAL THERAPY 1/> REGIONS: CPT | Performed by: PHYSICAL THERAPIST

## 2023-11-22 NOTE — FLOWSHEET NOTE
justification of therapy services: The patient has a musculoskeletal condition(s) with a corresponding ICD-10 code that is of complexity and severity that require skilled therapeutic intervention. This has a direct and significant impact on the need for therapy and significantly impacts the rate of recovery. The patient had a prior episode of outpatient therapy during this calendar year for a different condition. Current diagnosis requires skilled therapeutic intervention. Treatment/Activity Tolerance:  [x] Patient tolerated treatment well [] Patient limited by fatique  [] Patient limited by pain  [] Patient limited by other medical complications  [] Other:     Return to Play: NA    Prognosis for POC: [x] Good [] Fair  [] Poor    Patient requires continued skilled intervention: [x] Yes  [] No      GOALS     Patient stated goal: Total range of motion  Status: [] Progressing: [] Met: [] Not Met: [] Adjusted     Therapist goals for Patient:   Short Term Goals: To be achieved in: 2 weeks  Independent in HEP and progression per patient tolerance, in order to progress toward full function and prevent re-injury. Status: [] Progressing: [] Met: [] Not Met: [] Adjusted  Patient will have a decrease in pain to 2/10 to help  facilitate improvement in movement, function, and ADLs as indicated by functional deficits. Status: [] Progressing: [] Met: [] Not Met: [] Adjusted     Long Term Goals: To be achieved in: 12 weeks  Disability index score of 30% or less for the Quick DASH to assist with return to prior level of function. Status: [] Progressing: [] Met: [] Not Met: [] Adjusted  Improve shoulder AROM to 60 degrees or  better to allow for proper joint functioning as indicated by patients functional deficits.   Status: [] Progressing: [] Met: [] Not Met: [] Adjusted  Pt to improve strength to 4+/5 or better of rotator cuff, scapular retractors, shoulder elevators, biceps, and

## 2023-11-27 ENCOUNTER — HOSPITAL ENCOUNTER (OUTPATIENT)
Dept: PHYSICAL THERAPY | Age: 68
Setting detail: THERAPIES SERIES
Discharge: HOME | End: 2023-11-27
Payer: MEDICARE

## 2023-11-27 PROCEDURE — 97112 NEUROMUSCULAR REEDUCATION: CPT | Performed by: PHYSICAL THERAPIST

## 2023-11-27 PROCEDURE — 97140 MANUAL THERAPY 1/> REGIONS: CPT | Performed by: PHYSICAL THERAPIST

## 2023-11-27 PROCEDURE — 97110 THERAPEUTIC EXERCISES: CPT | Performed by: PHYSICAL THERAPIST

## 2023-11-27 NOTE — FLOWSHEET NOTE
[address] [facility]  [address] [place] [address] office: [phone] fax: [phone]      Physical Therapy: TREATMENT/PROGRESS NOTE   Patient: Светлана Sebastian (11 y.o. female)   Treatment Date: 2023   :  1955 MRN: [id number]   Visit #:7  Insurance Allowable Auth Needed   BMN []Yes    [x]No    Insurance: Payor: Phyllis Cipriano / Plan: MEDICARE PART A AND B / Product Type: *No Product type* /   Insurance ID: [id number] - (Medicare)  Secondary Insurance (if applicable):    Treatment Diagnosis: Left shoulder pain M25.512      Medical Diagnosis:    S/P shoulder replacement, left [N83.315]   Referring Physician: ESMER Mae  PCP: Kourtney Marcial MD                             Plan of care signed (Y/N):     Date of Patient follow up with Physician: 23     Progress Report/POC: NO  POC update due: (10 visits /OR [address], whichever is less)  2023     Precautions/ Contra-indications:                                                                                          Latex allergy:  NO  Pacemaker:    NO  Contraindications for Manipulation: osteoporosis  and recent surgical history (relative)  Date of Surgery: 10/9/23 TSR  Other:      Red Flags:  None    Preferred Language for Healthcare:   [x]English       []other:    SUBJECTIVE EXAMINATION     Patient Report/Comments: pt. Reports that she was not able to do the ex. As well over the weekend, and the shoulder has been feeling very stiff, like a crick.      Test used Initial score  2023   Pain Summary VAS 6-8 4/10   Functional questionnaire Quick DASH 49=86%    Other:      Shoulder Scaption  115 130   Shoulder ER  0-3 25       OBJECTIVE EXAMINATION     Observation: restriction L subscap    Test measurements: see eval    Exercises/Interventions:     Therapeutic Ex (98818)  resistance Sets/time Reps Notes/Cues/Progressions   Table slide, push /pull, circles at

## 2023-12-01 ENCOUNTER — HOSPITAL ENCOUNTER (OUTPATIENT)
Dept: PHYSICAL THERAPY | Age: 68
Setting detail: THERAPIES SERIES
Discharge: HOME | End: 2023-12-01
Payer: MEDICARE

## 2023-12-01 PROCEDURE — 97140 MANUAL THERAPY 1/> REGIONS: CPT | Performed by: PHYSICAL THERAPIST

## 2023-12-01 PROCEDURE — 97112 NEUROMUSCULAR REEDUCATION: CPT | Performed by: PHYSICAL THERAPIST

## 2023-12-01 PROCEDURE — 97110 THERAPEUTIC EXERCISES: CPT | Performed by: PHYSICAL THERAPIST

## 2023-12-01 NOTE — FLOWSHEET NOTE
Minutes 56        Charge Justification:  (62964) THERAPEUTIC EXERCISE - Provided verbal/tactile cueing for activities related to strengthening, flexibility, endurance, ROM performed to prevent loss of range of motion, maintain or improve muscular strength or increase flexibility, following either an injury or surgery. (37894) NEUROMUSCULAR RE-EDUCATION- Therapeutic procedure, 1 or more areas, each 15 minutes; neuromuscular reeducation of movement, balance, coordination, kinesthetic sense, posture, and/or proprioception for sitting and/or standing activities  (98609) MANUAL THERAPY-  Manual therapy techniques, 1 or more regions, each 15 minutes (Mobilization/manipulation, manual lymphatic drainage, manual traction) for the purpose of modulating pain, promoting relaxation,  increasing ROM, reducing/eliminating soft tissue swelling/inflammation/restriction, improving soft tissue extensibility and allowing for proper ROM for normal function with self care, mobility, lifting and ambulation    TREATMENT PLAN   Plan: Cont POC- Continue emphasis/focus on exercise progression and increasing ROM. Next visit plan to add new exercises     Electronically Signed by Trice Galdamez PT, MSPT, OMT-C [id number]             Date: 12/01/2023     Note: If patient does not return for scheduled/recommended follow up visits, this note will serve as a discharge from care along with the most recent update on progress.

## 2023-12-04 ENCOUNTER — HOSPITAL ENCOUNTER (OUTPATIENT)
Dept: PHYSICAL THERAPY | Age: 68
Setting detail: THERAPIES SERIES
Discharge: HOME | End: 2023-12-04
Payer: MEDICARE

## 2023-12-04 PROCEDURE — 97110 THERAPEUTIC EXERCISES: CPT | Performed by: PHYSICAL THERAPIST

## 2023-12-04 PROCEDURE — 97140 MANUAL THERAPY 1/> REGIONS: CPT | Performed by: PHYSICAL THERAPIST

## 2023-12-04 NOTE — FLOWSHEET NOTE
Goals: To be achieved in: 12 weeks  Disability index score of 30% or less for the Quick DASH to assist with return to prior level of function. Status: [x] Progressing: [] Met: [] Not Met: [] Adjusted  Improve shoulder AROM to 60 degrees or  better to allow for proper joint functioning as indicated by patients functional deficits. Status: [x] Progressing: [] Met: [] Not Met: [] Adjusted  Pt to improve strength to 4+/5 or better of rotator cuff, scapular retractors, shoulder elevators, biceps, and triceps to allow for proper muscle and joint use in functional mobility, ADLs and prior level of function   Status: [x] Progressing: [] Met: [] Not Met: [] Adjusted  Patient will return to Reaching activities, Bathing/Grooming, Lifting, and Crafts/hobbies without increased symptoms or restriction to work towards return to prior level of function. Status: [x] Progressing: [] Met: [] Not Met: [] Adjusted  Patient will increase UE function to allow independence in all self-care activities. Status: [x] Progressing: [] Met: [] Not Met: [] Adjusted    Overall Progression Towards Functional goals/ Treatment Progress Update:  [] Patient is progressing as expected towards functional goals listed. [x] Progression is slowed due to complexities/Impairments listed. [] Progression has been slowed due to co-morbidities. [] Plan just implemented, too soon (<30days) to assess goals progression   [] Goals require adjustment due to lack of progress  [] Patient is not progressing as expected and requires additional follow up with physician  [] Other:     CHARGE CAPTURE     PT CHARGE GRID   CPT Code (TIMED) minutes # CPT Code (UNTIMED) #     [x] Therex (83867)  25 2  [] EVAL:LOW (84682 - Typically 20 minutes face-to-face)     [] Neuromusc.  Re-ed (73861)    [] Re-Eval (02390)     [x] Manual

## 2023-12-07 ENCOUNTER — OFFICE VISIT (OUTPATIENT)
Dept: ORTHOPEDIC SURGERY | Age: 68
End: 2023-12-07

## 2023-12-07 VITALS — WEIGHT: 184 LBS | BODY MASS INDEX: 28.88 KG/M2 | HEIGHT: 67 IN

## 2023-12-07 DIAGNOSIS — Z96.612 S/P SHOULDER REPLACEMENT, LEFT: Primary | ICD-10-CM

## 2023-12-07 PROCEDURE — 99024 POSTOP FOLLOW-UP VISIT: CPT | Performed by: PHYSICIAN ASSISTANT

## 2023-12-08 ENCOUNTER — HOSPITAL ENCOUNTER (OUTPATIENT)
Dept: PHYSICAL THERAPY | Age: 68
Setting detail: THERAPIES SERIES
Discharge: HOME | End: 2023-12-08
Payer: MEDICARE

## 2023-12-08 PROCEDURE — 97140 MANUAL THERAPY 1/> REGIONS: CPT | Performed by: PHYSICAL THERAPIST

## 2023-12-08 PROCEDURE — G0283 ELEC STIM OTHER THAN WOUND: HCPCS | Performed by: PHYSICAL THERAPIST

## 2023-12-08 PROCEDURE — 97110 THERAPEUTIC EXERCISES: CPT | Performed by: PHYSICAL THERAPIST

## 2023-12-08 NOTE — FLOWSHEET NOTE
L shoulder        Table slide, push /pull, circles at finisher, 45's 0# 2 10 ea    Seated shoulder rolls  HEP     Seated scap retraction  HEP                             Supine cane press 0#  With 3#  3  2 10  10    Supine cane flexion  (:10) 2 10    Self AAROM shoulder flexion with hands clasped       Supine bicep curl 1# 3 10 Towel under    Supine cane ER at 45  :10 10    Supine press  2 10    Supine flexion to 90  1 10    SL ER  2 10    SL abd short lever  1 10    Prone row  2 10    Prone shoulder ext  2 10    TB row  HEP  RTB   TB ER walk out  :10 10 Max cues for form   Wall crawl flexion  :10 10    Sub max ER with ring F, Ext iso, added IR supine with ring  HEP  ER, IR, ext with ring, F with PT MR at 90          Manual Intervention (59067)  TIME            GHJ mobs inf and post glide, subscap release,  10'     PROM flexion/scaption, abduction and ER   10'     Scar mobilization            IASTM bicep             NMR re-education (79563) resistance Sets/time Reps CUES NEEDED                                        Modalities:    Electrical Stimulation: PreMod Applied to Shoulder Left for pain modulation and symptom control for 15 minutes  Cold Pack    Education/Home Exercise Program: Patient HEP program created electronically. Refer to Isabelle Blanchard access code:    Access Code: [email or web address]  URL: [email or web address]  Date: 11/06/2023  Prepared by: Lucrecia Fuentes    Exercises  - Standing Single Arm Shoulder Flexion Towel Slide at Table Top  - 2 x daily - 7 x weekly - 1 sets - 10 reps  - Seated Cervical Sidebending Stretch  - 2 x daily - 7 x weekly - 1 sets - 5 reps - 30 hold  - Supported Elbow Flexion Extension PROM  - 2 x daily - 7 x weekly - 1 sets - 10 reps - 10 hold  - Seated Shoulder Shrugs  - 2 x daily - 7 x weekly - 2 sets - 10 reps - 1 - 2 hold  - Seated Scapular Retraction  - 2 x daily - 7 x weekly - 2 sets - 10 reps - 1-2 hold    ASSESSMENT     Today's Assessment: Patient had good tolerance to

## 2023-12-11 ENCOUNTER — HOSPITAL ENCOUNTER (OUTPATIENT)
Dept: PHYSICAL THERAPY | Age: 68
Setting detail: THERAPIES SERIES
Discharge: HOME | End: 2023-12-11
Payer: MEDICARE

## 2023-12-11 PROCEDURE — 97140 MANUAL THERAPY 1/> REGIONS: CPT | Performed by: PHYSICAL THERAPIST

## 2023-12-11 PROCEDURE — 97110 THERAPEUTIC EXERCISES: CPT | Performed by: PHYSICAL THERAPIST

## 2023-12-12 ENCOUNTER — OFFICE VISIT (OUTPATIENT)
Dept: ORTHOPEDIC SURGERY | Age: 68
End: 2023-12-12

## 2023-12-12 VITALS — WEIGHT: 184.08 LBS | BODY MASS INDEX: 28.89 KG/M2 | HEIGHT: 67 IN

## 2023-12-12 DIAGNOSIS — Z96.612 S/P SHOULDER REPLACEMENT, LEFT: Primary | ICD-10-CM

## 2023-12-12 PROCEDURE — 99024 POSTOP FOLLOW-UP VISIT: CPT | Performed by: ORTHOPAEDIC SURGERY

## 2023-12-15 ENCOUNTER — HOSPITAL ENCOUNTER (OUTPATIENT)
Dept: PHYSICAL THERAPY | Age: 68
Setting detail: THERAPIES SERIES
Discharge: HOME | End: 2023-12-15
Payer: MEDICARE

## 2023-12-15 PROCEDURE — 97110 THERAPEUTIC EXERCISES: CPT | Performed by: PHYSICAL THERAPIST

## 2023-12-15 PROCEDURE — 97140 MANUAL THERAPY 1/> REGIONS: CPT | Performed by: PHYSICAL THERAPIST

## 2023-12-15 NOTE — FLOWSHEET NOTE
[address] [facility]  [address] office: [phone] fax: [phone]      Physical Therapy: TREATMENT/PROGRESS NOTE   Patient: Dianne Pagan (29 y.o. female)   Treatment Date: 12/15/2023   :  1955 MRN: [id number]   Visit #:12  Insurance Allowable Auth Needed   BMN []Yes    [x]No    Insurance: Payor: Odalys Perez / Plan: MEDICARE PART A AND B / Product Type: *No Product type* /   Insurance ID: [id number] - (Medicare)  Secondary Insurance (if applicable):    Treatment Diagnosis: Left shoulder pain M25.512      Medical Diagnosis:    S/P shoulder replacement, left [Y10.508]   Referring Physician: ESMER Dwyer  PCP: Stephane Vazquez MD                             Plan of care signed (Y/N):     Date of Patient follow up with Physician: 23     Progress Report/POC: NO  POC update due: (10 visits /OR [address], whichever is less)  2023     Precautions/ Contra-indications:                                                                                          Latex allergy:  NO  Pacemaker:    NO  Contraindications for Manipulation: osteoporosis  and recent surgical history (relative)  Date of Surgery: 10/9/23 TSR  Other:      Red Flags:  None    Preferred Language for Healthcare:   [x]English       []other:    SUBJECTIVE EXAMINATION     Patient Report/Comments: pt. Reports that she went to the doctor last week and the shoulder was very stiff and he was concerned about her falling behind, and so she worked hard over the weekend       Test used Initial score  2023   Pain Summary VAS 6-8 2/10   Functional questionnaire Quick DASH 49=86% 45=77%   Other:      Shoulder flexion   135   Shoulder Scaption  115 142   Shoulder ER  0-3 40       OBJECTIVE EXAMINATION     Observation: restriction L subscap    Test measurements: see above    Exercises/Interventions:     Therapeutic Ex (11303)  resistance

## 2023-12-29 ENCOUNTER — HOSPITAL ENCOUNTER (OUTPATIENT)
Dept: PHYSICAL THERAPY | Age: 68
Setting detail: THERAPIES SERIES
Discharge: HOME | End: 2023-12-29
Payer: MEDICARE

## 2023-12-29 PROCEDURE — 97110 THERAPEUTIC EXERCISES: CPT | Performed by: PHYSICAL THERAPIST

## 2023-12-29 PROCEDURE — 97140 MANUAL THERAPY 1/> REGIONS: CPT | Performed by: PHYSICAL THERAPIST

## 2023-12-29 NOTE — FLOWSHEET NOTE
indicated by patients functional deficits. Status: [x] Progressing: [] Met: [] Not Met: [] Adjusted  Pt to improve strength to 4+/5 or better of rotator cuff, scapular retractors, shoulder elevators, biceps, and triceps to allow for proper muscle and joint use in functional mobility, ADLs and prior level of function   Status: [x] Progressing: [] Met: [] Not Met: [] Adjusted  Patient will return to Reaching activities, Bathing/Grooming, Lifting, and Crafts/hobbies without increased symptoms or restriction to work towards return to prior level of function. Status: [x] Progressing: [] Met: [] Not Met: [] Adjusted  Patient will increase UE function to allow independence in all self-care activities. Status: [x] Progressing: [] Met: [] Not Met: [] Adjusted    Overall Progression Towards Functional goals/ Treatment Progress Update:  [] Patient is progressing as expected towards functional goals listed. [x] Progression is slowed due to complexities/Impairments listed. [] Progression has been slowed due to co-morbidities. [] Plan just implemented, too soon (<30days) to assess goals progression   [] Goals require adjustment due to lack of progress  [] Patient is not progressing as expected and requires additional follow up with physician  [] Other:     CHARGE CAPTURE     PT CHARGE GRID   CPT Code (TIMED) minutes # CPT Code (UNTIMED) #     [x] Therex (53705)  25 2  [] EVAL:LOW (99327 - Typically 20 minutes face-to-face)     [] Neuromusc. Re-ed (05550)    [] Re-Eval (08155)     [x] Manual (89510) 20 1  [] Estim Unattended (43572)     [] Ther. Act (35868)    [] Sherald Daunt.  Traction (14683)     [] Gait (61485)    [] Dry Needle 1-2 muscle (36309)     [] Aquatic Therex (36233)    [] Dry Needle 3+ muscle (45866)     [] Iontophoresis (93699)    [] VASO (88404)     [] Ultrasound (85983)    [] Group Therapy

## 2024-01-02 ENCOUNTER — HOSPITAL ENCOUNTER (OUTPATIENT)
Dept: PHYSICAL THERAPY | Age: 69
Setting detail: THERAPIES SERIES
Discharge: HOME | End: 2024-01-02
Payer: MEDICARE

## 2024-01-02 PROCEDURE — 97110 THERAPEUTIC EXERCISES: CPT | Performed by: PHYSICAL THERAPIST

## 2024-01-02 PROCEDURE — 97140 MANUAL THERAPY 1/> REGIONS: CPT | Performed by: PHYSICAL THERAPIST

## 2024-01-02 NOTE — FLOWSHEET NOTE
[facility]- Outpatient Rehabilitation and Therapy  [address], [place] [address] office: [phone] fax: [phone]    MEDICARE CAP EXCEPTION DOCUMENTATION      I certify that this patient meets one of the below criteria necessary for becoming an exception to the Medicare cap on therapy services:    [x]  The patient has a condition identified by an ICD-10 code that has a direct and significant impact on the need for therapy. (Significantly impacts the rate of recovery.)                   []  The patient has a complexity identified by an ICD-10 code that has a direct and significant impact on the need for therapy.  (Significantly impacts the rate of recovery and is associated with a primary condition.)         []  The patient has associated variables that influence the amount of treatment to include:  Social support, self-efficacy/motivation, prognosis, time since onset/acuity.         []  The patient has generalized musculoskeletal conditions or a condition affecting multiple sites that will have a direct impact on the rate of recovery.    [x]  The patient had a prior episode of outpatient therapy during this calendar year for a different condition.           []  The patient has a mental or cognitive disorder in addition to the condition being treated that will have a direct and significant impact on the rate of recovery.           Physical Therapy: TREATMENT/PROGRESS NOTE   Patient: Marley Otero (68 y.o. female)   Treatment Date: 2024   :  1955 MRN: [id number]   Visit #:18  Insurance Allowable Auth Needed   BMN []Yes    [x]No    Insurance: Payor: MEDICARE / Plan: MEDICARE PART A AND B / Product Type: *No Product type* /   Insurance ID: [id number] - (Medicare)  Secondary Insurance (if applicable):    Treatment Diagnosis: Left shoulder pain M25.512      Medical Diagnosis:    S/P shoulder replacement, left [Z96.612]   Referring Physician: Juan Rojas PA  PCP:

## 2024-01-05 ENCOUNTER — HOSPITAL ENCOUNTER (OUTPATIENT)
Dept: PHYSICAL THERAPY | Age: 69
Setting detail: THERAPIES SERIES
Discharge: HOME | End: 2024-01-05
Payer: MEDICARE

## 2024-01-05 PROCEDURE — 97140 MANUAL THERAPY 1/> REGIONS: CPT | Performed by: PHYSICAL THERAPIST

## 2024-01-05 PROCEDURE — 97110 THERAPEUTIC EXERCISES: CPT | Performed by: PHYSICAL THERAPIST

## 2024-01-05 NOTE — FLOWSHEET NOTE
Attended (05127)    [x] Other:CP L shoulder, / Heat to neck 1   Total Timed Code Tx Minutes 45' 3       Total Treatment Minutes 55'        Charge Justification:  (98325) THERAPEUTIC EXERCISE - Provided verbal/tactile cueing for activities related to strengthening, flexibility, endurance, ROM performed to prevent loss of range of motion, maintain or improve muscular strength or increase flexibility, following either an injury or surgery.   (22353) NEUROMUSCULAR RE-EDUCATION- Therapeutic procedure, 1 or more areas, each 15 minutes; neuromuscular reeducation of movement, balance, coordination, kinesthetic sense, posture, and/or proprioception for sitting and/or standing activities  (79179) MANUAL THERAPY-  Manual therapy techniques, 1 or more regions, each 15 minutes (Mobilization/manipulation, manual lymphatic drainage, manual traction) for the purpose of modulating pain, promoting relaxation,  increasing ROM, reducing/eliminating soft tissue swelling/inflammation/restriction, improving soft tissue extensibility and allowing for proper ROM for normal function with self care, mobility, lifting and ambulation    TREATMENT PLAN   Plan: Cont POC- Continue emphasis/focus on exercise progression, improving proper muscle recruitment and activation/motor control patterns, increasing ROM, and allowing for proper ROM. Next visit plan to progress reps and add new exercises     Electronically Signed by Clau Bynum PT, MSPT, OMT-C [id number]               Date: 01/05/2024     Note: If patient does not return for scheduled/recommended follow up visits, this note will serve as a discharge from care along with the most recent update on progress.

## 2024-01-08 ENCOUNTER — HOSPITAL ENCOUNTER (OUTPATIENT)
Dept: PHYSICAL THERAPY | Age: 69
Setting detail: THERAPIES SERIES
Discharge: HOME | End: 2024-01-08
Payer: MEDICARE

## 2024-01-08 PROCEDURE — 97110 THERAPEUTIC EXERCISES: CPT | Performed by: PHYSICAL THERAPIST

## 2024-01-08 PROCEDURE — 97140 MANUAL THERAPY 1/> REGIONS: CPT | Performed by: PHYSICAL THERAPIST

## 2024-01-08 NOTE — FLOWSHEET NOTE
no hiking or compensatory movement during supine ex.  Able to progress reps and resistance with several supine ex.    Medical Necessity Documentation:  I certify that this patient meets the below criteria necessary for medical necessity for care and/or justification of therapy services:  The patient has a musculoskeletal condition(s) with a corresponding ICD-10 code that is of complexity and severity that require skilled therapeutic intervention. This has a direct and significant impact on the need for therapy and significantly impacts the rate of recovery.   The patient had a prior episode of outpatient therapy during this calendar year for a different condition.  Current diagnosis requires skilled therapeutic intervention.    Treatment/Activity Tolerance:  [x] Patient tolerated treatment well [] Patient limited by fatique  [] Patient limited by pain  [] Patient limited by other medical complications  [] Other:     Return to Play: NA    Prognosis for POC: [x] Good [] Fair  [] Poor    Patient requires continued skilled intervention: [x] Yes  [] No      GOALS     Patient stated goal: Total range of motion  Status: [x] Progressing: [] Met: [] Not Met: [] Adjusted     Therapist goals for Patient:   Short Term Goals: To be achieved in: 2 weeks  Independent in HEP and progression per patient tolerance, in order to progress toward full function and prevent re-injury.               Status: [] Progressing: [x] Met: [] Not Met: [] Adjusted  Patient will have a decrease in pain to 2/10 to help  facilitate improvement in movement, function, and ADLs as indicated by functional deficits.              Status: [] Progressing: [x] Met: [] Not Met: [] Adjusted     Long Term Goals: To be achieved in: 12 weeks  Disability index score of 30% or less for the Quick DASH to assist with return to prior level of function.                 Status: [x] Progressing: [] Met: [] Not Met: [] Adjusted  Improve shoulder AROM to 60 degrees or  better

## 2024-01-12 ENCOUNTER — HOSPITAL ENCOUNTER (OUTPATIENT)
Dept: PHYSICAL THERAPY | Age: 69
Setting detail: THERAPIES SERIES
Discharge: HOME | End: 2024-01-12
Payer: MEDICARE

## 2024-01-12 PROCEDURE — 97110 THERAPEUTIC EXERCISES: CPT | Performed by: PHYSICAL THERAPIST

## 2024-01-12 PROCEDURE — 97140 MANUAL THERAPY 1/> REGIONS: CPT | Performed by: PHYSICAL THERAPIST

## 2024-01-12 NOTE — FLOWSHEET NOTE
[facility]- Outpatient Rehabilitation and Therapy  [address], [place] [address] office: [phone] fax: [phone]    MEDICARE CAP EXCEPTION DOCUMENTATION      I certify that this patient meets one of the below criteria necessary for becoming an exception to the Medicare cap on therapy services:    [x]  The patient has a condition identified by an ICD-10 code that has a direct and significant impact on the need for therapy. (Significantly impacts the rate of recovery.)                   []  The patient has a complexity identified by an ICD-10 code that has a direct and significant impact on the need for therapy.  (Significantly impacts the rate of recovery and is associated with a primary condition.)         []  The patient has associated variables that influence the amount of treatment to include:  Social support, self-efficacy/motivation, prognosis, time since onset/acuity.         []  The patient has generalized musculoskeletal conditions or a condition affecting multiple sites that will have a direct impact on the rate of recovery.    [x]  The patient had a prior episode of outpatient therapy during this calendar year for a different condition.           []  The patient has a mental or cognitive disorder in addition to the condition being treated that will have a direct and significant impact on the rate of recovery.           Physical Therapy: TREATMENT/PROGRESS NOTE   Patient: Marley Otero (68 y.o. female)   Treatment Date: 2024   :  1955 MRN: [id number]   Visit #:4 (16 In )  Insurance Allowable Auth Needed   BMN []Yes    [x]No    Insurance: Payor: MEDICARE / Plan: MEDICARE PART A AND B / Product Type: *No Product type* /   Insurance ID: [id number] - (Medicare)  Secondary Insurance (if applicable):    Treatment Diagnosis: Left shoulder pain M25.512      Medical Diagnosis:    S/P shoulder replacement, left [Z96.612]   Referring Physician: Bob

## 2024-01-15 ENCOUNTER — HOSPITAL ENCOUNTER (OUTPATIENT)
Dept: PHYSICAL THERAPY | Age: 69
Setting detail: THERAPIES SERIES
Discharge: HOME | End: 2024-01-15
Payer: MEDICARE

## 2024-01-15 PROCEDURE — 97140 MANUAL THERAPY 1/> REGIONS: CPT | Performed by: PHYSICAL THERAPIST

## 2024-01-15 PROCEDURE — 97110 THERAPEUTIC EXERCISES: CPT | Performed by: PHYSICAL THERAPIST

## 2024-01-15 NOTE — FLOWSHEET NOTE
Status: [x] Progressing: [] Met: [] Not Met: [] Adjusted  Improve shoulder AROM to 60 degrees or  better to allow for proper joint functioning as indicated by patients functional deficits.  Status: [x] Progressing: [] Met: [] Not Met: [] Adjusted  Pt to improve strength to 4+/5 or better of rotator cuff, scapular retractors, shoulder elevators, biceps, and triceps to allow for proper muscle and joint use in functional mobility, ADLs and prior level of function   Status: [x] Progressing: [] Met: [] Not Met: [] Adjusted  Patient will return to Reaching activities, Bathing/Grooming, Lifting, and Crafts/hobbies without increased symptoms or restriction to work towards return to prior level of function.                         Status: [x] Progressing: [] Met: [] Not Met: [] Adjusted  Patient will increase UE function to allow independence in all self-care activities.                                                                                                           Status: [x] Progressing: [] Met: [] Not Met: [] Adjusted    Overall Progression Towards Functional goals/ Treatment Progress Update:  [] Patient is progressing as expected towards functional goals listed.    [x] Progression is slowed due to complexities/Impairments listed.  [] Progression has been slowed due to co-morbidities.  [] Plan just implemented, too soon (<30days) to assess goals progression   [] Goals require adjustment due to lack of progress  [] Patient is not progressing as expected and requires additional follow up with physician  [] Other:     CHARGE CAPTURE     PT CHARGE GRID   CPT Code (TIMED) minutes # CPT Code (UNTIMED) #     [x] Therex (53234)  25 2  [] EVAL:LOW (02786 - Typically 20 minutes face-to-face)     [] Neuromusc. Re-ed (65914)    [] Re-Eval (29698)     [x] Manual (23396) 20 1  [] Estim Unattended (37115)     [] Ther. Act (58808)    [] Mech. Traction (37164)     [] Gait (43349)    [] Dry Needle 1-2 muscle (61571)

## 2024-01-19 ENCOUNTER — HOSPITAL ENCOUNTER (OUTPATIENT)
Dept: PHYSICAL THERAPY | Age: 69
Setting detail: THERAPIES SERIES
Discharge: HOME | End: 2024-01-19
Payer: MEDICARE

## 2024-01-19 ENCOUNTER — APPOINTMENT (OUTPATIENT)
Dept: PHYSICAL THERAPY | Age: 69
End: 2024-01-19
Payer: MEDICARE

## 2024-01-19 PROCEDURE — 97110 THERAPEUTIC EXERCISES: CPT | Performed by: PHYSICAL THERAPIST

## 2024-01-19 PROCEDURE — 97140 MANUAL THERAPY 1/> REGIONS: CPT | Performed by: PHYSICAL THERAPIST

## 2024-01-19 NOTE — FLOWSHEET NOTE
[facility]- Outpatient Rehabilitation and Therapy  [address], [place] [address] office: [phone] fax: [phone]    MEDICARE CAP EXCEPTION DOCUMENTATION      I certify that this patient meets one of the below criteria necessary for becoming an exception to the Medicare cap on therapy services:    [x]  The patient has a condition identified by an ICD-10 code that has a direct and significant impact on the need for therapy. (Significantly impacts the rate of recovery.)                   []  The patient has a complexity identified by an ICD-10 code that has a direct and significant impact on the need for therapy.  (Significantly impacts the rate of recovery and is associated with a primary condition.)         []  The patient has associated variables that influence the amount of treatment to include:  Social support, self-efficacy/motivation, prognosis, time since onset/acuity.         []  The patient has generalized musculoskeletal conditions or a condition affecting multiple sites that will have a direct impact on the rate of recovery.    []  The patient had a prior episode of outpatient therapy during this calendar year for a different condition.           []  The patient has a mental or cognitive disorder in addition to the condition being treated that will have a direct and significant impact on the rate of recovery.           Physical Therapy: TREATMENT/PROGRESS NOTE   Patient: Marley Otero (68 y.o. female)   Treatment Date: 2024   :  1955 MRN: [id number]   Visit #:6 (16 In )  Insurance Allowable Auth Needed   BMN []Yes    [x]No    Insurance: Payor: MEDICARE / Plan: MEDICARE PART A AND B / Product Type: *No Product type* /   Insurance ID: [id number] - (Medicare)  Secondary Insurance (if applicable):    Treatment Diagnosis: Left shoulder pain M25.512      Medical Diagnosis:    S/P shoulder replacement, left [Z96.612]   Referring Physician: Bob

## 2024-01-22 ENCOUNTER — HOSPITAL ENCOUNTER (OUTPATIENT)
Dept: PHYSICAL THERAPY | Age: 69
Setting detail: THERAPIES SERIES
Discharge: HOME | End: 2024-01-22
Payer: MEDICARE

## 2024-01-22 PROCEDURE — 97110 THERAPEUTIC EXERCISES: CPT | Performed by: PHYSICAL THERAPIST

## 2024-01-22 PROCEDURE — 97140 MANUAL THERAPY 1/> REGIONS: CPT | Performed by: PHYSICAL THERAPIST

## 2024-01-22 NOTE — FLOWSHEET NOTE
3       Total Treatment Minutes 50'        Charge Justification:  (27415) THERAPEUTIC EXERCISE - Provided verbal/tactile cueing for activities related to strengthening, flexibility, endurance, ROM performed to prevent loss of range of motion, maintain or improve muscular strength or increase flexibility, following either an injury or surgery.   (38929) NEUROMUSCULAR RE-EDUCATION- Therapeutic procedure, 1 or more areas, each 15 minutes; neuromuscular reeducation of movement, balance, coordination, kinesthetic sense, posture, and/or proprioception for sitting and/or standing activities  (76847) MANUAL THERAPY-  Manual therapy techniques, 1 or more regions, each 15 minutes (Mobilization/manipulation, manual lymphatic drainage, manual traction) for the purpose of modulating pain, promoting relaxation,  increasing ROM, reducing/eliminating soft tissue swelling/inflammation/restriction, improving soft tissue extensibility and allowing for proper ROM for normal function with self care, mobility, lifting and ambulation    TREATMENT PLAN   Plan: Cont POC- Continue emphasis/focus on exercise progression, improving proper muscle recruitment and activation/motor control patterns, increasing ROM, and allowing for proper ROM. Next visit plan to progress reps and add new exercises     Electronically Signed by Clau Bynum PT, MSPT, OMT-C [id number]                 Date: 01/22/2024     Note: If patient does not return for scheduled/recommended follow up visits, this note will serve as a discharge from care along with the most recent update on progress.

## 2024-01-26 ENCOUNTER — HOSPITAL ENCOUNTER (OUTPATIENT)
Dept: PHYSICAL THERAPY | Age: 69
Setting detail: THERAPIES SERIES
Discharge: HOME | End: 2024-01-26
Payer: MEDICARE

## 2024-01-26 PROCEDURE — 97110 THERAPEUTIC EXERCISES: CPT

## 2024-01-26 PROCEDURE — 97140 MANUAL THERAPY 1/> REGIONS: CPT

## 2024-01-26 NOTE — FLOWSHEET NOTE
[facility]- Outpatient Rehabilitation and Therapy  [address], [place] [address] office: [phone] fax: [phone]    MEDICARE CAP EXCEPTION DOCUMENTATION      I certify that this patient meets one of the below criteria necessary for becoming an exception to the Medicare cap on therapy services:    [x]  The patient has a condition identified by an ICD-10 code that has a direct and significant impact on the need for therapy. (Significantly impacts the rate of recovery.)                   []  The patient has a complexity identified by an ICD-10 code that has a direct and significant impact on the need for therapy.  (Significantly impacts the rate of recovery and is associated with a primary condition.)         []  The patient has associated variables that influence the amount of treatment to include:  Social support, self-efficacy/motivation, prognosis, time since onset/acuity.         []  The patient has generalized musculoskeletal conditions or a condition affecting multiple sites that will have a direct impact on the rate of recovery.    []  The patient had a prior episode of outpatient therapy during this calendar year for a different condition.           []  The patient has a mental or cognitive disorder in addition to the condition being treated that will have a direct and significant impact on the rate of recovery.           Physical Therapy: TREATMENT/PROGRESS NOTE   Patient: Marley Otero (68 y.o. female)   Treatment Date: 2024   :  1955 MRN: [id number]   Visit #:7 (16 In )  Insurance Allowable Auth Needed   BMN []Yes    [x]No    Insurance: Payor: MEDICARE / Plan: MEDICARE PART A AND B / Product Type: *No Product type* /   Insurance ID: [id number] - (Medicare)  Secondary Insurance (if applicable):    Treatment Diagnosis: Left shoulder pain M25.512      Medical Diagnosis:    S/P shoulder replacement, left [Z96.612]   Referring Physician: Bob

## 2024-01-29 ENCOUNTER — HOSPITAL ENCOUNTER (OUTPATIENT)
Dept: PHYSICAL THERAPY | Age: 69
Setting detail: THERAPIES SERIES
Discharge: HOME | End: 2024-01-29
Payer: MEDICARE

## 2024-01-29 PROCEDURE — 97110 THERAPEUTIC EXERCISES: CPT | Performed by: PHYSICAL THERAPIST

## 2024-01-29 PROCEDURE — 97140 MANUAL THERAPY 1/> REGIONS: CPT | Performed by: PHYSICAL THERAPIST

## 2024-01-29 NOTE — FLOWSHEET NOTE
(94029)     [] Iontophoresis (84749)    [] VASO (14890)     [] Ultrasound (55676)    [] Group Therapy (56115)     [] Estim Attended (37837)    [x] Other:CP L shoulder, / Heat to neck 1   Total Timed Code Tx Minutes 45' 3       Total Treatment Minutes 55'        Charge Justification:  (83854) THERAPEUTIC EXERCISE - Provided verbal/tactile cueing for activities related to strengthening, flexibility, endurance, ROM performed to prevent loss of range of motion, maintain or improve muscular strength or increase flexibility, following either an injury or surgery.   (48753) NEUROMUSCULAR RE-EDUCATION- Therapeutic procedure, 1 or more areas, each 15 minutes; neuromuscular reeducation of movement, balance, coordination, kinesthetic sense, posture, and/or proprioception for sitting and/or standing activities  (63259) MANUAL THERAPY-  Manual therapy techniques, 1 or more regions, each 15 minutes (Mobilization/manipulation, manual lymphatic drainage, manual traction) for the purpose of modulating pain, promoting relaxation,  increasing ROM, reducing/eliminating soft tissue swelling/inflammation/restriction, improving soft tissue extensibility and allowing for proper ROM for normal function with self care, mobility, lifting and ambulation    TREATMENT PLAN   Plan: Cont POC- Continue emphasis/focus on exercise progression, improving proper muscle recruitment and activation/motor control patterns, increasing ROM, and allowing for proper ROM. Next visit plan to progress reps and add new exercises     Electronically Signed by Clau Bynum PT, MSPT, OMT-C [id number]                   Date: 01/29/2024     Note: If patient does not return for scheduled/recommended follow up visits, this note will serve as a discharge from care along with the most recent update on progress.

## 2024-02-02 ENCOUNTER — HOSPITAL ENCOUNTER (OUTPATIENT)
Dept: PHYSICAL THERAPY | Age: 69
Setting detail: THERAPIES SERIES
Discharge: HOME | End: 2024-02-02
Payer: MEDICARE

## 2024-02-02 PROCEDURE — 97140 MANUAL THERAPY 1/> REGIONS: CPT | Performed by: PHYSICAL THERAPIST

## 2024-02-02 PROCEDURE — 97110 THERAPEUTIC EXERCISES: CPT | Performed by: PHYSICAL THERAPIST

## 2024-02-02 NOTE — FLOWSHEET NOTE
[facility]- Outpatient Rehabilitation and Therapy  [address], [place] [address] office: [phone] fax: [phone]    MEDICARE CAP EXCEPTION DOCUMENTATION      I certify that this patient meets one of the below criteria necessary for becoming an exception to the Medicare cap on therapy services:    [x]  The patient has a condition identified by an ICD-10 code that has a direct and significant impact on the need for therapy. (Significantly impacts the rate of recovery.)                   []  The patient has a complexity identified by an ICD-10 code that has a direct and significant impact on the need for therapy.  (Significantly impacts the rate of recovery and is associated with a primary condition.)         []  The patient has associated variables that influence the amount of treatment to include:  Social support, self-efficacy/motivation, prognosis, time since onset/acuity.         []  The patient has generalized musculoskeletal conditions or a condition affecting multiple sites that will have a direct impact on the rate of recovery.    []  The patient had a prior episode of outpatient therapy during this calendar year for a different condition.           []  The patient has a mental or cognitive disorder in addition to the condition being treated that will have a direct and significant impact on the rate of recovery.           Physical Therapy: TREATMENT/PROGRESS NOTE   Patient: Marley Otero (68 y.o. female)   Treatment Date: 2024   :  1955 MRN: [id number]   Visit #:10 (16 In )  Insurance Allowable Auth Needed   BMN []Yes    [x]No    Insurance: Payor: MEDICARE / Plan: MEDICARE PART A AND B / Product Type: *No Product type* /   Insurance ID: [id number] - (Medicare)  Secondary Insurance (if applicable):    Treatment Diagnosis: Left shoulder pain M25.512      Medical Diagnosis:    S/P shoulder replacement, left [Z96.612]   Referring Physician: Bob

## 2024-02-05 ENCOUNTER — OFFICE VISIT (OUTPATIENT)
Dept: ORTHOPEDIC SURGERY | Age: 69
End: 2024-02-05
Payer: MEDICARE

## 2024-02-05 ENCOUNTER — HOSPITAL ENCOUNTER (OUTPATIENT)
Dept: PHYSICAL THERAPY | Age: 69
Setting detail: THERAPIES SERIES
Discharge: HOME | End: 2024-02-05
Payer: MEDICARE

## 2024-02-05 VITALS — WEIGHT: 184 LBS | BODY MASS INDEX: 28.88 KG/M2 | HEIGHT: 67 IN

## 2024-02-05 DIAGNOSIS — Z96.612 S/P SHOULDER REPLACEMENT, LEFT: Primary | ICD-10-CM

## 2024-02-05 PROCEDURE — G8399 PT W/DXA RESULTS DOCUMENT: HCPCS | Performed by: PHYSICIAN ASSISTANT

## 2024-02-05 PROCEDURE — 97110 THERAPEUTIC EXERCISES: CPT | Performed by: PHYSICAL THERAPIST

## 2024-02-05 PROCEDURE — G8427 DOCREV CUR MEDS BY ELIG CLIN: HCPCS | Performed by: PHYSICIAN ASSISTANT

## 2024-02-05 PROCEDURE — 1123F ACP DISCUSS/DSCN MKR DOCD: CPT | Performed by: PHYSICIAN ASSISTANT

## 2024-02-05 PROCEDURE — G8417 CALC BMI ABV UP PARAM F/U: HCPCS | Performed by: PHYSICIAN ASSISTANT

## 2024-02-05 PROCEDURE — G8484 FLU IMMUNIZE NO ADMIN: HCPCS | Performed by: PHYSICIAN ASSISTANT

## 2024-02-05 PROCEDURE — 1090F PRES/ABSN URINE INCON ASSESS: CPT | Performed by: PHYSICIAN ASSISTANT

## 2024-02-05 PROCEDURE — 3017F COLORECTAL CA SCREEN DOC REV: CPT | Performed by: PHYSICIAN ASSISTANT

## 2024-02-05 PROCEDURE — 97140 MANUAL THERAPY 1/> REGIONS: CPT | Performed by: PHYSICAL THERAPIST

## 2024-02-05 PROCEDURE — 99213 OFFICE O/P EST LOW 20 MIN: CPT | Performed by: PHYSICIAN ASSISTANT

## 2024-02-05 PROCEDURE — 1036F TOBACCO NON-USER: CPT | Performed by: PHYSICIAN ASSISTANT

## 2024-02-05 NOTE — FLOWSHEET NOTE
[facility]- Outpatient Rehabilitation and Therapy  [address], [place] [address] office: [phone] fax: [phone]    MEDICARE CAP EXCEPTION DOCUMENTATION      I certify that this patient meets one of the below criteria necessary for becoming an exception to the Medicare cap on therapy services:    [x]  The patient has a condition identified by an ICD-10 code that has a direct and significant impact on the need for therapy. (Significantly impacts the rate of recovery.)                   []  The patient has a complexity identified by an ICD-10 code that has a direct and significant impact on the need for therapy.  (Significantly impacts the rate of recovery and is associated with a primary condition.)         []  The patient has associated variables that influence the amount of treatment to include:  Social support, self-efficacy/motivation, prognosis, time since onset/acuity.         []  The patient has generalized musculoskeletal conditions or a condition affecting multiple sites that will have a direct impact on the rate of recovery.    []  The patient had a prior episode of outpatient therapy during this calendar year for a different condition.           []  The patient has a mental or cognitive disorder in addition to the condition being treated that will have a direct and significant impact on the rate of recovery.           Physical Therapy: TREATMENT/PROGRESS NOTE   Patient: Marley Otero (68 y.o. female)   Treatment Date: 2024   :  1955 MRN: [id number]   Visit #:10 (16 In )  Insurance Allowable Auth Needed   BMN []Yes    [x]No    Insurance: Payor: MEDICARE / Plan: MEDICARE PART A AND B / Product Type: *No Product type* /   Insurance ID: [id number] - (Medicare)  Secondary Insurance (if applicable):    Treatment Diagnosis: Left shoulder pain M25.512      Medical Diagnosis:    S/P shoulder replacement, left [Z96.612]   Referring Physician: Bob

## 2024-02-05 NOTE — PROGRESS NOTES
Dr Desmond Wayne      Date /Time 10/26/2023       10:36 AM EDT  Name Marley Otero             1955   [facility]  MRN [id number]                Chief Complaint   Patient presents with    Follow-up     1st PO Left TSA 10/09/2023        History of Present Illness      Marley Otero is a 68 y.o. female is here for post-op visit after LEFT  71711 Total Shoulder Replacement  24601 Biceps tenodesis      Patient presents the office today for a follow-up visit.  Patient is over 3 months status post left total shoulder arthroplasty.  She is doing well.  Her strength is returning.  She denies any fever or chills.  Pain controlled    Physical Exam    Based off 1997 Exam Criteria    Ht 1.702 m (5' 7\")   Wt 83.5 kg (184 lb)   BMI 28.82 kg/m²      Constitutional:       General: He is not in acute distress.     Appearance: Normal appearance.     LEFT Shoulder: incision clean, intact, healing appropriately. No surrounding  erythema or fluctuance. Neuro intact distal. No evidence of DVT.    Active forward flexion: 150  Active external rotation: 15        Imaging       Left Shoulder: [facility]  Previous Radiographs: X-rays were ordered today reviewed her left shoulder.  3 views.  AP, scapular Y, and axillary views.  They demonstrate a total shoulder arthroplasty in good position.  No evidence of loosening or periprosthetic fracture.  I have compared her x-rays to previous films.  There does appear to be superior migration of the humeral head        Assessment and Plan    Marley was seen today for follow-up.    Diagnoses and all orders for this visit:    S/P shoulder replacement, left  -     XR SHOULDER LEFT (MIN 2 VIEWS); Future      Patient doing well.  Continue physical therapy along the total shoulder arthroplasty protocol.  Follow-up in 3 months for x-rays and range of motion check or sooner if problems arise.  We have discussed predental and preinvasive procedure antibiotic    I

## 2024-02-09 ENCOUNTER — HOSPITAL ENCOUNTER (OUTPATIENT)
Dept: PHYSICAL THERAPY | Age: 69
Setting detail: THERAPIES SERIES
Discharge: HOME | End: 2024-02-09
Payer: MEDICARE

## 2024-02-09 PROCEDURE — 97110 THERAPEUTIC EXERCISES: CPT | Performed by: PHYSICAL THERAPIST

## 2024-02-09 PROCEDURE — 97140 MANUAL THERAPY 1/> REGIONS: CPT | Performed by: PHYSICAL THERAPIST

## 2024-02-09 NOTE — PLAN OF CARE
better to allow for proper joint functioning as indicated by patients functional deficits.  Status: [x] Progressing: [] Met: [] Not Met: [] Adjusted  Pt to improve strength to 4+/5 or better of rotator cuff, scapular retractors, shoulder elevators, biceps, and triceps to allow for proper muscle and joint use in functional mobility, ADLs and prior level of function   Status: [x] Progressing: [] Met: [] Not Met: [] Adjusted  Patient will return to Reaching activities, Bathing/Grooming, Lifting, and Crafts/hobbies without increased symptoms or restriction to work towards return to prior level of function.                         Status: [x] Progressing: [] Met: [] Not Met: [] Adjusted  Patient will increase UE function to allow independence in all self-care activities.                                                                                                           Status: [x] Progressing: [] Met: [] Not Met: [] Adjusted    Overall Progression Towards Functional goals/ Treatment Progress Update:  [] Patient is progressing as expected towards functional goals listed.    [x] Progression is slowed due to complexities/Impairments listed.  [] Progression has been slowed due to co-morbidities.  [] Plan just implemented, too soon (<30days) to assess goals progression   [] Goals require adjustment due to lack of progress  [] Patient is not progressing as expected and requires additional follow up with physician  [] Other:     CHARGE CAPTURE     PT CHARGE GRID   CPT Code (TIMED) minutes # CPT Code (UNTIMED) #     [x] Therex (06403)  30 2  [] EVAL:LOW (07506 - Typically 20 minutes face-to-face)     [] Neuromusc. Re-ed (95092)    [] Re-Eval (24118)     [x] Manual (35177) 10 1  [] Estim Unattended (37299)     [] Ther. Act (37682)    [] Mech. Traction (37589)     [] Gait (32062)    [] Dry Needle 1-2 muscle (20560)     [] Aquatic Therex (95352)    [] Dry Needle 3+ muscle (20561)     [] Iontophoresis (25483)    [] VASO

## 2024-02-12 ENCOUNTER — HOSPITAL ENCOUNTER (OUTPATIENT)
Dept: PHYSICAL THERAPY | Age: 69
Setting detail: THERAPIES SERIES
Discharge: HOME | End: 2024-02-12
Payer: MEDICARE

## 2024-02-12 PROCEDURE — 97140 MANUAL THERAPY 1/> REGIONS: CPT | Performed by: PHYSICAL THERAPIST

## 2024-02-12 PROCEDURE — 97110 THERAPEUTIC EXERCISES: CPT | Performed by: PHYSICAL THERAPIST

## 2024-02-12 NOTE — PLAN OF CARE
[facility]- Outpatient Rehabilitation and Therapy  [address], [place] [address] office: [phone] fax: [phone]  MEDICARE CAP EXCEPTION DOCUMENTATION      I certify that this patient meets one of the below criteria necessary for becoming an exception to the Medicare cap on therapy services:    [x]  The patient has a condition identified by an ICD-10 code that has a direct and significant impact on the need for therapy. (Significantly impacts the rate of recovery.)                   []  The patient has a complexity identified by an ICD-10 code that has a direct and significant impact on the need for therapy.  (Significantly impacts the rate of recovery and is associated with a primary condition.)         []  The patient has associated variables that influence the amount of treatment to include:  Social support, self-efficacy/motivation, prognosis, time since onset/acuity.         []  The patient has generalized musculoskeletal conditions or a condition affecting multiple sites that will have a direct impact on the rate of recovery.    []  The patient had a prior episode of outpatient therapy during this calendar year for a different condition.           []  The patient has a mental or cognitive disorder in addition to the condition being treated that will have a direct and significant impact on the rate of recovery.           Physical Therapy: TREATMENT/PROGRESS NOTE   Patient: Marley Otero (68 y.o. female)   Treatment Date: 2024   :  1955 MRN: [id number]   Visit #:12 (16 In )  Insurance Allowable Auth Needed   BMN []Yes    [x]No    Insurance: Payor: MEDICARE / Plan: MEDICARE PART A AND B / Product Type: *No Product type* /   Insurance ID: [id number] - (Medicare)  Secondary Insurance (if applicable):    Treatment Diagnosis: Left shoulder pain M25.512      Medical Diagnosis:    S/P shoulder replacement, left [Z96.612]   Referring Physician: Juan Rojas,

## 2024-02-16 ENCOUNTER — HOSPITAL ENCOUNTER (OUTPATIENT)
Dept: PHYSICAL THERAPY | Age: 69
Setting detail: THERAPIES SERIES
Discharge: HOME | End: 2024-02-16
Payer: MEDICARE

## 2024-02-16 PROCEDURE — 97140 MANUAL THERAPY 1/> REGIONS: CPT | Performed by: PHYSICAL THERAPIST

## 2024-02-16 PROCEDURE — 97110 THERAPEUTIC EXERCISES: CPT | Performed by: PHYSICAL THERAPIST

## 2024-02-16 NOTE — PLAN OF CARE
shoulder AROM to 60 degrees or  better to allow for proper joint functioning as indicated by patients functional deficits.  Status: [x] Progressing: [] Met: [] Not Met: [] Adjusted  Pt to improve strength to 4+/5 or better of rotator cuff, scapular retractors, shoulder elevators, biceps, and triceps to allow for proper muscle and joint use in functional mobility, ADLs and prior level of function   Status: [x] Progressing: [] Met: [] Not Met: [] Adjusted  Patient will return to Reaching activities, Bathing/Grooming, Lifting, and Crafts/hobbies without increased symptoms or restriction to work towards return to prior level of function.                         Status: [x] Progressing: [] Met: [] Not Met: [] Adjusted  Patient will increase UE function to allow independence in all self-care activities.                                                                                                           Status: [x] Progressing: [] Met: [] Not Met: [] Adjusted    Overall Progression Towards Functional goals/ Treatment Progress Update:  [] Patient is progressing as expected towards functional goals listed.    [x] Progression is slowed due to complexities/Impairments listed.  [] Progression has been slowed due to co-morbidities.  [] Plan just implemented, too soon (<30days) to assess goals progression   [] Goals require adjustment due to lack of progress  [] Patient is not progressing as expected and requires additional follow up with physician  [] Other:     CHARGE CAPTURE     PT CHARGE GRID   CPT Code (TIMED) minutes # CPT Code (UNTIMED) #     [x] Therex (97325)  28 2  [] EVAL:LOW (07963 - Typically 20 minutes face-to-face)     [] Neuromusc. Re-ed (73784)    [] Re-Eval (52029)     [x] Manual (47991) 17 1  [] Estim Unattended (14751)     [] Ther. Act (89494)    [] Mech. Traction (06906)     [] Gait (80179)    [] Dry Needle 1-2 muscle (20560)     [] Aquatic Therex (49691)    [] Dry Needle 3+ muscle (20561)     []

## 2024-02-20 ENCOUNTER — HOSPITAL ENCOUNTER (OUTPATIENT)
Dept: PHYSICAL THERAPY | Age: 69
Setting detail: THERAPIES SERIES
Discharge: HOME | End: 2024-02-20
Payer: MEDICARE

## 2024-02-20 PROCEDURE — 97110 THERAPEUTIC EXERCISES: CPT | Performed by: PHYSICAL THERAPIST

## 2024-02-20 PROCEDURE — 97140 MANUAL THERAPY 1/> REGIONS: CPT | Performed by: PHYSICAL THERAPIST

## 2024-02-20 NOTE — PLAN OF CARE
[facility]- Outpatient Rehabilitation and Therapy  [address], [place] [address] office: [phone] fax: [phone]  MEDICARE CAP EXCEPTION DOCUMENTATION      I certify that this patient meets one of the below criteria necessary for becoming an exception to the Medicare cap on therapy services:    [x]  The patient has a condition identified by an ICD-10 code that has a direct and significant impact on the need for therapy. (Significantly impacts the rate of recovery.)                   []  The patient has a complexity identified by an ICD-10 code that has a direct and significant impact on the need for therapy.  (Significantly impacts the rate of recovery and is associated with a primary condition.)         []  The patient has associated variables that influence the amount of treatment to include:  Social support, self-efficacy/motivation, prognosis, time since onset/acuity.         []  The patient has generalized musculoskeletal conditions or a condition affecting multiple sites that will have a direct impact on the rate of recovery.    []  The patient had a prior episode of outpatient therapy during this calendar year for a different condition.           []  The patient has a mental or cognitive disorder in addition to the condition being treated that will have a direct and significant impact on the rate of recovery.           Physical Therapy: TREATMENT/PROGRESS NOTE   Patient: Marley Otero (68 y.o. female)   Treatment Date: 2024   :  1955 MRN: [id number]   Visit #:14 (16 In )  Insurance Allowable Auth Needed   BMN []Yes    [x]No    Insurance: Payor: MEDICARE / Plan: MEDICARE PART A AND B / Product Type: *No Product type* /   Insurance ID: [id number] - (Medicare)  Secondary Insurance (if applicable):    Treatment Diagnosis: Left shoulder pain M25.512      Medical Diagnosis:    S/P shoulder replacement, left [Z96.612]   Referring Physician: Juan Rojas,

## 2024-02-23 ENCOUNTER — HOSPITAL ENCOUNTER (OUTPATIENT)
Dept: PHYSICAL THERAPY | Age: 69
Setting detail: THERAPIES SERIES
Discharge: HOME | End: 2024-02-23
Payer: MEDICARE

## 2024-02-23 PROCEDURE — 97140 MANUAL THERAPY 1/> REGIONS: CPT | Performed by: PHYSICAL THERAPIST

## 2024-02-23 PROCEDURE — 97110 THERAPEUTIC EXERCISES: CPT | Performed by: PHYSICAL THERAPIST

## 2024-02-23 NOTE — FLOWSHEET NOTE
(92459)     [] Ultrasound (96173)    [] Group Therapy (77071)     [] Estim Attended (32216)    [] Other:    Total Timed Code Tx Minutes 45 3       Total Treatment Minutes 55        Charge Justification:  (19099) THERAPEUTIC EXERCISE - Provided verbal/tactile cueing for activities related to strengthening, flexibility, endurance, ROM performed to prevent loss of range of motion, maintain or improve muscular strength or increase flexibility, following either an injury or surgery.   (87876) MANUAL THERAPY -  Manual therapy techniques, 1 or more regions, each 15 minutes (Mobilization/manipulation, manual lymphatic drainage, manual traction) for the purpose of modulating pain, promoting relaxation,  increasing ROM, reducing/eliminating soft tissue swelling/inflammation/restriction, improving soft tissue extensibility and allowing for proper ROM for normal function with self care, mobility, lifting and ambulation    TREATMENT PLAN   Plan: Cont POC- Continue emphasis/focus on exercise progression, improving proper muscle recruitment and activation/motor control patterns, increasing ROM, and allowing for proper ROM. Next visit plan to progress reps and add new exercises progressing as jalen.    Electronically Signed by Clau Bynum PT, MSPT, OMT-C [id number]        Date: 02/23/2024     Note: If patient does not return for scheduled/recommended follow up visits, this note will serve as a discharge from care along with the most recent update on progress.

## 2024-02-26 ENCOUNTER — HOSPITAL ENCOUNTER (OUTPATIENT)
Dept: PHYSICAL THERAPY | Age: 69
Setting detail: THERAPIES SERIES
Discharge: HOME | End: 2024-02-26
Payer: MEDICARE

## 2024-02-26 PROCEDURE — 97140 MANUAL THERAPY 1/> REGIONS: CPT | Performed by: PHYSICAL THERAPIST

## 2024-02-26 PROCEDURE — 97110 THERAPEUTIC EXERCISES: CPT | Performed by: PHYSICAL THERAPIST

## 2024-02-26 NOTE — FLOWSHEET NOTE
[facility]- Outpatient Rehabilitation and Therapy  [address], [place] [address] office: [phone] fax: [phone]  MEDICARE CAP EXCEPTION DOCUMENTATION      I certify that this patient meets one of the below criteria necessary for becoming an exception to the Medicare cap on therapy services:    [x]  The patient has a condition identified by an ICD-10 code that has a direct and significant impact on the need for therapy. (Significantly impacts the rate of recovery.)                   []  The patient has a complexity identified by an ICD-10 code that has a direct and significant impact on the need for therapy.  (Significantly impacts the rate of recovery and is associated with a primary condition.)         []  The patient has associated variables that influence the amount of treatment to include:  Social support, self-efficacy/motivation, prognosis, time since onset/acuity.         []  The patient has generalized musculoskeletal conditions or a condition affecting multiple sites that will have a direct impact on the rate of recovery.    []  The patient had a prior episode of outpatient therapy during this calendar year for a different condition.           []  The patient has a mental or cognitive disorder in addition to the condition being treated that will have a direct and significant impact on the rate of recovery.           Physical Therapy: TREATMENT/PROGRESS NOTE   Patient: Marley Otero (68 y.o. female)   Treatment Date: 2024   :  1955 MRN: [id number]   Visit #:16 (16 In )  Insurance Allowable Auth Needed   BMN []Yes    [x]No    Insurance: Payor: MEDICARE / Plan: MEDICARE PART A AND B / Product Type: *No Product type* /   Insurance ID: [id number] - (Medicare)  Secondary Insurance (if applicable):    Treatment Diagnosis: Left shoulder pain M25.512      Medical Diagnosis:    S/P shoulder replacement, left [Z96.612]   Referring Physician: Juan Rojas,

## 2024-03-01 ENCOUNTER — HOSPITAL ENCOUNTER (OUTPATIENT)
Dept: PHYSICAL THERAPY | Age: 69
Setting detail: THERAPIES SERIES
Discharge: HOME | End: 2024-03-01
Payer: MEDICARE

## 2024-03-01 PROCEDURE — 97110 THERAPEUTIC EXERCISES: CPT | Performed by: PHYSICAL THERAPIST

## 2024-03-01 PROCEDURE — 97140 MANUAL THERAPY 1/> REGIONS: CPT | Performed by: PHYSICAL THERAPIST

## 2024-03-01 NOTE — FLOWSHEET NOTE
therapy during this calendar year for a different condition.  Current diagnosis requires skilled therapeutic intervention.    Treatment/Activity Tolerance:  [x] Patient tolerated treatment well [] Patient limited by fatique  [] Patient limited by pain  [] Patient limited by other medical complications  [] Other:     Return to Play: NA    Prognosis for POC: [x] Good [] Fair  [] Poor    Patient requires continued skilled intervention: [x] Yes  [] No      GOALS     Patient stated goal: Total range of motion  Status: [x] Progressing: [] Met: [] Not Met: [] Adjusted     Therapist goals for Patient:   Short Term Goals: To be achieved in: 2 weeks  Independent in HEP and progression per patient tolerance, in order to progress toward full function and prevent re-injury.               Status: [] Progressing: [x] Met: [] Not Met: [] Adjusted  Patient will have a decrease in pain to 2/10 to help  facilitate improvement in movement, function, and ADLs as indicated by functional deficits.              Status: [] Progressing: [x] Met: [] Not Met: [] Adjusted     Long Term Goals: To be achieved in: 12 weeks  Disability index score of 30% or less for the Quick DASH to assist with return to prior level of function.                 Status: [x] Progressing: [] Met: [] Not Met: [] Adjusted  Improve shoulder AROM to 60 degrees or  better to allow for proper joint functioning as indicated by patients functional deficits.  Status: [x] Progressing: [] Met: [] Not Met: [] Adjusted  Pt to improve strength to 4+/5 or better of rotator cuff, scapular retractors, shoulder elevators, biceps, and triceps to allow for proper muscle and joint use in functional mobility, ADLs and prior level of function   Status: [x] Progressing: [] Met: [] Not Met: [] Adjusted  Patient will return to Reaching activities, Bathing/Grooming, Lifting, and Crafts/hobbies without increased symptoms or restriction to work towards return to prior level of function.

## 2024-03-04 ENCOUNTER — HOSPITAL ENCOUNTER (OUTPATIENT)
Dept: PHYSICAL THERAPY | Age: 69
Setting detail: THERAPIES SERIES
Discharge: HOME | End: 2024-03-04
Payer: MEDICARE

## 2024-03-04 PROCEDURE — 97140 MANUAL THERAPY 1/> REGIONS: CPT | Performed by: PHYSICAL THERAPIST

## 2024-03-04 PROCEDURE — 97110 THERAPEUTIC EXERCISES: CPT | Performed by: PHYSICAL THERAPIST

## 2024-03-04 NOTE — FLOWSHEET NOTE
Patient limited by fatique  [] Patient limited by pain  [] Patient limited by other medical complications  [] Other:     Return to Play: NA    Prognosis for POC: [x] Good [] Fair  [] Poor    Patient requires continued skilled intervention: [x] Yes  [] No      GOALS     Patient stated goal: Total range of motion  Status: [x] Progressing: [] Met: [] Not Met: [] Adjusted     Therapist goals for Patient:   Short Term Goals: To be achieved in: 2 weeks  Independent in HEP and progression per patient tolerance, in order to progress toward full function and prevent re-injury.               Status: [] Progressing: [x] Met: [] Not Met: [] Adjusted  Patient will have a decrease in pain to 2/10 to help  facilitate improvement in movement, function, and ADLs as indicated by functional deficits.              Status: [] Progressing: [x] Met: [] Not Met: [] Adjusted     Long Term Goals: To be achieved in: 12 weeks  Disability index score of 30% or less for the Quick DASH to assist with return to prior level of function.                 Status: [x] Progressing: [] Met: [] Not Met: [] Adjusted  Improve shoulder AROM to 60 degrees or  better to allow for proper joint functioning as indicated by patients functional deficits.  Status: [x] Progressing: [] Met: [] Not Met: [] Adjusted  Pt to improve strength to 4+/5 or better of rotator cuff, scapular retractors, shoulder elevators, biceps, and triceps to allow for proper muscle and joint use in functional mobility, ADLs and prior level of function   Status: [x] Progressing: [] Met: [] Not Met: [] Adjusted  Patient will return to Reaching activities, Bathing/Grooming, Lifting, and Crafts/hobbies without increased symptoms or restriction to work towards return to prior level of function.                         Status: [x] Progressing: [] Met: [] Not Met: [] Adjusted  Patient will increase UE function to allow independence in all self-care activities.

## 2024-03-08 ENCOUNTER — HOSPITAL ENCOUNTER (OUTPATIENT)
Dept: PHYSICAL THERAPY | Age: 69
Setting detail: THERAPIES SERIES
Discharge: HOME | End: 2024-03-08
Payer: MEDICARE

## 2024-03-08 PROCEDURE — 97110 THERAPEUTIC EXERCISES: CPT | Performed by: PHYSICAL THERAPIST

## 2024-03-08 PROCEDURE — 97140 MANUAL THERAPY 1/> REGIONS: CPT | Performed by: PHYSICAL THERAPIST

## 2024-03-08 NOTE — FLOWSHEET NOTE
[facility]- Outpatient Rehabilitation and Therapy  [address], [place] [address] office: [phone] fax: [phone]  MEDICARE CAP EXCEPTION DOCUMENTATION      I certify that this patient meets one of the below criteria necessary for becoming an exception to the Medicare cap on therapy services:    [x]  The patient has a condition identified by an ICD-10 code that has a direct and significant impact on the need for therapy. (Significantly impacts the rate of recovery.)                   []  The patient has a complexity identified by an ICD-10 code that has a direct and significant impact on the need for therapy.  (Significantly impacts the rate of recovery and is associated with a primary condition.)         []  The patient has associated variables that influence the amount of treatment to include:  Social support, self-efficacy/motivation, prognosis, time since onset/acuity.         []  The patient has generalized musculoskeletal conditions or a condition affecting multiple sites that will have a direct impact on the rate of recovery.    []  The patient had a prior episode of outpatient therapy during this calendar year for a different condition.           []  The patient has a mental or cognitive disorder in addition to the condition being treated that will have a direct and significant impact on the rate of recovery.           Physical Therapy: TREATMENT/PROGRESS NOTE   Patient: Marley Otero (68 y.o. female)   Treatment Date: 2024   :  1955 MRN: [id number]   Visit #:19 (16 In )  Insurance Allowable Auth Needed   BMN []Yes    [x]No    Insurance: Payor: MEDICARE / Plan: MEDICARE PART A AND B / Product Type: *No Product type* /   Insurance ID: [id number] - (Medicare)  Secondary Insurance (if applicable):    Treatment Diagnosis: Left shoulder pain M25.512      Medical Diagnosis:    S/P shoulder replacement, left [Z96.612]   Referring Physician: Juan Rojas,

## 2024-03-11 ENCOUNTER — HOSPITAL ENCOUNTER (OUTPATIENT)
Dept: PHYSICAL THERAPY | Age: 69
Setting detail: THERAPIES SERIES
Discharge: HOME | End: 2024-03-11
Payer: MEDICARE

## 2024-03-11 PROCEDURE — 97140 MANUAL THERAPY 1/> REGIONS: CPT | Performed by: PHYSICAL THERAPIST

## 2024-03-11 PROCEDURE — 97110 THERAPEUTIC EXERCISES: CPT | Performed by: PHYSICAL THERAPIST

## 2024-03-11 NOTE — PLAN OF CARE
muscular strength or increase flexibility, following either an injury or surgery.   (09455) MANUAL THERAPY -  Manual therapy techniques, 1 or more regions, each 15 minutes (Mobilization/manipulation, manual lymphatic drainage, manual traction) for the purpose of modulating pain, promoting relaxation,  increasing ROM, reducing/eliminating soft tissue swelling/inflammation/restriction, improving soft tissue extensibility and allowing for proper ROM for normal function with self care, mobility, lifting and ambulation    TREATMENT PLAN   Plan: Cont POC- Continue emphasis/focus on exercise progression, improving proper muscle recruitment and activation/motor control patterns, increasing ROM, and allowing for proper ROM. Next visit plan to progress reps and add new exercises progressing as jalen.    Electronically Signed by Clau Bynum PT, MSPT, OMT-C [id number]          Date: 03/11/2024     Note: If patient does not return for scheduled/recommended follow up visits, this note will serve as a discharge from care along with the most recent update on progress.

## 2024-03-15 ENCOUNTER — HOSPITAL ENCOUNTER (OUTPATIENT)
Dept: PHYSICAL THERAPY | Age: 69
Setting detail: THERAPIES SERIES
Discharge: HOME | End: 2024-03-15
Payer: MEDICARE

## 2024-03-15 PROCEDURE — 97110 THERAPEUTIC EXERCISES: CPT | Performed by: PHYSICAL THERAPIST

## 2024-03-15 PROCEDURE — 97140 MANUAL THERAPY 1/> REGIONS: CPT | Performed by: PHYSICAL THERAPIST

## 2024-03-15 NOTE — FLOWSHEET NOTE
[facility]- Outpatient Rehabilitation and Therapy  [address], [place] [address] office: [phone] fax: [phone]  MEDICARE CAP EXCEPTION DOCUMENTATION      I certify that this patient meets one of the below criteria necessary for becoming an exception to the Medicare cap on therapy services:    [x]  The patient has a condition identified by an ICD-10 code that has a direct and significant impact on the need for therapy. (Significantly impacts the rate of recovery.)                   []  The patient has a complexity identified by an ICD-10 code that has a direct and significant impact on the need for therapy.  (Significantly impacts the rate of recovery and is associated with a primary condition.)         []  The patient has associated variables that influence the amount of treatment to include:  Social support, self-efficacy/motivation, prognosis, time since onset/acuity.         []  The patient has generalized musculoskeletal conditions or a condition affecting multiple sites that will have a direct impact on the rate of recovery.    []  The patient had a prior episode of outpatient therapy during this calendar year for a different condition.           []  The patient has a mental or cognitive disorder in addition to the condition being treated that will have a direct and significant impact on the rate of recovery.               Physical Therapy: TREATMENT/PROGRESS NOTE   Patient: Marley Otero (68 y.o. female)   Treatment Date: 03/15/2024   :  1955 MRN: [id number]   Visit #:21 (16 In )  Insurance Allowable Auth Needed   BMN []Yes    [x]No    Insurance: Payor: MEDICARE / Plan: MEDICARE PART A AND B / Product Type: *No Product type* /   Insurance ID: [id number] - (Medicare)  Secondary Insurance (if applicable):    Treatment Diagnosis: Left shoulder pain M25.512      Medical Diagnosis:    S/P shoulder replacement, left [Z96.612]   Referring Physician: Bob

## 2024-03-18 ENCOUNTER — HOSPITAL ENCOUNTER (OUTPATIENT)
Dept: PHYSICAL THERAPY | Age: 69
Setting detail: THERAPIES SERIES
Discharge: HOME | End: 2024-03-18
Payer: MEDICARE

## 2024-03-18 PROCEDURE — 97140 MANUAL THERAPY 1/> REGIONS: CPT | Performed by: PHYSICAL THERAPIST

## 2024-03-18 PROCEDURE — 97110 THERAPEUTIC EXERCISES: CPT | Performed by: PHYSICAL THERAPIST

## 2024-03-18 NOTE — FLOWSHEET NOTE
deg 3#    Supine flexion elevated with table 40 2# 3 10 SL ER 2# 3 10    SL abd to 90 with ER 2# 3 10 Good form this date   Supine RS at 90u/d,s/s,cw elevated 40 2#    SL horizontal abd  2# 2 10           Bent over row 10# 3 10 Inc weight   Prone shoulder ex    Supine hoz abd with band    Standing bicep curl    TB row RTB   TB Ext Max cues for form   Wall crawl flexion, scaption, D1 flexion  1 10 ea        Wall scap clocks with band OVL 1 15 11, 9, 7 o'clock   CC Single Rows 10# dnd2 10    CC triceps 5#    CC Single  shld ext 5# dnd2 10    CC biceps 5# dnd2 10     CC lawnmower pull    CC LPD    Ball on table u/d,s/s,cw/ccw  2 10 ea    B UE lift med ball to top of wall 4# MB 3 10 Standing further away for second set   PNF lift L to R    Standing B shoulder flexion to 90 with band around wrist for h. abd Yellow 2 10 Cues to avoid bending the elbow or hiking the shoulder   Supine butterfly stretch  HEP            Manual Intervention (77435)  TIME            GHJ mobs inf and post glide,    6'  Cervical distraction   PROM flexion/scaption, abduction and ER/IR  6'     STW L pec and bicep  3'     Scar mobilization      IASTM bicep      UT stretch L,        NMR re-education (41160) resistance Sets/time Reps CUES NEEDED          Wall push up   1 20    Standing shoulder flexion to 90  1 10 No hike                   Modalities:    Cold Pack- left shoulder x 10 min  Hot Pack- neck x 10 min    Education/Home Exercise Program: Patient HEP program created electronically.  Refer to [employer] access code:    Access Code: [email or web address]  URL: [email or web address]  Date: 02/26/2024  Prepared by: Clau Bynum    Exercises  - Standing Single Arm Shoulder Flexion Towel Slide at Table Top  - 1-2 x daily - 7 x weekly - 1 sets - 10 reps  - Seated Cervical Sidebending Stretch  - 2 x daily - 7 x weekly - 1 sets - 5 reps - 30 hold  - Seated Scapular Retraction  - 3 x daily - 7 x weekly - 2 sets - 10 reps - 1-2 hold  - Supine Shoulder

## 2024-03-22 ENCOUNTER — HOSPITAL ENCOUNTER (OUTPATIENT)
Dept: PHYSICAL THERAPY | Age: 69
Setting detail: THERAPIES SERIES
Discharge: HOME | End: 2024-03-22
Payer: MEDICARE

## 2024-03-22 PROCEDURE — 97110 THERAPEUTIC EXERCISES: CPT | Performed by: PHYSICAL THERAPIST

## 2024-03-22 PROCEDURE — 97140 MANUAL THERAPY 1/> REGIONS: CPT | Performed by: PHYSICAL THERAPIST

## 2024-03-22 NOTE — FLOWSHEET NOTE
[facility]- Outpatient Rehabilitation and Therapy  [address], [place] [address] office: [phone] fax: [phone]  MEDICARE CAP EXCEPTION DOCUMENTATION      I certify that this patient meets one of the below criteria necessary for becoming an exception to the Medicare cap on therapy services:    [x]  The patient has a condition identified by an ICD-10 code that has a direct and significant impact on the need for therapy. (Significantly impacts the rate of recovery.)                   []  The patient has a complexity identified by an ICD-10 code that has a direct and significant impact on the need for therapy.  (Significantly impacts the rate of recovery and is associated with a primary condition.)         []  The patient has associated variables that influence the amount of treatment to include:  Social support, self-efficacy/motivation, prognosis, time since onset/acuity.         []  The patient has generalized musculoskeletal conditions or a condition affecting multiple sites that will have a direct impact on the rate of recovery.    []  The patient had a prior episode of outpatient therapy during this calendar year for a different condition.           []  The patient has a mental or cognitive disorder in addition to the condition being treated that will have a direct and significant impact on the rate of recovery.               Physical Therapy: TREATMENT/PROGRESS NOTE   Patient: Marley Otero (68 y.o. female)   Treatment Date: 2024   :  1955 MRN: [id number]   Visit #:22 (16 In )  Insurance Allowable Auth Needed   BMN []Yes    [x]No    Insurance: Payor: MEDICARE / Plan: MEDICARE PART A AND B / Product Type: *No Product type* /   Insurance ID: [id number] - (Medicare)  Secondary Insurance (if applicable):    Treatment Diagnosis: Left shoulder pain M25.512      Medical Diagnosis:    S/P shoulder replacement, left [Z96.612]   Referring Physician: Bob

## 2024-03-25 ENCOUNTER — HOSPITAL ENCOUNTER (OUTPATIENT)
Dept: PHYSICAL THERAPY | Age: 69
Setting detail: THERAPIES SERIES
Discharge: HOME | End: 2024-03-25
Payer: MEDICARE

## 2024-03-25 PROCEDURE — 97140 MANUAL THERAPY 1/> REGIONS: CPT | Performed by: PHYSICAL THERAPIST

## 2024-03-25 PROCEDURE — 97110 THERAPEUTIC EXERCISES: CPT | Performed by: PHYSICAL THERAPIST

## 2024-03-25 NOTE — FLOWSHEET NOTE
[facility]- Outpatient Rehabilitation and Therapy  [address], [place] [address] office: [phone] fax: [phone]  MEDICARE CAP EXCEPTION DOCUMENTATION      I certify that this patient meets one of the below criteria necessary for becoming an exception to the Medicare cap on therapy services:    [x]  The patient has a condition identified by an ICD-10 code that has a direct and significant impact on the need for therapy. (Significantly impacts the rate of recovery.)                   []  The patient has a complexity identified by an ICD-10 code that has a direct and significant impact on the need for therapy.  (Significantly impacts the rate of recovery and is associated with a primary condition.)         []  The patient has associated variables that influence the amount of treatment to include:  Social support, self-efficacy/motivation, prognosis, time since onset/acuity.         []  The patient has generalized musculoskeletal conditions or a condition affecting multiple sites that will have a direct impact on the rate of recovery.    []  The patient had a prior episode of outpatient therapy during this calendar year for a different condition.           []  The patient has a mental or cognitive disorder in addition to the condition being treated that will have a direct and significant impact on the rate of recovery.               Physical Therapy: TREATMENT/PROGRESS NOTE   Patient: Marley Otero (68 y.o. female)   Treatment Date: 2024   :  1955 MRN: [id number]   Visit #:23 (16 In )  Insurance Allowable Auth Needed   BMN []Yes    [x]No    Insurance: Payor: MEDICARE / Plan: MEDICARE PART A AND B / Product Type: *No Product type* /   Insurance ID: [id number] - (Medicare)  Secondary Insurance (if applicable):    Treatment Diagnosis: Left shoulder pain M25.512      Medical Diagnosis:    S/P shoulder replacement, left [Z96.612]   Referring Physician: Bob

## 2024-03-28 ENCOUNTER — HOSPITAL ENCOUNTER (OUTPATIENT)
Dept: PHYSICAL THERAPY | Age: 69
Setting detail: THERAPIES SERIES
Discharge: HOME | End: 2024-03-28
Payer: MEDICARE

## 2024-03-28 PROCEDURE — 97140 MANUAL THERAPY 1/> REGIONS: CPT | Performed by: PHYSICAL THERAPIST

## 2024-03-28 PROCEDURE — 97110 THERAPEUTIC EXERCISES: CPT | Performed by: PHYSICAL THERAPIST

## 2024-03-28 NOTE — FLOWSHEET NOTE
[facility]- Outpatient Rehabilitation and Therapy  [address], [place] [address] office: [phone] fax: [phone]  MEDICARE CAP EXCEPTION DOCUMENTATION      I certify that this patient meets one of the below criteria necessary for becoming an exception to the Medicare cap on therapy services:    [x]  The patient has a condition identified by an ICD-10 code that has a direct and significant impact on the need for therapy. (Significantly impacts the rate of recovery.)                   []  The patient has a complexity identified by an ICD-10 code that has a direct and significant impact on the need for therapy.  (Significantly impacts the rate of recovery and is associated with a primary condition.)         []  The patient has associated variables that influence the amount of treatment to include:  Social support, self-efficacy/motivation, prognosis, time since onset/acuity.         []  The patient has generalized musculoskeletal conditions or a condition affecting multiple sites that will have a direct impact on the rate of recovery.    []  The patient had a prior episode of outpatient therapy during this calendar year for a different condition.           []  The patient has a mental or cognitive disorder in addition to the condition being treated that will have a direct and significant impact on the rate of recovery.               Physical Therapy: TREATMENT/PROGRESS NOTE   Patient: Marley Otero (68 y.o. female)   Treatment Date: 2024   :  1955 MRN: [id number]   Visit #:24 (16 In )  Insurance Allowable Auth Needed   BMN []Yes    [x]No    Insurance: Payor: MEDICARE / Plan: MEDICARE PART A AND B / Product Type: *No Product type* /   Insurance ID: [id number] - (Medicare)  Secondary Insurance (if applicable):    Treatment Diagnosis: Left shoulder pain M25.512      Medical Diagnosis:    S/P shoulder replacement, left [Z96.612]   Referring Physician: Bob

## 2024-03-29 ENCOUNTER — APPOINTMENT (OUTPATIENT)
Dept: PHYSICAL THERAPY | Age: 69
End: 2024-03-29
Payer: MEDICARE

## 2024-04-01 ENCOUNTER — HOSPITAL ENCOUNTER (OUTPATIENT)
Dept: PHYSICAL THERAPY | Age: 69
Setting detail: THERAPIES SERIES
Discharge: HOME | End: 2024-04-01
Payer: MEDICARE

## 2024-04-01 PROCEDURE — 97140 MANUAL THERAPY 1/> REGIONS: CPT | Performed by: PHYSICAL THERAPIST

## 2024-04-01 PROCEDURE — 97112 NEUROMUSCULAR REEDUCATION: CPT | Performed by: PHYSICAL THERAPIST

## 2024-04-01 PROCEDURE — 97110 THERAPEUTIC EXERCISES: CPT | Performed by: PHYSICAL THERAPIST

## 2024-04-01 NOTE — FLOWSHEET NOTE
Shoulder Alphabet  - 2 x daily - 7 x weekly - 3 sets - 10 reps  - Supine Shoulder Circles with Weight  - 2 x daily - 7 x weekly - 3 sets - 10 reps  - Supine Chest Press with Dumbbells  - 2 x daily - 7 x weekly - 3 sets - 10 reps  - Supine Shoulder Flexion with Free Weight  - 2 x daily - 7 x weekly - 3 sets - 10 reps  - Supine Shoulder Horizontal Abduction with Dumbbells  - 2 x daily - 7 x weekly - 3 sets - 10 reps  - Sidelying Shoulder ER with Towel and Dumbbell  - 2 x daily - 7 x weekly - 3 sets - 10 reps  - Sidelying Shoulder Abduction Palm Forward  - 2 x daily - 7 x weekly - 3 sets - 10 reps  - Shoulder Flexion Wall Slide with Towel  - 1 x daily - 7 x weekly - 1 sets - 10 reps  - Standing shoulder flexion wall slides  - 1 x daily - 7 x weekly - 1 sets - 10 reps    ASSESSMENT     Today's Assessment: Patient had good tolerance to today's session, reporting improved ROM, reduced soreness, and muscular fatigue with program completed. Able to progress  exercise difficulty on adding prone over SB ex and supine ER at 90 abd. Continues to display deficits in stiffness and weakness which required ongoing skilled physical therapy and decision making.   Medical Necessity Documentation:  I certify that this patient meets the below criteria necessary for medical necessity for care and/or justification of therapy services:  The patient has a musculoskeletal condition(s) with a corresponding ICD-10 code that is of complexity and severity that require skilled therapeutic intervention. This has a direct and significant impact on the need for therapy and significantly impacts the rate of recovery.   The patient had a prior episode of outpatient therapy during this calendar year for a different condition.  Current diagnosis requires skilled therapeutic intervention.    Treatment/Activity Tolerance:  [x] Patient tolerated treatment well [] Patient limited by fatique  [] Patient limited by pain  [] Patient limited by other medical

## 2024-04-04 ENCOUNTER — HOSPITAL ENCOUNTER (OUTPATIENT)
Dept: PHYSICAL THERAPY | Age: 69
Setting detail: THERAPIES SERIES
Discharge: HOME | End: 2024-04-04
Payer: MEDICARE

## 2024-04-04 PROCEDURE — 97140 MANUAL THERAPY 1/> REGIONS: CPT | Performed by: PHYSICAL THERAPIST

## 2024-04-04 PROCEDURE — 97110 THERAPEUTIC EXERCISES: CPT | Performed by: PHYSICAL THERAPIST

## 2024-04-04 NOTE — FLOWSHEET NOTE
[] Met: [] Not Met: [] Adjusted  Patient will increase UE function to allow independence in all self-care activities.                                                                                                           Status: [x] Progressing: [] Met: [] Not Met: [] Adjusted    Overall Progression Towards Functional goals/ Treatment Progress Update:  [] Patient is progressing as expected towards functional goals listed.    [x] Progression is slowed due to complexities/Impairments listed.  [] Progression has been slowed due to co-morbidities.  [] Plan just implemented, too soon (<30days) to assess goals progression   [] Goals require adjustment due to lack of progress  [] Patient is not progressing as expected and requires additional follow up with physician  [] Other:     CHARGE CAPTURE     PT CHARGE GRID   CPT Code (TIMED) minutes # CPT Code (UNTIMED) #     [x] Therex (94911)  32 2  [] EVAL:LOW (81945 - Typically 20 minutes face-to-face)     [] Neuromusc. Re-ed (05967)    [] Re-Eval (37783)     [x] Manual (12756) 8 1  [] Estim Unattended (16842)     [] Ther. Act (00653)    [] Mech. Traction (41017)     [] Gait (23327)    [] Dry Needle 1-2 muscle (02819)     [] Aquatic Therex (08979)    [] Dry Needle 3+ muscle (20561)     [] Iontophoresis (22640)    [] VASO (08682)     [] Ultrasound (02001)    [] Group Therapy (91028)     [] Estim Attended (37774)    [] Other:     Total Timed Code Tx Minutes 40 3       Total Treatment Minutes 50        Charge Justification:  (24354) THERAPEUTIC EXERCISE - Provided verbal/tactile cueing for activities related to strengthening, flexibility, endurance, ROM performed to prevent loss of range of motion, maintain or improve muscular strength or increase flexibility, following either an injury or surgery.   (95688) NEUROMUSCULAR RE-EDUCATION - Therapeutic procedure, 1 or more areas, each 15 minutes; neuromuscular reeducation of movement, balance, coordination, kinesthetic sense,

## 2024-04-08 ENCOUNTER — HOSPITAL ENCOUNTER (OUTPATIENT)
Dept: PHYSICAL THERAPY | Age: 69
Setting detail: THERAPIES SERIES
Discharge: HOME | End: 2024-04-08
Payer: MEDICARE

## 2024-04-08 PROCEDURE — 97110 THERAPEUTIC EXERCISES: CPT | Performed by: PHYSICAL THERAPIST

## 2024-04-08 PROCEDURE — 97140 MANUAL THERAPY 1/> REGIONS: CPT | Performed by: PHYSICAL THERAPIST

## 2024-04-08 NOTE — FLOWSHEET NOTE
[facility]- Outpatient Rehabilitation and Therapy  [address], [place] [address] office: [phone] fax: [phone]  MEDICARE CAP EXCEPTION DOCUMENTATION      I certify that this patient meets one of the below criteria necessary for becoming an exception to the Medicare cap on therapy services:    [x]  The patient has a condition identified by an ICD-10 code that has a direct and significant impact on the need for therapy. (Significantly impacts the rate of recovery.)                   []  The patient has a complexity identified by an ICD-10 code that has a direct and significant impact on the need for therapy.  (Significantly impacts the rate of recovery and is associated with a primary condition.)         []  The patient has associated variables that influence the amount of treatment to include:  Social support, self-efficacy/motivation, prognosis, time since onset/acuity.         []  The patient has generalized musculoskeletal conditions or a condition affecting multiple sites that will have a direct impact on the rate of recovery.    []  The patient had a prior episode of outpatient therapy during this calendar year for a different condition.           []  The patient has a mental or cognitive disorder in addition to the condition being treated that will have a direct and significant impact on the rate of recovery.               Physical Therapy: TREATMENT/PROGRESS NOTE   Patient: Marley Otero (68 y.o. female)   Treatment Date: 2024   :  1955 MRN: [id number]   Visit #:27 (16 In )  Insurance Allowable Auth Needed   BMN []Yes    [x]No    Insurance: Payor: MEDICARE / Plan: MEDICARE PART A AND B / Product Type: *No Product type* /   Insurance ID: [id number] - (Medicare)  Secondary Insurance (if applicable):    Treatment Diagnosis: Left shoulder pain M25.512      Medical Diagnosis:    S/P shoulder replacement, left [Z96.612]   Referring Physician: Bob

## 2024-04-12 ENCOUNTER — HOSPITAL ENCOUNTER (OUTPATIENT)
Dept: PHYSICAL THERAPY | Age: 69
Setting detail: THERAPIES SERIES
Discharge: HOME | End: 2024-04-12
Payer: MEDICARE

## 2024-04-12 PROCEDURE — 97140 MANUAL THERAPY 1/> REGIONS: CPT | Performed by: PHYSICAL THERAPIST

## 2024-04-12 PROCEDURE — 97110 THERAPEUTIC EXERCISES: CPT | Performed by: PHYSICAL THERAPIST

## 2024-04-12 NOTE — PLAN OF CARE
[facility]- Outpatient Rehabilitation and Therapy  [address], [place] [address] office: [phone] fax: [phone]    Physical Therapy Re-Certification Plan of Care    Dear Juan Rojas PA  ,    We had the pleasure of treating the following patient for physical therapy services at [facility]. A summary of our findings can be found in the updated assessment below.  This includes our plan of care.  If you have any questions or concerns regarding these findings, please do not hesitate to contact me at the office phone number checked above.  Thank you for the referral.     Physician Signature:________________________________Date:__________________  By signing above (or electronic signature), therapist's plan is approved by physician      Functional Outcome:   Marley Otero 1955 continues to present with functional deficits in strength symmetry and endurance of strength  limiting ability with lifting items, pushing or pulling activity, and heavy home activity .  During therapy this date, patient required verbal cueing, progression of exercises and program, and manual interventions for exercise progression, improving proper muscle recruitment and activation/motor control patterns, improving soft tissue extensibility, and kinesthetic sense and proprioception. Patient will continue to benefit from ongoing evaluation and advanced clinical decision from a Physical Therapist to improve muscle strength, neuromuscular control, endurance, and ADL status to safely return to PLOF and ADLs/IADLs without symptoms or restrictions.    Overall Response to Treatment:   [x]Patient is responding well to treatment and improvement is noted with regards to goals   []Patient should continue to improve in reasonable time if they continue HEP   []Patient has plateaued and is no longer responding to skilled PT intervention    []Patient is getting worse and would benefit

## 2024-04-15 ENCOUNTER — HOSPITAL ENCOUNTER (OUTPATIENT)
Dept: PHYSICAL THERAPY | Age: 69
Setting detail: THERAPIES SERIES
Discharge: HOME | End: 2024-04-15
Payer: MEDICARE

## 2024-04-15 PROCEDURE — 97110 THERAPEUTIC EXERCISES: CPT | Performed by: PHYSICAL THERAPIST

## 2024-04-15 NOTE — FLOWSHEET NOTE
Clinic will contact you with backup strep test results when available.    Ear infection has cleared but it might take 2-3 more weeks for ears to return to normal.    Continue to monitor  Encourage nose blowing  Humidity might help with congestion  Encourage fluids   ESMER Martinez  PCP: Kim Manzanares MD                             Plan of care signed (Y/N):     Date of Patient follow up with Physician:      Progress Report/POC: NO   POC update due: (10 visits /OR AUTH LIMITS, whichever is less)  5/12/2023     Precautions/ Contra-indications:                                                                                          Latex allergy:  NO  Pacemaker:    NO  Contraindications for Manipulation: osteoporosis  and recent surgical history (relative)  Date of Surgery: 10/9/23 TSR  Other:      Red Flags:  None    Preferred Language for Healthcare:   [x]English       []other:    SUBJECTIVE EXAMINATION     Less soreness L shoulder, still stiff in AM, starting to have some R shoulder pain.       Test used Initial score  11/6/23 12/04/2023 12/22/23 1/8/24 2/9/24 3/11/24 4/8/24   Pain Summary VAS 6-8 2/10 2/10 \"achy\" 2/10 2/10 stiff 0-2/10 stiffnesss 0-2/10   Functional questionnaire Quick DASH 49=86% 45=77%    41/55 68%    Other:           Shoulder flexion   135 147 AAROM  AROM 125 AROM 130, 150 supine AROM shoulder flexion 140 Supine 170   Shoulder Scaption  115 142  125 AROM 165 supine 150 170   Shoulder ER  0-3 40  L spine of scap 60 at 90 supine, T1 T1 85 at 90   Shoulder IR     L rib 12 85 supine T 11 T10 80 at 90 supine   MMT           flexion     3+ 4 4 4   ER     3+ 4 4-4+ 4+   IR     3+ 4 4+ 5                  OBJECTIVE EXAMINATION     Observation: pt. With some end range stiffness today.  Test measurements: see above    Exercises/Interventions:   Therapeutic Ex (52049)  resistance Sets/time Reps Notes/Cues/Progressions          Table slide, push /pull, circles at finisher, 45's           Corner pec stretches  H10   5      Sub scap YOLETTE stretch cage  :10 10    Ladder climbing at true stretch  3 10    IR stretch with strap over shoulder  :10 15    Supine PNF D2 flexion YTB 1 20 L3       Prone at end of table with SB support: Shoulder ext B  Shoulder hoz. Abd L

## 2024-04-19 ENCOUNTER — HOSPITAL ENCOUNTER (OUTPATIENT)
Dept: PHYSICAL THERAPY | Age: 69
Setting detail: THERAPIES SERIES
Discharge: HOME | End: 2024-04-19
Payer: MEDICARE

## 2024-04-19 PROCEDURE — 97110 THERAPEUTIC EXERCISES: CPT | Performed by: PHYSICAL THERAPIST

## 2024-04-19 PROCEDURE — 97140 MANUAL THERAPY 1/> REGIONS: CPT | Performed by: PHYSICAL THERAPIST

## 2024-04-19 NOTE — FLOWSHEET NOTE
[facility]- Outpatient Rehabilitation and Therapy  [address], [place] [address] office: [phone] fax: [phone]    MEDICARE CAP EXCEPTION DOCUMENTATION      I certify that this patient meets one of the below criteria necessary for becoming an exception to the Medicare cap on therapy services:    [x]  The patient has a condition identified by an ICD-10 code that has a direct and significant impact on the need for therapy. (Significantly impacts the rate of recovery.)                   []  The patient has a complexity identified by an ICD-10 code that has a direct and significant impact on the need for therapy.  (Significantly impacts the rate of recovery and is associated with a primary condition.)         []  The patient has associated variables that influence the amount of treatment to include:  Social support, self-efficacy/motivation, prognosis, time since onset/acuity.         []  The patient has generalized musculoskeletal conditions or a condition affecting multiple sites that will have a direct impact on the rate of recovery.    []  The patient had a prior episode of outpatient therapy during this calendar year for a different condition.           []  The patient has a mental or cognitive disorder in addition to the condition being treated that will have a direct and significant impact on the rate of recovery.               Physical Therapy: TREATMENT/PROGRESS NOTE   Patient: Marley Otero (68 y.o. 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

## 2024-04-22 ENCOUNTER — HOSPITAL ENCOUNTER (OUTPATIENT)
Dept: PHYSICAL THERAPY | Age: 69
Setting detail: THERAPIES SERIES
Discharge: HOME | End: 2024-04-22
Payer: MEDICARE

## 2024-04-22 PROCEDURE — 97110 THERAPEUTIC EXERCISES: CPT | Performed by: PHYSICAL THERAPIST

## 2024-04-22 PROCEDURE — 97140 MANUAL THERAPY 1/> REGIONS: CPT | Performed by: PHYSICAL THERAPIST

## 2024-04-22 NOTE — FLOWSHEET NOTE
(Mobilization/manipulation, manual lymphatic drainage, manual traction) for the purpose of modulating pain, promoting relaxation,  increasing ROM, reducing/eliminating soft tissue swelling/inflammation/restriction, improving soft tissue extensibility and allowing for proper ROM for normal function with self care, mobility, lifting and ambulation    TREATMENT PLAN   Plan: Cont POC- Continue emphasis/focus on exercise progression and improving proper muscle recruitment and activation/motor control patterns. Next visit plan to progress weights     Electronically Signed by Clau Bynum PT, MSPT, OMT-C [id number]          Date: 04/22/2024     Note: If patient does not return for scheduled/recommended follow up visits, this note will serve as a discharge from care along with the most recent update on progress.

## 2024-04-25 ENCOUNTER — HOSPITAL ENCOUNTER (OUTPATIENT)
Dept: PHYSICAL THERAPY | Age: 69
Setting detail: THERAPIES SERIES
Discharge: HOME | End: 2024-04-25
Payer: MEDICARE

## 2024-04-25 PROCEDURE — 97140 MANUAL THERAPY 1/> REGIONS: CPT | Performed by: PHYSICAL THERAPIST

## 2024-04-25 PROCEDURE — 97110 THERAPEUTIC EXERCISES: CPT | Performed by: PHYSICAL THERAPIST

## 2024-04-25 NOTE — FLOWSHEET NOTE
procedure, 1 or more areas, each 15 minutes; neuromuscular reeducation of movement, balance, coordination, kinesthetic sense, posture, and/or proprioception for sitting and/or standing activities  (72914) MANUAL THERAPY -  Manual therapy techniques, 1 or more regions, each 15 minutes (Mobilization/manipulation, manual lymphatic drainage, manual traction) for the purpose of modulating pain, promoting relaxation,  increasing ROM, reducing/eliminating soft tissue swelling/inflammation/restriction, improving soft tissue extensibility and allowing for proper ROM for normal function with self care, mobility, lifting and ambulation    TREATMENT PLAN   Plan: Cont POC- Continue emphasis/focus on exercise progression and improving proper muscle recruitment and activation/motor control patterns. Next visit plan to progress weights progress lifting above shoulder height.    Electronically Signed by Clau Bynum PT, MSPT, OMT-C [id number]          Date: 04/25/2024     Note: If patient does not return for scheduled/recommended follow up visits, this note will serve as a discharge from care along with the most recent update on progress.

## 2024-04-29 ENCOUNTER — HOSPITAL ENCOUNTER (OUTPATIENT)
Dept: PHYSICAL THERAPY | Age: 69
Setting detail: THERAPIES SERIES
Discharge: HOME | End: 2024-04-29
Payer: MEDICARE

## 2024-04-29 PROCEDURE — 97110 THERAPEUTIC EXERCISES: CPT | Performed by: PHYSICAL THERAPIST

## 2024-04-29 PROCEDURE — 97112 NEUROMUSCULAR REEDUCATION: CPT | Performed by: PHYSICAL THERAPIST

## 2024-04-29 NOTE — FLOWSHEET NOTE
in all self-care activities.                                                                                                           Status: [x] Progressing: [] Met: [] Not Met: [] Adjusted    Overall Progression Towards Functional goals/ Treatment Progress Update:  [x] Patient is progressing as expected towards functional goals listed.    [] Progression is slowed due to complexities/Impairments listed.  [] Progression has been slowed due to co-morbidities.  [] Plan just implemented, too soon (<30days) to assess goals progression   [] Goals require adjustment due to lack of progress  [] Patient is not progressing as expected and requires additional follow up with physician  [] Other:     CHARGE CAPTURE     PT CHARGE GRID   CPT Code (TIMED) minutes # CPT Code (UNTIMED) #     [x] Therex (82114)  35 2  [] EVAL:LOW (41616 - Typically 20 minutes face-to-face)     [] Neuromusc. Re-ed (70777) 10 1  [] Re-Eval (56480)     [x] Manual (50572) 5   [] Estim Unattended (77944)     [] Ther. Act (54343)    [] Mech. Traction (16981)     [] Gait (88876)    [] Dry Needle 1-2 muscle (42012)     [] Aquatic Therex (05489)    [] Dry Needle 3+ muscle (20561)     [] Iontophoresis (93904)    [] VASO (92228)     [] Ultrasound (22747)    [] Group Therapy (42533)     [] Estim Attended (30974)    [] Other:     Total Timed Code Tx Minutes 50 3       Total Treatment Minutes 60        Charge Justification:  (95763) THERAPEUTIC EXERCISE - Provided verbal/tactile cueing for activities related to strengthening, flexibility, endurance, ROM performed to prevent loss of range of motion, maintain or improve muscular strength or increase flexibility, following either an injury or surgery.   (36755) NEUROMUSCULAR RE-EDUCATION - Therapeutic procedure, 1 or more areas, each 15 minutes; neuromuscular reeducation of movement, balance, coordination, kinesthetic sense, posture, and/or proprioception for sitting and/or standing activities  (16451) MANUAL

## 2024-05-03 ENCOUNTER — APPOINTMENT (OUTPATIENT)
Dept: PHYSICAL THERAPY | Age: 69
End: 2024-05-03
Payer: MEDICARE

## 2024-05-03 ENCOUNTER — HOSPITAL ENCOUNTER (OUTPATIENT)
Dept: PHYSICAL THERAPY | Age: 69
Setting detail: THERAPIES SERIES
Discharge: HOME | End: 2024-05-03
Payer: MEDICARE

## 2024-05-03 PROCEDURE — 97110 THERAPEUTIC EXERCISES: CPT | Performed by: PHYSICAL THERAPIST

## 2024-05-03 PROCEDURE — 97140 MANUAL THERAPY 1/> REGIONS: CPT | Performed by: PHYSICAL THERAPIST

## 2024-05-03 NOTE — FLOWSHEET NOTE
[facility]- Outpatient Rehabilitation and Therapy  [address], [place] [address] office: [phone] fax: [phone]    MEDICARE CAP EXCEPTION DOCUMENTATION      I certify that this patient meets one of the below criteria necessary for becoming an exception to the Medicare cap on therapy services:    [x]  The patient has a condition identified by an ICD-10 code that has a direct and significant impact on the need for therapy. (Significantly impacts the rate of recovery.)                   []  The patient has a complexity identified by an ICD-10 code that has a direct and significant impact on the need for therapy.  (Significantly impacts the rate of recovery and is associated with a primary condition.)         []  The patient has associated variables that influence the amount of treatment to include:  Social support, self-efficacy/motivation, prognosis, time since onset/acuity.         []  The patient has generalized musculoskeletal conditions or a condition affecting multiple sites that will have a direct impact on the rate of recovery.    []  The patient had a prior episode of outpatient therapy during this calendar year for a different condition.           []  The patient has a mental or cognitive disorder in addition to the condition being treated that will have a direct and significant impact on the rate of recovery.               Physical Therapy: TREATMENT/PROGRESS NOTE   Patient: Marley Otero (68 y.o. female)   Treatment Date: 2024   :  1955 MRN: [id number]   Visit #:33 (16 In )  Insurance Allowable Auth Needed   BMN []Yes    [x]No    Insurance: Payor: MEDICARE / Plan: MEDICARE PART A AND B / Product Type: *No Product type* /   Insurance ID: [id number] - (Medicare)  Secondary Insurance (if applicable):    Treatment Diagnosis: Left shoulder pain M25.512      Medical Diagnosis:    S/P shoulder replacement, left [Z96.612]   Referring Physician: Bob

## 2024-05-06 ENCOUNTER — HOSPITAL ENCOUNTER (OUTPATIENT)
Dept: PHYSICAL THERAPY | Age: 69
Setting detail: THERAPIES SERIES
Discharge: HOME | End: 2024-05-06
Payer: MEDICARE

## 2024-05-06 ENCOUNTER — OFFICE VISIT (OUTPATIENT)
Dept: ORTHOPEDIC SURGERY | Age: 69
End: 2024-05-06
Payer: MEDICARE

## 2024-05-06 VITALS — BODY MASS INDEX: 28.88 KG/M2 | HEIGHT: 67 IN | WEIGHT: 184 LBS

## 2024-05-06 DIAGNOSIS — Z96.612 S/P SHOULDER REPLACEMENT, LEFT: Primary | ICD-10-CM

## 2024-05-06 PROCEDURE — G8427 DOCREV CUR MEDS BY ELIG CLIN: HCPCS | Performed by: PHYSICIAN ASSISTANT

## 2024-05-06 PROCEDURE — 3017F COLORECTAL CA SCREEN DOC REV: CPT | Performed by: PHYSICIAN ASSISTANT

## 2024-05-06 PROCEDURE — 97110 THERAPEUTIC EXERCISES: CPT | Performed by: PHYSICAL THERAPIST

## 2024-05-06 PROCEDURE — G8417 CALC BMI ABV UP PARAM F/U: HCPCS | Performed by: PHYSICIAN ASSISTANT

## 2024-05-06 PROCEDURE — 1123F ACP DISCUSS/DSCN MKR DOCD: CPT | Performed by: PHYSICIAN ASSISTANT

## 2024-05-06 PROCEDURE — 99213 OFFICE O/P EST LOW 20 MIN: CPT | Performed by: PHYSICIAN ASSISTANT

## 2024-05-06 PROCEDURE — 1036F TOBACCO NON-USER: CPT | Performed by: PHYSICIAN ASSISTANT

## 2024-05-06 PROCEDURE — G8399 PT W/DXA RESULTS DOCUMENT: HCPCS | Performed by: PHYSICIAN ASSISTANT

## 2024-05-06 PROCEDURE — 97140 MANUAL THERAPY 1/> REGIONS: CPT | Performed by: PHYSICAL THERAPIST

## 2024-05-06 PROCEDURE — 1090F PRES/ABSN URINE INCON ASSESS: CPT | Performed by: PHYSICIAN ASSISTANT

## 2024-05-06 NOTE — FLOWSHEET NOTE
Progression is slowed due to complexities/Impairments listed.  [] Progression has been slowed due to co-morbidities.  [] Plan just implemented, too soon (<30days) to assess goals progression   [] Goals require adjustment due to lack of progress  [] Patient is not progressing as expected and requires additional follow up with physician  [] Other:     CHARGE CAPTURE     PT CHARGE GRID   CPT Code (TIMED) minutes # CPT Code (UNTIMED) #     [x] Therex (39714)  52 3  [] EVAL:LOW (43881 - Typically 20 minutes face-to-face)     [x] Neuromusc. Re-ed (68911)    [] Re-Eval (26819)     [x] Manual (34686) 8 1  [] Estim Unattended (92469)     [] Ther. Act (93502)    [] Mech. Traction (36230)     [] Gait (10043)    [] Dry Needle 1-2 muscle (20560)     [] Aquatic Therex (01933)    [] Dry Needle 3+ muscle (20561)     [] Iontophoresis (40247)    [] VASO (71302)     [] Ultrasound (64135)    [] Group Therapy (41262)     [] Estim Attended (40004)    [] Other:     Total Timed Code Tx Minutes 60 4       Total Treatment Minutes 70        Charge Justification:  (29633) THERAPEUTIC EXERCISE - Provided verbal/tactile cueing for activities related to strengthening, flexibility, endurance, ROM performed to prevent loss of range of motion, maintain or improve muscular strength or increase flexibility, following either an injury or surgery.   (20977) NEUROMUSCULAR RE-EDUCATION - Therapeutic procedure, 1 or more areas, each 15 minutes; neuromuscular reeducation of movement, balance, coordination, kinesthetic sense, posture, and/or proprioception for sitting and/or standing activities  (16068) MANUAL THERAPY -  Manual therapy techniques, 1 or more regions, each 15 minutes (Mobilization/manipulation, manual lymphatic drainage, manual traction) for the purpose of modulating pain, promoting relaxation,  increasing ROM, reducing/eliminating soft tissue swelling/inflammation/restriction, improving soft tissue extensibility and allowing for proper ROM

## 2024-05-06 NOTE — PROGRESS NOTES
Branden that her history, symptoms, signs, and imaging are most consistent with status post total shoulder arthroplasty.    We reviewed the natural history of these conditions and treatment options ranging from conservative measures (rest, icing, activity modification, physical therapy, pain meds, cortisone injection) to surgical options.     In terms of treatment, I recommended continuing with rest, icing, avoidance of painful activities, NSAIDs or pain meds as tolerated, and physical therapy.     If these are not effective, cortisone injection can be considered.  We discussed surgical options as well, should conservative measures fail.       Electronically signed by Desmond Wayne MD on 10/26/2023 at 10:36 AM  This dictation was generated by voice recognition computer software.  Although all attempts are made to edit the dictation for accuracy, there may be errors in the transcription that are not intended.

## 2024-05-09 ENCOUNTER — HOSPITAL ENCOUNTER (OUTPATIENT)
Dept: PHYSICAL THERAPY | Age: 69
Setting detail: THERAPIES SERIES
Discharge: HOME | End: 2024-05-09
Payer: MEDICARE

## 2024-05-09 PROCEDURE — 97110 THERAPEUTIC EXERCISES: CPT | Performed by: PHYSICAL THERAPIST

## 2024-05-09 NOTE — FLOWSHEET NOTE
no bleeding at site/no pain/no fever
weekly - 3 sets - 10 reps  - Supine Shoulder Circles with Weight  - 2 x daily - 7 x weekly - 3 sets - 10 reps  - Supine Chest Press with Dumbbells  - 2 x daily - 7 x weekly - 3 sets - 10 reps  - Supine Shoulder Flexion with Free Weight  - 2 x daily - 7 x weekly - 3 sets - 10 reps  - Supine Shoulder Horizontal Abduction with Dumbbells  - 2 x daily - 7 x weekly - 3 sets - 10 reps  - Sidelying Shoulder ER with Towel and Dumbbell  - 2 x daily - 7 x weekly - 3 sets - 10 reps  - Sidelying Shoulder Abduction Palm Forward  - 2 x daily - 7 x weekly - 3 sets - 10 reps  - Shoulder Flexion Wall Slide with Towel  - 1 x daily - 7 x weekly - 1 sets - 10 reps  - Standing shoulder flexion wall slides  - 1 x daily - 7 x weekly - 1 sets - 10 reps    ASSESSMENT     Today's Assessment:  still fatigues with ex. Against gravity.  Not ready for increase in weight today.  Cues required for performance of prone and scapular ex.  Tight with inf glide but normalized with post glide.    Medical Necessity Documentation:  I certify that this patient meets the below criteria necessary for medical necessity for care and/or justification of therapy services:  The patient has a musculoskeletal condition(s) with a corresponding ICD-10 code that is of complexity and severity that require skilled therapeutic intervention. This has a direct and significant impact on the need for therapy and significantly impacts the rate of recovery.   The patient had a prior episode of outpatient therapy during this calendar year for a different condition.  Current diagnosis requires skilled therapeutic intervention.    Treatment/Activity Tolerance:  [x] Patient tolerated treatment well [] Patient limited by fatique  [] Patient limited by pain  [] Patient limited by other medical complications  [] Other:     Return to Play: NA    Prognosis for POC: [x] Good [] Fair  [] Poor    Patient requires continued skilled intervention: [x] Yes  [] No      GOALS     Patient stated

## 2024-05-13 ENCOUNTER — HOSPITAL ENCOUNTER (OUTPATIENT)
Dept: PHYSICAL THERAPY | Age: 69
Setting detail: THERAPIES SERIES
Discharge: HOME | End: 2024-05-13
Payer: MEDICARE

## 2024-05-13 PROCEDURE — 97112 NEUROMUSCULAR REEDUCATION: CPT | Performed by: PHYSICAL THERAPIST

## 2024-05-13 PROCEDURE — 97110 THERAPEUTIC EXERCISES: CPT | Performed by: PHYSICAL THERAPIST

## 2024-05-13 NOTE — FLOWSHEET NOTE
minutes; neuromuscular reeducation of movement, balance, coordination, kinesthetic sense, posture, and/or proprioception for sitting and/or standing activities    TREATMENT PLAN   Plan: Cont POC- Continue emphasis/focus on exercise progression, improving proper muscle recruitment and activation/motor control patterns, and allowing for proper ROM. Next visit plan to normalize strength for reaching above shoulder height, including scaption   Electronically Signed by Clau Bynum PT, MSPT, OMT-C [id number]          Date: 05/13/2024     Note: If patient does not return for scheduled/recommended follow up visits, this note will serve as a discharge from care along with the most recent update on progress.

## 2024-05-17 ENCOUNTER — HOSPITAL ENCOUNTER (OUTPATIENT)
Dept: PHYSICAL THERAPY | Age: 69
Setting detail: THERAPIES SERIES
Discharge: HOME | End: 2024-05-17
Payer: MEDICARE

## 2024-05-17 PROCEDURE — 97112 NEUROMUSCULAR REEDUCATION: CPT | Performed by: PHYSICAL THERAPIST

## 2024-05-17 PROCEDURE — 97110 THERAPEUTIC EXERCISES: CPT | Performed by: PHYSICAL THERAPIST

## 2024-05-17 NOTE — FLOWSHEET NOTE
ESMER Martinez  PCP: Kim Manzanares MD                             Plan of care signed (Y/N):     Date of Patient follow up with Physician:      Progress Report/POC: NO   POC update due: (10 visits /OR AUTH LIMITS, whichever is less)  6/12/2023     Precautions/ Contra-indications:                                                                                          Latex allergy:  NO  Pacemaker:    NO  Contraindications for Manipulation: osteoporosis  and recent surgical history (relative)  Date of Surgery: 10/9/23 TSR  Other:      Red Flags:  None    Preferred Language for Healthcare:   [x]English       []other:    SUBJECTIVE EXAMINATION     Subjective: pt. Reports that the shoulder is lifting better today, able to lift it normally again.       Test used Initial score  11/6/23 12/04/2023 12/22/23 1/8/24 2/9/24 3/11/24 4/8/24 5/13/24   Pain Summary VAS 6-8 2/10 2/10 \"achy\" 2/10 2/10 stiff 0-2/10 stiffnesss 0-2/10 0-1/10   Functional questionnaire Quick DASH 49=86% 45=77%    41/55 68%  18/55=20%   Other:            Shoulder flexion   135 147 AAROM  AROM 125 AROM 130, 150 supine AROM shoulder flexion 140 Supine 170 140 AROM   Shoulder Scaption  115 142  125 AROM 165 supine 150 170 156 AROM, 163  AAROM   Shoulder ER  0-3 40  L spine of scap 60 at 90 supine, T1 T1 85 at 90 T2   Shoulder IR     L rib 12 85 supine T 11 T10 80 at 90 supine T9   MMT            flexion     3+ 4 4 4 4+   scaption         4   ER     3+ 4 4-4+ 4+ 4+   IR     3+ 4 4+ 5 5                   OBJECTIVE EXAMINATION     Observation:   Test measurements: see above    Exercises/Interventions:   Therapeutic Ex (64042)  resistance Sets/time Reps Notes/Cues/Progressions          Table slide, push /pull, circles at finisher, 45's           Corner pec stretches  H10   5      Sub scap YOLETTE stretch cage  :10 10    Ladder climbing at true stretch  2 10    IR stretch with strap over shoulder  HEP     Supine PNF D2 flexion RTB Cues needed to correct arm

## 2024-05-20 ENCOUNTER — HOSPITAL ENCOUNTER (OUTPATIENT)
Dept: PHYSICAL THERAPY | Age: 69
Setting detail: THERAPIES SERIES
Discharge: HOME | End: 2024-05-20
Payer: MEDICARE

## 2024-05-20 PROCEDURE — 97110 THERAPEUTIC EXERCISES: CPT | Performed by: PHYSICAL THERAPIST

## 2024-05-20 PROCEDURE — 97140 MANUAL THERAPY 1/> REGIONS: CPT | Performed by: PHYSICAL THERAPIST

## 2024-05-20 NOTE — FLOWSHEET NOTE
ESMER Martinez  PCP: Kim Manzanares MD                             Plan of care signed (Y/N):     Date of Patient follow up with Physician:      Progress Report/POC: NO   POC update due: (10 visits /OR AUTH LIMITS, whichever is less)  6/12/2023     Precautions/ Contra-indications:                                                                                          Latex allergy:  NO  Pacemaker:    NO  Contraindications for Manipulation: osteoporosis  and recent surgical history (relative)  Date of Surgery: 10/9/23 TSR  Other:      Red Flags:  None    Preferred Language for Healthcare:   [x]English       []other:    SUBJECTIVE EXAMINATION     Subjective: pt. Reports that she is still able to lift arm forward pretty good, out to the side is difficult.       Test used Initial score  11/6/23 12/04/2023 12/22/23 1/8/24 2/9/24 3/11/24 4/8/24 5/13/24   Pain Summary VAS 6-8 2/10 2/10 \"achy\" 2/10 2/10 stiff 0-2/10 stiffnesss 0-2/10 0-1/10   Functional questionnaire Quick DASH 49=86% 45=77%    41/55 68%  18/55=20%   Other:            Shoulder flexion   135 147 AAROM  AROM 125 AROM 130, 150 supine AROM shoulder flexion 140 Supine 170 140 AROM   Shoulder Scaption  115 142  125 AROM 165 supine 150 170 156 AROM, 163  AAROM   Shoulder ER  0-3 40  L spine of scap 60 at 90 supine, T1 T1 85 at 90 T2   Shoulder IR     L rib 12 85 supine T 11 T10 80 at 90 supine T9   MMT            flexion     3+ 4 4 4 4+   scaption         4   ER     3+ 4 4-4+ 4+ 4+   IR     3+ 4 4+ 5 5                   OBJECTIVE EXAMINATION     Observation: MMT L shoulder ER 5-, IR 5-, flexion 4-, abd 3+-4-  Test measurements: see above    Exercises/Interventions:   Therapeutic Ex (94277)  resistance Sets/time Reps Notes/Cues/Progressions          Table slide, push /pull, circles at finisher, 45's           True stretch pec stretches  H10   5      Sub scap YOLETTE stretch cage  :10 10    Ladder climbing at true stretch     IR stretch with strap over shoulder

## 2024-05-23 ENCOUNTER — HOSPITAL ENCOUNTER (OUTPATIENT)
Dept: PHYSICAL THERAPY | Age: 69
Setting detail: THERAPIES SERIES
Discharge: HOME | End: 2024-05-23
Payer: MEDICARE

## 2024-05-23 PROCEDURE — 97110 THERAPEUTIC EXERCISES: CPT | Performed by: PHYSICAL THERAPIST

## 2024-05-23 NOTE — FLOWSHEET NOTE
flexion RTB Cues needed to correct arm position   Supine SA punches     Prone : Shoulder ext L only  Shoulder hoz. Abd L only  Prone Y  Fletcher 6   3#  2#  0#    3  3  2    10  10  10              Supine ER at 90 abd with band anchored to L foot, knee flexed GTB 3 10    Supine shoulder Flexion,   SL shoulder abd   2#   3   10        Wall slide flexion, scaption, D1 flexion         Wall scap clocks with band YTB 11, 9, 7 o'clock   Pull backs RTB Required               CC triceps 25#    CC B shld ext 25# Cues for keeping the elbows straight and wrist neutral   CC biceps 20#    CC lawnmower pull 20#    CC LPD 45# 3 10              PNF lift R/L 2# ball    Manual Intervention (89685)  TIME            GHJ mobs inf and post glide  PROM flexion/ER/IR,  STW bicep    Cervical distraction          STW L pec and bicep                         NMR re-education (98834) resistance Sets/time Reps CUES NEEDED                 Ball on table-alphabet  2x  Mod cues   AROM I, Y, 2#/2#/3  3  10  10  Modified due to fatigue today     Modalities:    Hot Pack- left shoulder x 10 min  Hot Pack- neck x 10 min    Education/Home Exercise Program: Patient HEP program created electronically.  Refer to [employer] access code:    Access Code: [email or web address]  URL: [email or web address]  Date: 02/26/2024  Prepared by: Clau Bynum    Exercises  - Standing Single Arm Shoulder Flexion Towel Slide at Table Top  - 1-2 x daily - 7 x weekly - 1 sets - 10 reps  - Seated Cervical Sidebending Stretch  - 2 x daily - 7 x weekly - 1 sets - 5 reps - 30 hold  - Seated Scapular Retraction  - 3 x daily - 7 x weekly - 2 sets - 10 reps - 1-2 hold  - Supine Shoulder Flexion Extension AAROM with Dowel  - 2 x daily - 7 x weekly - 1 sets - 15 reps  - Doorway Pec Stretch at 60 Elevation  - 1 x daily - 7 x weekly - 1 sets - 10 reps - 10 hold  - Standing Low Shoulder Row with Anchored Resistance  - 2 x daily - 7 x weekly - 3 sets - 10 reps  - Shoulder extension with

## 2024-05-28 ENCOUNTER — HOSPITAL ENCOUNTER (OUTPATIENT)
Dept: WOMENS IMAGING | Age: 69
Discharge: HOME | End: 2024-05-28
Payer: MEDICARE

## 2024-05-28 ENCOUNTER — APPOINTMENT (OUTPATIENT)
Dept: PHYSICAL THERAPY | Age: 69
End: 2024-05-28
Payer: MEDICARE

## 2024-05-28 VITALS — WEIGHT: 180 LBS | BODY MASS INDEX: 28.93 KG/M2 | HEIGHT: 66 IN

## 2024-05-28 DIAGNOSIS — Z12.31 SCREENING MAMMOGRAM FOR BREAST CANCER: ICD-10-CM

## 2024-05-28 PROCEDURE — 77063 BREAST TOMOSYNTHESIS BI: CPT

## 2024-05-31 ENCOUNTER — HOSPITAL ENCOUNTER (OUTPATIENT)
Dept: PHYSICAL THERAPY | Age: 69
Setting detail: THERAPIES SERIES
Discharge: HOME | End: 2024-05-31
Payer: MEDICARE

## 2024-05-31 PROCEDURE — 97110 THERAPEUTIC EXERCISES: CPT | Performed by: PHYSICAL THERAPIST

## 2024-05-31 NOTE — FLOWSHEET NOTE
Met: [] Not Met: [] Adjusted    Overall Progression Towards Functional goals/ Treatment Progress Update:  [x] Patient is progressing as expected towards functional goals listed.    [] Progression is slowed due to complexities/Impairments listed.  [] Progression has been slowed due to co-morbidities.  [] Plan just implemented, too soon (<30days) to assess goals progression   [] Goals require adjustment due to lack of progress  [] Patient is not progressing as expected and requires additional follow up with physician  [] Other:     CHARGE CAPTURE     PT CHARGE GRID   CPT Code (TIMED) minutes # CPT Code (UNTIMED) #     [x] Therex (66476)  40 3  [] EVAL:LOW (25525 - Typically 20 minutes face-to-face)     [] Neuromusc. Re-ed (09505)    [] Re-Eval (64947)     [] Manual (41458) 5   [] Estim Unattended (32753)     [] Ther. Act (30276)    [] Mech. Traction (35332)     [] Gait (18951)    [] Dry Needle 1-2 muscle (20560)     [] Aquatic Therex (44040)    [] Dry Needle 3+ muscle (20561)     [] Iontophoresis (08288)    [] VASO (89389)     [] Ultrasound (64576)    [] Group Therapy (09543)     [] Estim Attended (59580)    [] Other:     Total Timed Code Tx Minutes 45 3       Total Treatment Minutes 55        Charge Justification:  (86906) THERAPEUTIC EXERCISE - Provided verbal/tactile cueing for activities related to strengthening, flexibility, endurance, ROM performed to prevent loss of range of motion, maintain or improve muscular strength or increase flexibility, following either an injury or surgery.   (55604) NEUROMUSCULAR RE-EDUCATION - Therapeutic procedure, 1 or more areas, each 15 minutes; neuromuscular reeducation of movement, balance, coordination, kinesthetic sense, posture, and/or proprioception for sitting and/or standing activities    TREATMENT PLAN   Plan: Cont POC- Continue emphasis/focus on exercise progression, improving proper muscle recruitment and activation/motor control patterns, and allowing for proper ROM.

## 2024-06-06 ENCOUNTER — HOSPITAL ENCOUNTER (OUTPATIENT)
Dept: PHYSICAL THERAPY | Age: 69
Setting detail: THERAPIES SERIES
Discharge: HOME | End: 2024-06-06
Payer: MEDICARE

## 2024-06-06 PROCEDURE — 97110 THERAPEUTIC EXERCISES: CPT | Performed by: PHYSICAL THERAPIST

## 2024-06-06 PROCEDURE — 97112 NEUROMUSCULAR REEDUCATION: CPT | Performed by: PHYSICAL THERAPIST

## 2024-06-06 NOTE — FLOWSHEET NOTE
POC- Continue emphasis/focus on exercise progression, improving proper muscle recruitment and activation/motor control patterns, and allowing for proper ROM. Next visit plan to normalize strength for reaching above shoulder height, including scaption cont. 1x week 1-3 week then DC.  Electronically Signed by Clau Bynum PT, MSPT, OMT-C [id number]          Date: 06/06/2024     Note: If patient does not return for scheduled/recommended follow up visits, this note will serve as a discharge from care along with the most recent update on progress.

## 2024-06-13 ENCOUNTER — HOSPITAL ENCOUNTER (OUTPATIENT)
Dept: PHYSICAL THERAPY | Age: 69
Setting detail: THERAPIES SERIES
Discharge: HOME | End: 2024-06-13
Payer: MEDICARE

## 2024-06-13 PROCEDURE — 97110 THERAPEUTIC EXERCISES: CPT | Performed by: PHYSICAL THERAPIST

## 2024-06-13 PROCEDURE — 97112 NEUROMUSCULAR REEDUCATION: CPT | Performed by: PHYSICAL THERAPIST

## 2024-06-13 NOTE — FLOWSHEET NOTE
[facility]- [facility]  [address], [place] [address] office: [phone] fax: [phone]    Physical Therapy Re-Certification Plan of Care    Dear Juan Rojas PA  ,    We had the pleasure of treating the following patient for physical therapy services at [facility]. A summary of our findings can be found in the updated assessment below.  This includes our plan of care.  If you have any questions or concerns regarding these findings, please do not hesitate to contact me at the office phone number checked above.  Thank you for the referral.     Physician Signature:________________________________Date:__________________  By signing above (or electronic signature), therapist's plan is approved by physician      Functional Outcome:   Marley Otero 1955 continues to present with functional deficits in endurance of strength  limiting ability with lifting items and heavy home activity .  During therapy this date, patient required verbal cueing for  correct form with ex . Patient will continue to benefit from ongoing evaluation and advanced clinical decision from a Physical Therapist to improve muscle strength to safely return to PLOF and ADLs/IADLs without symptoms or restrictions.    Overall Response to Treatment:  Patient is responding well to treatment and improvement is noted with regards to goals    Total Visits: 43     Recommendation:    [x] Continue PT 1x / wk for 1 weeks in 2 weeks for final appt. And HEP check.   [] Hold PT, pending MD visit   [] Discharge to HEP. Follow up with PT or MD PRN.    MEDICARE CAP EXCEPTION DOCUMENTATION      I certify that this patient meets one of the below criteria necessary for becoming an exception to the Medicare cap on therapy services:    [x]  The patient has a condition identified by an ICD-10 code that has a direct and significant impact on the need for therapy.

## 2024-06-20 ENCOUNTER — APPOINTMENT (OUTPATIENT)
Dept: PHYSICAL THERAPY | Age: 69
End: 2024-06-20
Payer: MEDICARE

## 2024-06-27 ENCOUNTER — HOSPITAL ENCOUNTER (OUTPATIENT)
Dept: PHYSICAL THERAPY | Age: 69
Setting detail: THERAPIES SERIES
Discharge: HOME | End: 2024-06-27
Payer: MEDICARE

## 2024-06-27 PROCEDURE — 97110 THERAPEUTIC EXERCISES: CPT | Performed by: PHYSICAL THERAPIST

## 2024-06-27 NOTE — FLOWSHEET NOTE
[facility]- Outpatient Rehabilitation [facility] Therapy  [address], [place] [address] office: [phone] fax: [phone]    MEDICARE CAP EXCEPTION DOCUMENTATION      I certify that this patient meets one of the below criteria necessary for becoming an exception to the Medicare cap on therapy services:    [x]  The patient has a condition identified by an ICD-10 code that has a direct and significant impact on the need for therapy. (Significantly impacts the rate of recovery.)                   []  The patient has a complexity identified by an ICD-10 code that has a direct and significant impact on the need for therapy.  (Significantly impacts the rate of recovery and is associated with a primary condition.)         []  The patient has associated variables that influence the amount of treatment to include:  Social support, self-efficacy/motivation, prognosis, time since onset/acuity.         []  The patient has generalized musculoskeletal conditions or a condition affecting multiple sites that will have a direct impact on the rate of recovery.    []  The patient had a prior episode of outpatient therapy during this calendar year for a different condition.           []  The patient has a mental or cognitive disorder in addition to the condition being treated that will have a direct and significant impact on the rate of recovery.               Physical Therapy: TREATMENT/PROGRESS NOTE   Patient: Marley Otero (68 y.o. female)   Treatment Date: 2024   :  1955 MRN: [id number]   Visit #:44 (16 In )  Insurance Allowable Auth Needed   BMN []Yes    [x]No    Insurance: Payor: MEDICARE / Plan: MEDICARE PART A AND B / Product Type: *No Product type* /   Insurance ID: [id number] - (Medicare)  Secondary Insurance (if applicable):    Treatment Diagnosis: Left shoulder pain M25.512      Medical Diagnosis:    S/P shoulder replacement, left [Z96.612]   Referring Physician: Bob

## 2024-09-10 ENCOUNTER — TELEPHONE (OUTPATIENT)
Dept: INTERNAL MEDICINE CLINIC | Age: 69
End: 2024-09-10

## 2024-10-07 ENCOUNTER — OFFICE VISIT (OUTPATIENT)
Dept: ORTHOPEDIC SURGERY | Age: 69
End: 2024-10-07
Payer: MEDICARE

## 2024-10-07 VITALS — WEIGHT: 180 LBS | BODY MASS INDEX: 28.93 KG/M2 | HEIGHT: 66 IN

## 2024-10-07 DIAGNOSIS — Z96.612 S/P SHOULDER REPLACEMENT, LEFT: Primary | ICD-10-CM

## 2024-10-07 DIAGNOSIS — R52 PAIN: ICD-10-CM

## 2024-10-07 DIAGNOSIS — M19.011 LOCALIZED OSTEOARTHRITIS OF RIGHT SHOULDER: ICD-10-CM

## 2024-10-07 PROCEDURE — G8428 CUR MEDS NOT DOCUMENT: HCPCS | Performed by: PHYSICIAN ASSISTANT

## 2024-10-07 PROCEDURE — G8417 CALC BMI ABV UP PARAM F/U: HCPCS | Performed by: PHYSICIAN ASSISTANT

## 2024-10-07 PROCEDURE — 99214 OFFICE O/P EST MOD 30 MIN: CPT | Performed by: PHYSICIAN ASSISTANT

## 2024-10-07 PROCEDURE — 1036F TOBACCO NON-USER: CPT | Performed by: PHYSICIAN ASSISTANT

## 2024-10-07 PROCEDURE — 3017F COLORECTAL CA SCREEN DOC REV: CPT | Performed by: PHYSICIAN ASSISTANT

## 2024-10-07 PROCEDURE — G8484 FLU IMMUNIZE NO ADMIN: HCPCS | Performed by: PHYSICIAN ASSISTANT

## 2024-10-07 PROCEDURE — 1090F PRES/ABSN URINE INCON ASSESS: CPT | Performed by: PHYSICIAN ASSISTANT

## 2024-10-07 PROCEDURE — 1123F ACP DISCUSS/DSCN MKR DOCD: CPT | Performed by: PHYSICIAN ASSISTANT

## 2024-10-07 PROCEDURE — G8399 PT W/DXA RESULTS DOCUMENT: HCPCS | Performed by: PHYSICIAN ASSISTANT

## 2024-10-07 NOTE — PROGRESS NOTES
Dr Desmond Wayne      Date /Time 10/7/2024             8:00 AM EDT  Name Marley Otero             1955   Location  [facility] ORTHO  MRN [id number]                Chief Complaint   Patient presents with    Follow-up     Ck Left Total Shoulder Replacement 10/09/2023        History of Present Illness    Marley Otero is a 69 y.o. female who presents with  left Shoulder pain.    Sent in consultation by Kim Manzanares MD, .      Athletic/exercise activity: no sports.  Injury Mechanism:  none.  Worker's Comp. & legal issues:   none.  Previous Treatments: Ice, Heat, and NSAIDs    Patient presents to the office today for a follow-up visit.  Patient is almost 1 year status post left total shoulder arthroplasty.  Patient doing well.  Pain controlled.  Patient denies any fever, chills or drainage.  Patient's activity level has been able to increase as has her function from preop.    Patient would also like to be seen for her right shoulder.  Her right shoulder does have pain.  It is increasingly painful with activities and with lying on the region.  No injury.  Pain both anterior and posterior    Past Medical History  Past Medical History:   Diagnosis Date    Chronic renal disease, stage III ([facility]) [005686] 07/25/2023    DJD (degenerative joint disease) of knee     GERD (gastroesophageal reflux disease)     Hypercholesteremia 04/15/2021    Osteopenia     PONV (postoperative nausea and vomiting)     Postmenopausal HRT (hormone replacement therapy)      Past Surgical History:   Procedure Laterality Date    COLONOSCOPY  01/08/2008    Dr. Kwasi Estrada    CYSTOSCOPY Bilateral 9/6/2023    CYSTOSCOPY, BILATERAL RETROGRADE PYELOGRAM, performed by Leonard Lanza MD at [facility]    DILATION AND CURETTAGE OF UTERUS      ectopic pregnancy at age 18    SHOULDER ARTHROPLASTY Left 10/9/2023    LEFT TOTAL SHOULDER REPLACEMENT, BICEPS TENODESIS FX SHOULDER performed by Desmond Wayne MD at [facility] OR

## 2025-05-13 ENCOUNTER — HOSPITAL ENCOUNTER (OUTPATIENT)
Dept: WOMENS IMAGING | Age: 70
Discharge: HOME | End: 2025-05-13
Payer: MEDICARE

## 2025-05-13 VITALS — BODY MASS INDEX: 29.05 KG/M2 | HEIGHT: 66 IN

## 2025-05-13 DIAGNOSIS — Z12.31 ENCOUNTER FOR SCREENING MAMMOGRAM FOR MALIGNANT NEOPLASM OF BREAST: ICD-10-CM

## 2025-05-13 PROCEDURE — 77063 BREAST TOMOSYNTHESIS BI: CPT

## 2025-05-14 ENCOUNTER — RESULTS FOLLOW-UP (OUTPATIENT)
Dept: INTERNAL MEDICINE CLINIC | Age: 70
End: 2025-05-14

## 2025-05-14 ENCOUNTER — TELEPHONE (OUTPATIENT)
Dept: WOMENS IMAGING | Age: 70
End: 2025-05-14

## 2025-05-14 ENCOUNTER — TELEPHONE (OUTPATIENT)
Dept: INTERNAL MEDICINE CLINIC | Age: 70
End: 2025-05-14

## 2025-05-14 DIAGNOSIS — R92.8 ABNORMAL MAMMOGRAM: Primary | ICD-10-CM

## 2025-05-14 NOTE — TELEPHONE ENCOUNTER
Left message to review results of screening mammogram and schedule follow up.     Karen Bolden RN

## 2025-05-15 ENCOUNTER — HOSPITAL ENCOUNTER (OUTPATIENT)
Dept: WOMENS IMAGING | Age: 70
Discharge: HOME | End: 2025-05-15
Payer: MEDICARE

## 2025-05-15 ENCOUNTER — HOSPITAL ENCOUNTER (OUTPATIENT)
Dept: WOMENS IMAGING | Age: 70
End: 2025-05-15
Payer: MEDICARE

## 2025-05-15 DIAGNOSIS — R92.8 ABNORMAL FINDING ON BREAST IMAGING: ICD-10-CM

## 2025-05-15 PROCEDURE — G0279 TOMOSYNTHESIS, MAMMO: HCPCS

## 2025-06-10 ENCOUNTER — OFFICE VISIT (OUTPATIENT)
Dept: INTERNAL MEDICINE CLINIC | Age: 70
End: 2025-06-10

## 2025-06-10 VITALS
SYSTOLIC BLOOD PRESSURE: 120 MMHG | WEIGHT: 191 LBS | HEART RATE: 70 BPM | DIASTOLIC BLOOD PRESSURE: 80 MMHG | HEIGHT: 66 IN | RESPIRATION RATE: 12 BRPM | BODY MASS INDEX: 30.7 KG/M2

## 2025-06-10 DIAGNOSIS — Z00.00 MEDICARE ANNUAL WELLNESS VISIT, SUBSEQUENT: Primary | ICD-10-CM

## 2025-06-10 DIAGNOSIS — E78.00 HYPERCHOLESTEREMIA: ICD-10-CM

## 2025-06-10 DIAGNOSIS — Z96.612 S/P REVERSE TOTAL SHOULDER ARTHROPLASTY, LEFT: ICD-10-CM

## 2025-06-10 PROBLEM — M54.41 CHRONIC BILATERAL LOW BACK PAIN WITH RIGHT-SIDED SCIATICA: Status: RESOLVED | Noted: 2017-05-31 | Resolved: 2025-06-10

## 2025-06-10 PROBLEM — G89.29 CHRONIC BILATERAL LOW BACK PAIN WITH RIGHT-SIDED SCIATICA: Status: RESOLVED | Noted: 2017-05-31 | Resolved: 2025-06-10

## 2025-06-10 PROBLEM — N18.30 CHRONIC RENAL DISEASE, STAGE III (HCC): Status: RESOLVED | Noted: 2023-07-25 | Resolved: 2025-06-10

## 2025-06-10 PROCEDURE — 3017F COLORECTAL CA SCREEN DOC REV: CPT | Performed by: INTERNAL MEDICINE

## 2025-06-10 PROCEDURE — 1159F MED LIST DOCD IN RCRD: CPT | Performed by: INTERNAL MEDICINE

## 2025-06-10 PROCEDURE — G0439 PPPS, SUBSEQ VISIT: HCPCS | Performed by: INTERNAL MEDICINE

## 2025-06-10 PROCEDURE — 1160F RVW MEDS BY RX/DR IN RCRD: CPT | Performed by: INTERNAL MEDICINE

## 2025-06-10 PROCEDURE — 1123F ACP DISCUSS/DSCN MKR DOCD: CPT | Performed by: INTERNAL MEDICINE

## 2025-06-10 ASSESSMENT — PATIENT HEALTH QUESTIONNAIRE - PHQ9
SUM OF ALL RESPONSES TO PHQ QUESTIONS 1-9: 2
2. FEELING DOWN, DEPRESSED OR HOPELESS: SEVERAL DAYS
SUM OF ALL RESPONSES TO PHQ QUESTIONS 1-9: 2
1. LITTLE INTEREST OR PLEASURE IN DOING THINGS: SEVERAL DAYS
SUM OF ALL RESPONSES TO PHQ QUESTIONS 1-9: 2
SUM OF ALL RESPONSES TO PHQ QUESTIONS 1-9: 2

## 2025-06-10 NOTE — PATIENT INSTRUCTIONS
home?  Keeping your body active can help slow MCI. Exercises like walking can help. Try to stay active mentally too. Read or do things like crossword puzzles if you enjoy doing them.  If you need help coping with MCI, you may want to get support from family, friends, a support group, or a counselor who works with people who have MCI.  Though the future isn't always clear, it can be good to plan ahead with instructions for your care. These are called advanced directives. Having a plan can help make sure that you get the care you want.  Current as of: December 3, 2024  Content Version: 14.5  © 2024-2025 [employer].   Care instructions adapted under license by [employer]. If you have questions about a medical condition or this instruction, always ask your healthcare professional. [employer], disclaims any warranty or liability for your use of this information.         Learning About Being Active as an Older Adult  Why is being active important as you get older?     Being active is one of the best things you can do for your health. And it's never too late to start. Being active--or getting active, if you aren't already--has definite benefits. It can:  Give you more energy,  Keep your mind sharp.  Improve balance to reduce your risk of falls.  Help you manage chronic illness with fewer medicines.  No matter how old you are, how fit you are, or what health problems you have, there is a form of activity that will work for you. And the more physical activity you can do, the better your overall health will be.  What kinds of activity can help you stay healthy?  Being more active will make your daily activities easier. Physical activity includes planned exercise and things you do in daily life. There are four types of activity:  Aerobic.  Doing aerobic activity makes your heart and lungs strong.  Includes walking, dancing, and gardening.  Aim for at least 2½ hours spread throughout the week.  It  intact

## 2025-06-10 NOTE — PROGRESS NOTES
Medicare Annual Wellness Visit    Marley Otero is here for Medicare AWV    Assessment & Plan   Medicare annual wellness visit, subsequent  Hypercholesteremia  -     Uric Acid; Future  -     TSH; Future  -     Lipid Panel; Future  -     Comprehensive Metabolic Panel; Future  -     CBC with Auto Differential; Future  S/P reverse total shoulder arthroplasty, left    Medicare exam done  HLD check labs  Obesity. Weight loss recommended.     No follow-ups on file.     Subjective     She is here for a Medicare exam.     She is not on any medication. Doing well.    Patient's complete Health Risk Assessment and screening values have been reviewed and are found in Flowsheets. The following problems were reviewed today and where indicated follow up appointments were made and/or referrals ordered.    Positive Risk Factor Screenings with Interventions:     Cognitive:   Clock Drawing Test (CDT): Normal  Words recalled: 0 Words Recalled  Total Score: (!) 2  Total Score Interpretation: Abnormal Mini-Cog  Interventions:  She feels her memory is ok.            Inactivity:  On average, how many days per week do you engage in moderate to strenuous exercise (like a brisk walk)?: 0 days (!) Abnormal  On average, how many minutes do you engage in exercise at this level?: 0 min  Interventions:  Patient declined any further interventions or treatment  She is the primary care giver for her      Abnormal BMI (obese):  Body mass index is 30.83 kg/m². (!) Abnormal  Interventions:  low carbohydrate diet                       Objective   Vitals:    06/10/25 1354   BP: 120/80   BP Site: Right Upper Arm   Patient Position: Sitting   BP Cuff Size: Large Adult   Pulse: 70   Resp: 12   Weight: 86.6 kg (191 lb)   Height: 1.676 m (5' 6\")      Body mass index is 30.83 kg/m².      General Appearance: alert and oriented to person, place and time, well developed and well- nourished, in no acute distress  Skin: warm and dry, no rash or

## 2025-06-11 ENCOUNTER — RESULTS FOLLOW-UP (OUTPATIENT)
Dept: INTERNAL MEDICINE CLINIC | Age: 70
End: 2025-06-11

## 2025-06-11 DIAGNOSIS — E78.5 HYPERLIPIDEMIA, UNSPECIFIED HYPERLIPIDEMIA TYPE: Primary | ICD-10-CM

## 2025-06-11 LAB
ALBUMIN SERPL-MCNC: 4.8 G/DL (ref 3.4–5)
ALBUMIN/GLOB SERPL: 1.6 {RATIO} (ref 1.1–2.2)
ALP SERPL-CCNC: 88 U/L (ref 40–129)
ALT SERPL-CCNC: 20 U/L (ref 10–40)
ANION GAP SERPL CALCULATED.3IONS-SCNC: 13 MMOL/L (ref 3–16)
AST SERPL-CCNC: 23 U/L (ref 15–37)
BASOPHILS # BLD: 0.1 K/UL (ref 0–0.2)
BASOPHILS NFR BLD: 0.9 %
BILIRUB SERPL-MCNC: 0.3 MG/DL (ref 0–1)
BUN SERPL-MCNC: 16 MG/DL (ref 7–20)
CALCIUM SERPL-MCNC: 9.9 MG/DL (ref 8.3–10.6)
CHLORIDE SERPL-SCNC: 103 MMOL/L (ref 99–110)
CHOLEST SERPL-MCNC: 268 MG/DL (ref 0–199)
CO2 SERPL-SCNC: 25 MMOL/L (ref 21–32)
CREAT SERPL-MCNC: 1 MG/DL (ref 0.6–1.2)
DEPRECATED RDW RBC AUTO: 13.2 % (ref 12.4–15.4)
EOSINOPHIL # BLD: 0.2 K/UL (ref 0–0.6)
EOSINOPHIL NFR BLD: 2.5 %
GFR SERPLBLD CREATININE-BSD FMLA CKD-EPI: 61 ML/MIN/{1.73_M2}
GLUCOSE SERPL-MCNC: 106 MG/DL (ref 70–99)
HCT VFR BLD AUTO: 41.4 % (ref 36–48)
HDLC SERPL-MCNC: 54 MG/DL (ref 40–60)
HGB BLD-MCNC: 14 G/DL (ref 12–16)
LDLC SERPL CALC-MCNC: 169 MG/DL
LYMPHOCYTES # BLD: 1.9 K/UL (ref 1–5.1)
LYMPHOCYTES NFR BLD: 29.3 %
MCH RBC QN AUTO: 29.1 PG (ref 26–34)
MCHC RBC AUTO-ENTMCNC: 33.9 G/DL (ref 31–36)
MCV RBC AUTO: 85.9 FL (ref 80–100)
MONOCYTES # BLD: 0.5 K/UL (ref 0–1.3)
MONOCYTES NFR BLD: 7.4 %
NEUTROPHILS # BLD: 3.8 K/UL (ref 1.7–7.7)
NEUTROPHILS NFR BLD: 59.9 %
PLATELET # BLD AUTO: 276 K/UL (ref 135–450)
PMV BLD AUTO: 8.2 FL (ref 5–10.5)
POTASSIUM SERPL-SCNC: 4.6 MMOL/L (ref 3.5–5.1)
PROT SERPL-MCNC: 7.8 G/DL (ref 6.4–8.2)
RBC # BLD AUTO: 4.82 M/UL (ref 4–5.2)
SODIUM SERPL-SCNC: 141 MMOL/L (ref 136–145)
TRIGL SERPL-MCNC: 225 MG/DL (ref 0–150)
TSH SERPL DL<=0.005 MIU/L-ACNC: 1.67 UIU/ML (ref 0.27–4.2)
URATE SERPL-MCNC: 5.7 MG/DL (ref 2.6–6)
VLDLC SERPL CALC-MCNC: 45 MG/DL
WBC # BLD AUTO: 6.3 K/UL (ref 4–11)

## 2025-06-11 RX ORDER — ATORVASTATIN CALCIUM 20 MG/1
20 TABLET, FILM COATED ORAL DAILY
Qty: 30 TABLET | Refills: 1 | Status: SHIPPED | OUTPATIENT
Start: 2025-06-11

## 2025-07-31 ENCOUNTER — ANESTHESIA EVENT (OUTPATIENT)
Dept: OPERATING ROOM | Age: 70
End: 2025-07-31
Payer: MEDICARE

## 2025-08-04 ENCOUNTER — OFFICE VISIT (OUTPATIENT)
Dept: INTERNAL MEDICINE CLINIC | Age: 70
End: 2025-08-04

## 2025-08-04 VITALS
BODY MASS INDEX: 30.53 KG/M2 | HEIGHT: 66 IN | SYSTOLIC BLOOD PRESSURE: 120 MMHG | WEIGHT: 190 LBS | RESPIRATION RATE: 12 BRPM | DIASTOLIC BLOOD PRESSURE: 80 MMHG | HEART RATE: 70 BPM

## 2025-08-04 DIAGNOSIS — E78.00 HYPERCHOLESTEREMIA: ICD-10-CM

## 2025-08-04 DIAGNOSIS — Z01.818 PREOP EXAM FOR INTERNAL MEDICINE: Primary | ICD-10-CM

## 2025-08-04 DIAGNOSIS — E66.01 SEVERE OBESITY (BMI 35.0-39.9) WITH COMORBIDITY (HCC): ICD-10-CM

## 2025-08-04 PROCEDURE — 3017F COLORECTAL CA SCREEN DOC REV: CPT | Performed by: INTERNAL MEDICINE

## 2025-08-04 PROCEDURE — 1036F TOBACCO NON-USER: CPT | Performed by: INTERNAL MEDICINE

## 2025-08-04 PROCEDURE — G8399 PT W/DXA RESULTS DOCUMENT: HCPCS | Performed by: INTERNAL MEDICINE

## 2025-08-04 PROCEDURE — 99214 OFFICE O/P EST MOD 30 MIN: CPT | Performed by: INTERNAL MEDICINE

## 2025-08-04 PROCEDURE — G8427 DOCREV CUR MEDS BY ELIG CLIN: HCPCS | Performed by: INTERNAL MEDICINE

## 2025-08-04 PROCEDURE — 1159F MED LIST DOCD IN RCRD: CPT | Performed by: INTERNAL MEDICINE

## 2025-08-04 PROCEDURE — 1090F PRES/ABSN URINE INCON ASSESS: CPT | Performed by: INTERNAL MEDICINE

## 2025-08-04 PROCEDURE — 1160F RVW MEDS BY RX/DR IN RCRD: CPT | Performed by: INTERNAL MEDICINE

## 2025-08-04 PROCEDURE — 1123F ACP DISCUSS/DSCN MKR DOCD: CPT | Performed by: INTERNAL MEDICINE

## 2025-08-04 PROCEDURE — G8417 CALC BMI ABV UP PARAM F/U: HCPCS | Performed by: INTERNAL MEDICINE

## 2025-08-04 RX ORDER — ATORVASTATIN CALCIUM 20 MG/1
20 TABLET, FILM COATED ORAL DAILY
Qty: 30 TABLET | Refills: 1 | Status: SHIPPED | OUTPATIENT
Start: 2025-08-04

## 2025-08-12 ENCOUNTER — HOSPITAL ENCOUNTER (OUTPATIENT)
Age: 70
Setting detail: OUTPATIENT SURGERY
Discharge: HOME | End: 2025-08-12
Attending: UROLOGY | Admitting: UROLOGY
Payer: MEDICARE

## 2025-08-12 ENCOUNTER — ANESTHESIA (OUTPATIENT)
Dept: OPERATING ROOM | Age: 70
End: 2025-08-12
Payer: MEDICARE

## 2025-08-12 VITALS
WEIGHT: 190 LBS | SYSTOLIC BLOOD PRESSURE: 101 MMHG | DIASTOLIC BLOOD PRESSURE: 62 MMHG | BODY MASS INDEX: 30.53 KG/M2 | OXYGEN SATURATION: 95 % | HEIGHT: 66 IN | HEART RATE: 64 BPM | TEMPERATURE: 98 F | RESPIRATION RATE: 16 BRPM

## 2025-08-12 PROCEDURE — 2709999900 HC NON-CHARGEABLE SUPPLY: Performed by: UROLOGY

## 2025-08-12 PROCEDURE — 3700000000 HC ANESTHESIA ATTENDED CARE: Performed by: UROLOGY

## 2025-08-12 PROCEDURE — 2580000003 HC RX 258: Performed by: NURSE ANESTHETIST, CERTIFIED REGISTERED

## 2025-08-12 PROCEDURE — 3600000004 HC SURGERY LEVEL 4 BASE: Performed by: UROLOGY

## 2025-08-12 PROCEDURE — 6360000002 HC RX W HCPCS: Performed by: NURSE ANESTHETIST, CERTIFIED REGISTERED

## 2025-08-12 PROCEDURE — 6370000000 HC RX 637 (ALT 250 FOR IP): Performed by: UROLOGY

## 2025-08-12 PROCEDURE — 3600000014 HC SURGERY LEVEL 4 ADDTL 15MIN: Performed by: UROLOGY

## 2025-08-12 PROCEDURE — 3700000001 HC ADD 15 MINUTES (ANESTHESIA): Performed by: UROLOGY

## 2025-08-12 PROCEDURE — 2500000003 HC RX 250 WO HCPCS: Performed by: ANESTHESIOLOGY

## 2025-08-12 PROCEDURE — 7100000011 HC PHASE II RECOVERY - ADDTL 15 MIN: Performed by: UROLOGY

## 2025-08-12 PROCEDURE — 6360000002 HC RX W HCPCS: Performed by: ANESTHESIOLOGY

## 2025-08-12 PROCEDURE — 2500000003 HC RX 250 WO HCPCS: Performed by: UROLOGY

## 2025-08-12 PROCEDURE — 2580000003 HC RX 258: Performed by: ANESTHESIOLOGY

## 2025-08-12 PROCEDURE — 7100000010 HC PHASE II RECOVERY - FIRST 15 MIN: Performed by: UROLOGY

## 2025-08-12 RX ORDER — PHENAZOPYRIDINE HYDROCHLORIDE 200 MG/1
200 TABLET, FILM COATED ORAL 3 TIMES DAILY PRN
Qty: 15 TABLET | Refills: 0 | Status: SHIPPED | OUTPATIENT
Start: 2025-08-12 | End: 2025-08-17

## 2025-08-12 RX ORDER — SODIUM CHLORIDE 0.9 % (FLUSH) 0.9 %
5-40 SYRINGE (ML) INJECTION PRN
Status: CANCELLED | OUTPATIENT
Start: 2025-08-12

## 2025-08-12 RX ORDER — OXYCODONE HYDROCHLORIDE 5 MG/1
5 TABLET ORAL PRN
Refills: 0 | Status: CANCELLED | OUTPATIENT
Start: 2025-08-12 | End: 2025-08-12

## 2025-08-12 RX ORDER — LIDOCAINE HYDROCHLORIDE 20 MG/ML
JELLY TOPICAL PRN
Status: DISCONTINUED | OUTPATIENT
Start: 2025-08-12 | End: 2025-08-12 | Stop reason: ALTCHOICE

## 2025-08-12 RX ORDER — GENTAMICIN 40 MG/ML
INJECTION, SOLUTION INTRAMUSCULAR; INTRAVENOUS
Status: DISCONTINUED | OUTPATIENT
Start: 2025-08-12 | End: 2025-08-12 | Stop reason: SDUPTHER

## 2025-08-12 RX ORDER — ONDANSETRON 2 MG/ML
4 INJECTION INTRAMUSCULAR; INTRAVENOUS
Status: CANCELLED | OUTPATIENT
Start: 2025-08-12

## 2025-08-12 RX ORDER — PROPOFOL 10 MG/ML
INJECTION, EMULSION INTRAVENOUS
Status: DISCONTINUED | OUTPATIENT
Start: 2025-08-12 | End: 2025-08-12 | Stop reason: SDUPTHER

## 2025-08-12 RX ORDER — LIDOCAINE HYDROCHLORIDE 20 MG/ML
INJECTION, SOLUTION INFILTRATION; PERINEURAL
Status: DISCONTINUED | OUTPATIENT
Start: 2025-08-12 | End: 2025-08-12 | Stop reason: SDUPTHER

## 2025-08-12 RX ORDER — OXYCODONE HYDROCHLORIDE 5 MG/1
10 TABLET ORAL PRN
Refills: 0 | Status: CANCELLED | OUTPATIENT
Start: 2025-08-12 | End: 2025-08-12

## 2025-08-12 RX ORDER — SODIUM CHLORIDE, SODIUM LACTATE, POTASSIUM CHLORIDE, CALCIUM CHLORIDE 600; 310; 30; 20 MG/100ML; MG/100ML; MG/100ML; MG/100ML
INJECTION, SOLUTION INTRAVENOUS
Status: DISCONTINUED | OUTPATIENT
Start: 2025-08-12 | End: 2025-08-12 | Stop reason: SDUPTHER

## 2025-08-12 RX ORDER — SODIUM CHLORIDE 0.9 % (FLUSH) 0.9 %
5-40 SYRINGE (ML) INJECTION EVERY 12 HOURS SCHEDULED
Status: CANCELLED | OUTPATIENT
Start: 2025-08-12

## 2025-08-12 RX ORDER — SODIUM CHLORIDE, SODIUM LACTATE, POTASSIUM CHLORIDE, CALCIUM CHLORIDE 600; 310; 30; 20 MG/100ML; MG/100ML; MG/100ML; MG/100ML
INJECTION, SOLUTION INTRAVENOUS CONTINUOUS
Status: CANCELLED | OUTPATIENT
Start: 2025-08-12

## 2025-08-12 RX ORDER — SODIUM CHLORIDE 9 MG/ML
INJECTION, SOLUTION INTRAVENOUS PRN
Status: CANCELLED | OUTPATIENT
Start: 2025-08-12

## 2025-08-12 RX ORDER — SODIUM CHLORIDE 0.9 % (FLUSH) 0.9 %
5-40 SYRINGE (ML) INJECTION EVERY 12 HOURS SCHEDULED
Status: DISCONTINUED | OUTPATIENT
Start: 2025-08-12 | End: 2025-08-12 | Stop reason: HOSPADM

## 2025-08-12 RX ORDER — SODIUM CHLORIDE, SODIUM LACTATE, POTASSIUM CHLORIDE, CALCIUM CHLORIDE 600; 310; 30; 20 MG/100ML; MG/100ML; MG/100ML; MG/100ML
INJECTION, SOLUTION INTRAVENOUS CONTINUOUS
Status: DISCONTINUED | OUTPATIENT
Start: 2025-08-12 | End: 2025-08-12 | Stop reason: HOSPADM

## 2025-08-12 RX ORDER — SODIUM CHLORIDE 0.9 % (FLUSH) 0.9 %
5-40 SYRINGE (ML) INJECTION PRN
Status: DISCONTINUED | OUTPATIENT
Start: 2025-08-12 | End: 2025-08-12 | Stop reason: HOSPADM

## 2025-08-12 RX ORDER — SODIUM CHLORIDE 9 MG/ML
INJECTION, SOLUTION INTRAVENOUS PRN
Status: DISCONTINUED | OUTPATIENT
Start: 2025-08-12 | End: 2025-08-12 | Stop reason: HOSPADM

## 2025-08-12 RX ORDER — SULFAMETHOXAZOLE AND TRIMETHOPRIM 400; 80 MG/1; MG/1
1 TABLET ORAL 2 TIMES DAILY
Qty: 8 TABLET | Refills: 0 | Status: SHIPPED | OUTPATIENT
Start: 2025-08-12 | End: 2025-08-16

## 2025-08-12 RX ORDER — MEPERIDINE HYDROCHLORIDE 25 MG/ML
12.5 INJECTION INTRAMUSCULAR; INTRAVENOUS; SUBCUTANEOUS EVERY 5 MIN PRN
Refills: 0 | Status: CANCELLED | OUTPATIENT
Start: 2025-08-12

## 2025-08-12 RX ADMIN — SODIUM CHLORIDE, SODIUM LACTATE, POTASSIUM CHLORIDE, AND CALCIUM CHLORIDE: .6; .31; .03; .02 INJECTION, SOLUTION INTRAVENOUS at 06:35

## 2025-08-12 RX ADMIN — PROPOFOL 50 MG: 10 INJECTION, EMULSION INTRAVENOUS at 07:40

## 2025-08-12 RX ADMIN — FAMOTIDINE 20 MG: 10 INJECTION, SOLUTION INTRAVENOUS at 06:34

## 2025-08-12 RX ADMIN — SODIUM CHLORIDE, POTASSIUM CHLORIDE, SODIUM LACTATE AND CALCIUM CHLORIDE: 600; 310; 30; 20 INJECTION, SOLUTION INTRAVENOUS at 07:30

## 2025-08-12 RX ADMIN — PROPOFOL 200 MG: 10 INJECTION, EMULSION INTRAVENOUS at 07:34

## 2025-08-12 RX ADMIN — LIDOCAINE HYDROCHLORIDE 60 MG: 20 INJECTION, SOLUTION INFILTRATION; PERINEURAL at 07:34

## 2025-08-12 RX ADMIN — GENTAMICIN SULFATE 240 MG: 40 INJECTION, SOLUTION INTRAMUSCULAR; INTRAVENOUS at 07:27

## 2025-08-12 ASSESSMENT — PAIN - FUNCTIONAL ASSESSMENT
PAIN_FUNCTIONAL_ASSESSMENT: 0-10

## 2025-08-12 ASSESSMENT — ENCOUNTER SYMPTOMS: SHORTNESS OF BREATH: 0

## 2025-08-12 ASSESSMENT — LIFESTYLE VARIABLES: SMOKING_STATUS: 0

## (undated) DEVICE — GLOVE ORANGE PI 8   MSG9080

## (undated) DEVICE — CANNULA NSL 13FT TUBE AD ETCO2 DIV SAMP M

## (undated) DEVICE — PROTECTOR EYE PT SELF ADH NS OPT GRD LF

## (undated) DEVICE — BOWL MED L 32OZ PLAS W/ MOLD GRAD EZ OPN PEEL PCH

## (undated) DEVICE — GLOVE ORTHO 7 1/2   MSG9475

## (undated) DEVICE — DRILL BIT 2.4MM (3/32'') X 128.0MM

## (undated) DEVICE — TUBING, SUCTION, 3/16" X 12', STRAIGHT: Brand: MEDLINE

## (undated) DEVICE — SYRINGE MED 10ML LUERLOCK TIP W/O SFTY DISP

## (undated) DEVICE — CODMAN® SURGICAL PATTIES 1/2" X 1/2" (1.27CM X 1.27CM): Brand: CODMAN®

## (undated) DEVICE — PENCIL SMK EVAC ALL IN 1 DSGN ENH VISIBILITY IMPROVED AIR

## (undated) DEVICE — SUTURE VCRL + SZ 3-0 L18IN ABSRB UD SH 1/2 CIR TAPERCUT NDL VCP864D

## (undated) DEVICE — GOWN,SIRUS,POLYRNF,BRTHSLV,XL,30/CS: Brand: MEDLINE

## (undated) DEVICE — STOCKINETTE,IMPERVIOUS,12X48,STERILE: Brand: MEDLINE

## (undated) DEVICE — Device

## (undated) DEVICE — SOLUTION IRRIG 2000ML 0.9% SOD CHL USP UROMATIC PLAS CONT

## (undated) DEVICE — SUTURE MCRYL SZ 3-0 L27IN ABSRB UD PS-2 3/8 CIR REV CUT NDL MCP427H

## (undated) DEVICE — INVIEW CLEAR LEGGINGS: Brand: CONVERTORS

## (undated) DEVICE — HUMELOCK CEMENTED SHOULDER PROSTHESIS, CENTERED HEAD CRCO 43X16: Brand: HUMELOCK CEMENTED SHOULDER PROSTHESIS

## (undated) DEVICE — SOLUTION IRRIGATION STRL H2O 1000 ML UROMATIC CONTAINER

## (undated) DEVICE — TOWEL,OR,DSP,ST,BLUE,STD,8/PK,10PK/CS: Brand: MEDLINE

## (undated) DEVICE — BASIC CYSTO I-LF: Brand: MEDLINE INDUSTRIES, INC.

## (undated) DEVICE — PREP SOL PVP IODINE 4%  4 OZ/BTL

## (undated) DEVICE — SUTURE ETHBND EXCEL SZ 5 L30IN NONABSORBABLE GRN L40MM V-37 MB66G

## (undated) DEVICE — PENCIL ES CRD L10FT HND SWCHING ROCK SWCH W/ EDGE COAT BLDE

## (undated) DEVICE — INTENDED FOR TISSUE SEPARATION, AND OTHER PROCEDURES THAT REQUIRE A SHARP SURGICAL BLADE TO PUNCTURE OR CUT.: Brand: BARD-PARKER ® STAINLESS STEEL BLADES

## (undated) DEVICE — 3M™ COBAN™ NL STERILE NON-LATEX SELF-ADHERENT WRAP, 2084S, 4 IN X 5 YD (10 CM X 4,5 M), 18 ROLLS/CASE: Brand: 3M™ COBAN™

## (undated) DEVICE — BAG URIN STRL FOR URO CTCH SYS

## (undated) DEVICE — GLOVE ORANGE PI 7 1/2   MSG9075

## (undated) DEVICE — CIRCUIT ANES L72IN 3L BACT AND VIR FLTR EL CONN SGL LIMB

## (undated) DEVICE — CORD ES L12FT BPLR FRCP

## (undated) DEVICE — SUTURE ETHBND EXCEL SZ 2 L30IN NONABSORBABLE GRN L40MM V-37 MX69G

## (undated) DEVICE — TUBING, SUCTION, 3/16" X 10', STRAIGHT: Brand: MEDLINE

## (undated) DEVICE — GLOVE SURG SZ 8 L12IN FNGR THK79MIL GRN LTX FREE

## (undated) DEVICE — DRAPE EQUIP C ARM MINI 10000100] TIDI PRODUCTS INC]

## (undated) DEVICE — SPONGE LAP W18XL18IN WHT COT 4 PLY FLD STRUNG RADPQ DISP ST 2 PER PACK

## (undated) DEVICE — SUTURE PERMAHAND SZ 2-0 L30IN 10X30IN TIE NONABSORBABLE BLK SA85H

## (undated) DEVICE — HOOD, PEEL-AWAY: Brand: FLYTE

## (undated) DEVICE — OPEN-END FLEXI-TIP URETERAL CATHETER: Brand: FLEXI-TIP

## (undated) DEVICE — SOLUTION IV IRRIG 500ML 0.9% SODIUM CHL 2F7123

## (undated) DEVICE — KIT SHLDR STBL MARCO FOR SPIDER LIMB POS

## (undated) DEVICE — BANDAGE COBAN 4 IN COMPR W4INXL5YD FOAM COHESIVE QUIK STK SELF ADH SFT

## (undated) DEVICE — BLOOD TRANSFUSION FILTER: Brand: HAEMONETICS

## (undated) DEVICE — PERFUSION SET 120 MM

## (undated) DEVICE — SUTURE ETHLN SZ 3-0 L18IN NONABSORBABLE BLK L24MM PS-1 3/8 1663G

## (undated) DEVICE — GUIDEWIRE VASC NITINOL HYDROPHIL STR 3 CM 0.035 INX150 CM STD NIT ZIPWIRE

## (undated) DEVICE — HANDPIECE SET WITH HIGH FLOW TIP AND SUCTION TUBE: Brand: INTERPULSE

## (undated) DEVICE — SLING ARM L ABV 13IN DE-ROTATION STRP HOOKS PROVIDE IMMOB

## (undated) DEVICE — AUTOTRANSFUSION BOWL SET 125 CC XTRA

## (undated) DEVICE — NEEDLE SUT 1/2 CIR TAPR TIP TNSL STRL SZ 1 DAVIS

## (undated) DEVICE — SUTURE N ABSRB BRAIDED 5-0 CTX 39 IN 48 MM WHT BLK XBRAID S 3910900052

## (undated) DEVICE — ELECTRODE PT RET AD L9FT HI MOIST COND ADH HYDRGEL CORDED

## (undated) DEVICE — SUTURE VCRL + SZ 2-0 L18IN ABSRB UD CT1 L36MM 1/2 CIR VCP839D

## (undated) DEVICE — GOWN SIRUS NONREIN LG W/TWL: Brand: MEDLINE INDUSTRIES, INC.

## (undated) DEVICE — SUTURE VCRL SZ 0 L36IN ABSRB UD CT-1 L36MM 1/2 CIR TAPR PNT VCP946H